# Patient Record
Sex: FEMALE | Race: WHITE | NOT HISPANIC OR LATINO | URBAN - METROPOLITAN AREA
[De-identification: names, ages, dates, MRNs, and addresses within clinical notes are randomized per-mention and may not be internally consistent; named-entity substitution may affect disease eponyms.]

---

## 2019-02-08 ENCOUNTER — EMERGENCY (EMERGENCY)
Facility: HOSPITAL | Age: 85
LOS: 1 days | End: 2019-02-08
Attending: EMERGENCY MEDICINE
Payer: MEDICARE

## 2019-02-08 VITALS
TEMPERATURE: 98 F | HEIGHT: 66 IN | RESPIRATION RATE: 18 BRPM | SYSTOLIC BLOOD PRESSURE: 159 MMHG | DIASTOLIC BLOOD PRESSURE: 75 MMHG | WEIGHT: 136.03 LBS | HEART RATE: 65 BPM | OXYGEN SATURATION: 94 %

## 2019-02-08 DIAGNOSIS — S31.41XA LACERATION WITHOUT FOREIGN BODY OF VAGINA AND VULVA, INITIAL ENCOUNTER: ICD-10-CM

## 2019-02-08 LAB
ANION GAP SERPL CALC-SCNC: 15 MMOL/L — SIGNIFICANT CHANGE UP (ref 5–17)
APTT BLD: 31.8 SEC — SIGNIFICANT CHANGE UP (ref 27.5–36.3)
BASOPHILS # BLD AUTO: 0 K/UL — SIGNIFICANT CHANGE UP (ref 0–0.2)
BASOPHILS NFR BLD AUTO: 0.2 % — SIGNIFICANT CHANGE UP (ref 0–2)
BLD GP AB SCN SERPL QL: NEGATIVE — SIGNIFICANT CHANGE UP
BUN SERPL-MCNC: 12 MG/DL — SIGNIFICANT CHANGE UP (ref 7–23)
CALCIUM SERPL-MCNC: 9.2 MG/DL — SIGNIFICANT CHANGE UP (ref 8.4–10.5)
CHLORIDE SERPL-SCNC: 103 MMOL/L — SIGNIFICANT CHANGE UP (ref 96–108)
CO2 SERPL-SCNC: 21 MMOL/L — LOW (ref 22–31)
CREAT SERPL-MCNC: 0.6 MG/DL — SIGNIFICANT CHANGE UP (ref 0.5–1.3)
EOSINOPHIL # BLD AUTO: 0.2 K/UL — SIGNIFICANT CHANGE UP (ref 0–0.5)
EOSINOPHIL NFR BLD AUTO: 1.8 % — SIGNIFICANT CHANGE UP (ref 0–6)
GLUCOSE SERPL-MCNC: 87 MG/DL — SIGNIFICANT CHANGE UP (ref 70–99)
HCT VFR BLD CALC: 35.5 % — SIGNIFICANT CHANGE UP (ref 34.5–45)
HCT VFR BLD CALC: 39 % — SIGNIFICANT CHANGE UP (ref 34.5–45)
HGB BLD-MCNC: 12.5 G/DL — SIGNIFICANT CHANGE UP (ref 11.5–15.5)
HGB BLD-MCNC: 13.7 G/DL — SIGNIFICANT CHANGE UP (ref 11.5–15.5)
INR BLD: 1.05 RATIO — SIGNIFICANT CHANGE UP (ref 0.88–1.16)
LYMPHOCYTES # BLD AUTO: 1.2 K/UL — SIGNIFICANT CHANGE UP (ref 1–3.3)
LYMPHOCYTES # BLD AUTO: 11.5 % — LOW (ref 13–44)
MCHC RBC-ENTMCNC: 33.7 PG — SIGNIFICANT CHANGE UP (ref 27–34)
MCHC RBC-ENTMCNC: 33.8 PG — SIGNIFICANT CHANGE UP (ref 27–34)
MCHC RBC-ENTMCNC: 35.3 GM/DL — SIGNIFICANT CHANGE UP (ref 32–36)
MCHC RBC-ENTMCNC: 35.3 GM/DL — SIGNIFICANT CHANGE UP (ref 32–36)
MCV RBC AUTO: 95.5 FL — SIGNIFICANT CHANGE UP (ref 80–100)
MCV RBC AUTO: 95.8 FL — SIGNIFICANT CHANGE UP (ref 80–100)
MONOCYTES # BLD AUTO: 0.8 K/UL — SIGNIFICANT CHANGE UP (ref 0–0.9)
MONOCYTES NFR BLD AUTO: 7.7 % — SIGNIFICANT CHANGE UP (ref 2–14)
NEUTROPHILS # BLD AUTO: 8.3 K/UL — HIGH (ref 1.8–7.4)
NEUTROPHILS NFR BLD AUTO: 78.8 % — HIGH (ref 43–77)
PLATELET # BLD AUTO: 234 K/UL — SIGNIFICANT CHANGE UP (ref 150–400)
PLATELET # BLD AUTO: 272 K/UL — SIGNIFICANT CHANGE UP (ref 150–400)
POTASSIUM SERPL-MCNC: 4.2 MMOL/L — SIGNIFICANT CHANGE UP (ref 3.5–5.3)
POTASSIUM SERPL-SCNC: 4.2 MMOL/L — SIGNIFICANT CHANGE UP (ref 3.5–5.3)
PROTHROM AB SERPL-ACNC: 12.1 SEC — SIGNIFICANT CHANGE UP (ref 10–12.9)
RBC # BLD: 3.71 M/UL — LOW (ref 3.8–5.2)
RBC # BLD: 4.08 M/UL — SIGNIFICANT CHANGE UP (ref 3.8–5.2)
RBC # FLD: 12 % — SIGNIFICANT CHANGE UP (ref 10.3–14.5)
RBC # FLD: 12.1 % — SIGNIFICANT CHANGE UP (ref 10.3–14.5)
RH IG SCN BLD-IMP: POSITIVE — SIGNIFICANT CHANGE UP
RH IG SCN BLD-IMP: POSITIVE — SIGNIFICANT CHANGE UP
SODIUM SERPL-SCNC: 139 MMOL/L — SIGNIFICANT CHANGE UP (ref 135–145)
WBC # BLD: 10.5 K/UL — SIGNIFICANT CHANGE UP (ref 3.8–10.5)
WBC # BLD: 9.5 K/UL — SIGNIFICANT CHANGE UP (ref 3.8–10.5)
WBC # FLD AUTO: 10.5 K/UL — SIGNIFICANT CHANGE UP (ref 3.8–10.5)
WBC # FLD AUTO: 9.5 K/UL — SIGNIFICANT CHANGE UP (ref 3.8–10.5)

## 2019-02-08 PROCEDURE — 99218: CPT

## 2019-02-08 PROCEDURE — 93010 ELECTROCARDIOGRAM REPORT: CPT

## 2019-02-08 RX ORDER — SODIUM CHLORIDE 9 MG/ML
1000 INJECTION INTRAMUSCULAR; INTRAVENOUS; SUBCUTANEOUS
Qty: 0 | Refills: 0 | Status: DISCONTINUED | OUTPATIENT
Start: 2019-02-08 | End: 2019-02-12

## 2019-02-08 RX ORDER — ACETAMINOPHEN 500 MG
1000 TABLET ORAL ONCE
Qty: 0 | Refills: 0 | Status: COMPLETED | OUTPATIENT
Start: 2019-02-08 | End: 2019-02-08

## 2019-02-08 RX ADMIN — SODIUM CHLORIDE 125 MILLILITER(S): 9 INJECTION INTRAMUSCULAR; INTRAVENOUS; SUBCUTANEOUS at 18:37

## 2019-02-08 RX ADMIN — Medication 400 MILLIGRAM(S): at 18:37

## 2019-02-08 RX ADMIN — Medication 1000 MILLIGRAM(S): at 18:38

## 2019-02-08 NOTE — ED ADULT NURSE NOTE - OBJECTIVE STATEMENT
87y f pt was brought in by ob/gyn for vag bleed s/p transvaginal ultrasound; pt states felt a small amt of blood then stood up and felt a "woosh"; pt has gone through 4 pads in last hour; md packed and brought her to ed; pt denies pain but states reason for exam is pt complaint of "burning feeling" to genital area for months and than burning started radiating; aox3; no resp distress; no chest pain; no abd pain; no n/v/d; no fever/chills; packing not removed will wait for ob to unpack and assess; iv placed; labs drawn per md order; safety/comfort maintained

## 2019-02-08 NOTE — ED CLERICAL - NS ED CLERK NOTE PRE-ARRIVAL INFORMATION; ADDITIONAL PRE-ARRIVAL INFORMATION
CC/Reason For referral: Vaginal bleed, needs packing  Preferred Consultant(if applicable): gyn resident  Who admits for you (if needed):na  Do you have documents you would like to fax over?no  Would you still like to speak to an ED attending? yes

## 2019-02-08 NOTE — ED PROVIDER NOTE - OBJECTIVE STATEMENT
87yof pmhx of Afib on Asa, hx of RA sent in by GYN for profuse vaginal bleeding. pt reports getting outpt w/u for possible vaginal infection and today had transvaginal US and after the exam had small amount of blood but when stood up started bleeding heavy and soaked through 4 pads within hour. pt was then packed by Dr. Alvarez in office and sent to the ER. Pt denies any dizziness, syncope, headache, palpitations. No abd pain.

## 2019-02-08 NOTE — ED ADULT NURSE NOTE - NSIMPLEMENTINTERV_GEN_ALL_ED
Implemented All Fall with Harm Risk Interventions:  [place] to call system. Call bell, personal items and telephone within reach. Instruct patient to call for assistance. Room bathroom lighting operational. Non-slip footwear when patient is off stretcher. Physically safe environment: no spills, clutter or unnecessary equipment. Stretcher in lowest position, wheels locked, appropriate side rails in place. Provide visual cue, wrist band, yellow gown, etc. Monitor gait and stability. Monitor for mental status changes and reorient to person, place, and time. Review medications for side effects contributing to fall risk. Reinforce activity limits and safety measures with patient and family. Provide visual clues: red socks.

## 2019-02-08 NOTE — CONSULT NOTE ADULT - ASSESSMENT
88 yo with vaginal laceration 2/2 transvaginal ultrasound.  Patient continues to have active bleeding but is a poor candidate for surgical repair due to vaginal atrophy and stenosis.  Patient is on daily baby aspirin which may be contributing to bleeding.  She remains hemodynamically stable, with normal H/H and without signs or symptoms of anemia.  She is experiencing minimal pain at this time.

## 2019-02-08 NOTE — ED PROVIDER NOTE - ATTENDING CONTRIBUTION TO CARE
86 yo F presents with vaginal bleeding since ~12pm after transvaginal ultrasound done as outpatient by obgyn Dr. velázquez. patient had US done for burning pelvic sensation. during the procedure patient started having heavy vaginal bleeding. has gone through several pads already in a few hours. sent in by obgyn for further management. patient has not other complaints at this time.   patient on baby aspirin, took last dose this morning  no f/ch, uri symptoms, cough, cp, sob, N/V/D, urinary complaints, abd pain  PE lungs CTA. abd soft and NT. pelvic exam deffered to obgyn team 88 yo F presents with vaginal bleeding since ~12pm after transvaginal ultrasound done as outpatient by obgyn Dr. velázquez. patient had US done for burning pelvic sensation. during the procedure patient started having heavy vaginal bleeding. has gone through several pads already in a few hours. sent in by obgyn for further management. patient has not other complaints at this time.   patient on baby aspirin, took last dose this morning  no f/ch, uri symptoms, cough, cp, sob, N/V/D, urinary complaints, abd pain  PE lungs CTA. abd soft and NT. pelvic exam deffered to obgyn team  obgyn was consulted. they came to eval  patient. they packed vagina. patietn was observed in the ER for several hours. repeat cbc obtained, HB decreased by one unit. bleeding signifiantly slowed down.   obgyn recommended further obs. patient was transferred to CDU for continued observation, VS montiring, serial H/H.

## 2019-02-08 NOTE — CONSULT NOTE ADULT - SUBJECTIVE AND OBJECTIVE BOX
R3 GYN Consult note    86yo G0 postmenopausal woman presents with vaginal bleeding after a transvaginal ultrasound performed in GYN office today.  Patient presented this morning complaining of labial burning, which had occurred once before.  Dr Gunderson performed a transvaginal ultrasound as part of the work-up and patient began experiencing bright red bleeding.  A laceration could not be identified so the vaginal was packed with two gauzes and patient was transferred to ER.  Patient continues to soak menstrual pads through the packing.  She denies lightheadedness, chest pain, nausea, and vomiting.    OB/GYN HISTORY: G0, history of fibroids  PAST MEDICAL & SURGICAL HISTORY: A-fib on baby aspirin, Rheumatoid arthritis, s/p thoracoscopy (benign lung nodules - MAC)    Allergies Cardizem (Rash)    MEDICATIONS  (STANDING): Vera[cinthya 120 mg BID, Atenolol 25 mg, Xeljanz XR 11 mg, Omeprazole, simvastatin 10 mg, ASA81, flovent inhaler, zyrtec, biotin 300 mcg, Caltrat D3, MVI, Align Probiotic, Omega, zantac, Folic acid 4 mg    SOCIAL HISTORY: former smoker, denies alcohol and illicit drug use     Vital Signs Last 24 Hrs  T(C): 36.6 (08 Feb 2019 14:54), Max: 36.6 (08 Feb 2019 14:54)  T(F): 97.8 (08 Feb 2019 14:54), Max: 97.8 (08 Feb 2019 14:54)  HR: 65 (08 Feb 2019 14:54) (65 - 65)  BP: 159/75 (08 Feb 2019 14:54) (159/75 - 159/75)  BP(mean): --  RR: 18 (08 Feb 2019 14:54) (18 - 18)  SpO2: 94% (08 Feb 2019 14:54) (94% - 94%)    PHYSICAL EXAM:      Constitutional: alert and oriented x 3    Breasts: no tenderness, no nodules    Respiratory: clear    Cardiovascular: regular rate and rhythm    Gastrointestinal: soft, non tender, + bowel sounds. No hepatosplenomegaly, no palpable masses    Genitourinary: NEFG  Cervix: closed/ long, no CMT  Uterus: normal size, non tender  Adnexa: non tender, no palpable masses    Rectal:     Extremities:    Neurological:    Skin:    Lymph Nodes:        LABS:                        13.7   10.5  )-----------( 272      ( 08 Feb 2019 17:01 )             39.0                     RADIOLOGY & ADDITIONAL STUDIES: R3 GYN Consult note    88yo G0 postmenopausal woman presents with vaginal bleeding after a transvaginal ultrasound performed in GYN office today.  Patient presented this morning complaining of labial burning, which had occurred once before.  Dr Gunderson performed a transvaginal ultrasound as part of the work-up and patient began experiencing bright red bleeding.  A laceration could not be identified so the vaginal was packed with two gauzes and patient was transferred to ER.  Patient continues to soak menstrual pads through the packing.  She denies lightheadedness, chest pain, nausea, and vomiting.    OB/GYN HISTORY: G0, history of fibroids  PAST MEDICAL & SURGICAL HISTORY: A-fib on baby aspirin, Rheumatoid arthritis, s/p thoracoscopy (benign lung nodules - MAC)    Allergies Cardizem (Rash)    MEDICATIONS  (STANDING): Vera[cinthya 120 mg BID, Atenolol 25 mg, Xeljanz XR 11 mg, Omeprazole, simvastatin 10 mg, ASA81, flovent inhaler, zyrtec, biotin 300 mcg, Caltrat D3, MVI, Align Probiotic, Omega, zantac, Folic acid 4 mg    SOCIAL HISTORY: former smoker, denies alcohol and illicit drug use     Vital Signs Last 24 Hrs  T(C): 36.6 (08 Feb 2019 14:54), Max: 36.6 (08 Feb 2019 14:54)  T(F): 97.8 (08 Feb 2019 14:54), Max: 97.8 (08 Feb 2019 14:54)  HR: 65 (08 Feb 2019 14:54) (65 - 65)  BP: 159/75 (08 Feb 2019 14:54) (159/75 - 159/75)  BP(mean): --  RR: 18 (08 Feb 2019 14:54) (18 - 18)  SpO2: 94% (08 Feb 2019 14:54) (94% - 94%)    PHYSICAL EXAM:      Constitutional: alert and oriented x 3    Respiratory: clear    Cardiovascular: regular rate and rhythm    Gastrointestinal: soft, non tender, + bowel sounds. No hepatosplenomegaly, no palpable masses    Genitourinary: bilateral vulvar erythema without discrete lesions  Vaginal packing removed, noted to be soaked.  Bright red bleeding noted.  Extreme vaginal atrophy and stenosis with tight ring of tissue posterior to hymenal ring.  Vaginal packed with krilex soaked in Monsel's solution        LABS:                        13.7   10.5  )-----------( 272      ( 08 Feb 2019 17:01 )             39.0

## 2019-02-08 NOTE — CHART NOTE - NSCHARTNOTEFT_GEN_A_CORE
R4  Patient seen and evaluated. Denies CP/SOB/lightheadedness.   ICU Vital Signs Last 24 Hrs  T(C): 36.4 (08 Feb 2019 20:32), Max: 36.6 (08 Feb 2019 14:54)  T(F): 97.6 (08 Feb 2019 20:32), Max: 97.8 (08 Feb 2019 14:54)  HR: 66 (08 Feb 2019 20:32) (65 - 66)  BP: 131/65 (08 Feb 2019 20:32) (131/65 - 159/75)  BP(mean): --  ABP: --  ABP(mean): --  RR: 16 (08 Feb 2019 20:32) (16 - 18)  SpO2: 94% (08 Feb 2019 20:32) (94% - 95%)  ve: no active bleeding, scant VB (<50 cc) of dark red blood on pad    plan:  will admit to cdu overnight for monitoring to r/o further VB.  TSS R4  d/w dr balbuena, [place] with [place] thomas ([facility] [place] R4  Patient seen and evaluated. Denies CP/SOB/lightheadedness.   ICU Vital Signs Last 24 Hrs  T(C): 36.4 (08 Feb 2019 20:32), Max: 36.6 (08 Feb 2019 14:54)  T(F): 97.6 (08 Feb 2019 20:32), Max: 97.8 (08 Feb 2019 14:54)  HR: 66 (08 Feb 2019 20:32) (65 - 66)  BP: 131/65 (08 Feb 2019 20:32) (131/65 - 159/75)  BP(mean): --  ABP: --  ABP(mean): --  RR: 16 (08 Feb 2019 20:32) (16 - 18)  SpO2: 94% (08 Feb 2019 20:32) (94% - 95%)  ve: no active bleeding, scant VB (<50 cc) of dark red blood on pad    plan:  -will admit to cdu overnight for monitoring to r/o further VB.  -vital signs q2  -cbc at 5 am  TSS R4  d/w dr balbuena, seen with dr thomas ([facility] [place])

## 2019-02-08 NOTE — ED PROVIDER NOTE - PROGRESS NOTE DETAILS
spoke to GYN resident. made aware that packing was not removed and no fresh blood on the underwear. will come see the pt.

## 2019-02-08 NOTE — CONSULT NOTE ADULT - PROBLEM SELECTOR RECOMMENDATION 9
-Monitor bleeding with packing in place  -Monitor vital signs and for signs or symptoms of anemia  -Keep NPO on maintenance fluids in case surgical intervention is needed  -Analgesia as needed    dw Dr Gunderson, Patient seen and examined with Dr Johnson PGY4    MACK Garcia PGY3

## 2019-02-09 VITALS
TEMPERATURE: 98 F | OXYGEN SATURATION: 98 % | SYSTOLIC BLOOD PRESSURE: 147 MMHG | HEART RATE: 70 BPM | RESPIRATION RATE: 18 BRPM | DIASTOLIC BLOOD PRESSURE: 69 MMHG

## 2019-02-09 DIAGNOSIS — N93.9 ABNORMAL UTERINE AND VAGINAL BLEEDING, UNSPECIFIED: ICD-10-CM

## 2019-02-09 PROBLEM — Z00.00 ENCOUNTER FOR PREVENTIVE HEALTH EXAMINATION: Status: ACTIVE | Noted: 2019-02-09

## 2019-02-09 LAB
HCT VFR BLD CALC: 36.4 % — SIGNIFICANT CHANGE UP (ref 34.5–45)
HGB BLD-MCNC: 12.3 G/DL — SIGNIFICANT CHANGE UP (ref 11.5–15.5)
MCHC RBC-ENTMCNC: 32.8 PG — SIGNIFICANT CHANGE UP (ref 27–34)
MCHC RBC-ENTMCNC: 33.9 GM/DL — SIGNIFICANT CHANGE UP (ref 32–36)
MCV RBC AUTO: 96.7 FL — SIGNIFICANT CHANGE UP (ref 80–100)
PLATELET # BLD AUTO: 221 K/UL — SIGNIFICANT CHANGE UP (ref 150–400)
RBC # BLD: 3.76 M/UL — LOW (ref 3.8–5.2)
RBC # FLD: 12.5 % — SIGNIFICANT CHANGE UP (ref 10.3–14.5)
WBC # BLD: 6.8 K/UL — SIGNIFICANT CHANGE UP (ref 3.8–10.5)
WBC # FLD AUTO: 6.8 K/UL — SIGNIFICANT CHANGE UP (ref 3.8–10.5)

## 2019-02-09 PROCEDURE — 96374 THER/PROPH/DIAG INJ IV PUSH: CPT

## 2019-02-09 PROCEDURE — 99284 EMERGENCY DEPT VISIT MOD MDM: CPT | Mod: 25

## 2019-02-09 PROCEDURE — 80048 BASIC METABOLIC PNL TOTAL CA: CPT

## 2019-02-09 PROCEDURE — 86850 RBC ANTIBODY SCREEN: CPT

## 2019-02-09 PROCEDURE — 85730 THROMBOPLASTIN TIME PARTIAL: CPT

## 2019-02-09 PROCEDURE — 93005 ELECTROCARDIOGRAM TRACING: CPT

## 2019-02-09 PROCEDURE — 85027 COMPLETE CBC AUTOMATED: CPT

## 2019-02-09 PROCEDURE — G0378: CPT

## 2019-02-09 PROCEDURE — 86900 BLOOD TYPING SEROLOGIC ABO: CPT

## 2019-02-09 PROCEDURE — 86901 BLOOD TYPING SEROLOGIC RH(D): CPT

## 2019-02-09 PROCEDURE — 99217: CPT

## 2019-02-09 PROCEDURE — 85610 PROTHROMBIN TIME: CPT

## 2019-02-09 RX ORDER — RANITIDINE HYDROCHLORIDE 150 MG/1
1 TABLET, FILM COATED ORAL
Qty: 0 | Refills: 0 | COMMUNITY

## 2019-02-09 RX ORDER — SIMVASTATIN 20 MG/1
1 TABLET, FILM COATED ORAL
Qty: 0 | Refills: 0 | COMMUNITY

## 2019-02-09 RX ORDER — ATENOLOL 25 MG/1
1 TABLET ORAL
Qty: 0 | Refills: 0 | COMMUNITY

## 2019-02-09 RX ORDER — ASPIRIN/CALCIUM CARB/MAGNESIUM 324 MG
1 TABLET ORAL
Qty: 0 | Refills: 0 | COMMUNITY

## 2019-02-09 RX ORDER — VERAPAMIL HCL 240 MG
1 CAPSULE, EXTENDED RELEASE PELLETS 24 HR ORAL
Qty: 0 | Refills: 0 | COMMUNITY

## 2019-02-09 RX ORDER — FLUTICASONE PROPIONATE 220 MCG
2 AEROSOL WITH ADAPTER (GRAM) INHALATION
Qty: 0 | Refills: 0 | COMMUNITY

## 2019-02-09 RX ORDER — TOFACITINIB 11 MG/1
1 TABLET, FILM COATED, EXTENDED RELEASE ORAL
Qty: 0 | Refills: 0 | COMMUNITY

## 2019-02-09 RX ORDER — VERAPAMIL HCL 240 MG
120 CAPSULE, EXTENDED RELEASE PELLETS 24 HR ORAL ONCE
Qty: 0 | Refills: 0 | Status: COMPLETED | OUTPATIENT
Start: 2019-02-09 | End: 2019-02-09

## 2019-02-09 RX ORDER — FOLIC ACID 0.8 MG
1 TABLET ORAL
Qty: 0 | Refills: 0 | COMMUNITY

## 2019-02-09 RX ORDER — CEPHALEXIN 500 MG
1 CAPSULE ORAL
Qty: 9 | Refills: 0 | OUTPATIENT
Start: 2019-02-09 | End: 2019-02-11

## 2019-02-09 RX ADMIN — Medication 120 MILLIGRAM(S): at 01:34

## 2019-02-09 RX ADMIN — SODIUM CHLORIDE 125 MILLILITER(S): 9 INJECTION INTRAMUSCULAR; INTRAVENOUS; SUBCUTANEOUS at 00:06

## 2019-02-09 NOTE — CHART NOTE - NSCHARTNOTEFT_GEN_A_CORE
Pt with vaginal bleeding after vaginal sono today done by PMD.  Pt had vaginal packing placed with monsel's solution.    Pt re-assesed for bleeding    VSS, afebrile  Pelvic - packing in place , no acute bleeding noted.                          12.5   9.5   )-----------( 234      ( 08 Feb 2019 20:42 )             35.5     Hg 13.7 to 12.5    Will  1) Observe pt overnight  2) CBC at 5 am  3) watch vitals  4) Pt and her PMD understand plan and are in agreement    MORENA Santiago

## 2019-02-09 NOTE — ED CDU PROVIDER INITIAL DAY NOTE - OBJECTIVE STATEMENT
86y/o F with PMH of Afib (on ASA, verapamil, atenolol) and RA, sent in by GYN for profuse vaginal bleeding. pt reports getting outpt w/u for possible vaginal infection and had a transvaginal US today. after the exam pt a had small amount of blood, but when pt stood up she started bleeding heavily and soaked through 4 pads within hour. pt was then packed by Dr. Gunderson in the office and sent to the ER. Pt denies any lightheadedness, dizziness, weakness, syncope, headache, palpitations, CP, SOB, abd pain, back pain, problems walking.  In ED, H&H trending down but stable, other labs unremarkable, vaginal bleeding decreased. pt states she has only changed her pad once since being in the ED. GYN c/s'd, recommended CDU for pad counts, repeat CBC, and monitoring, will re-evaluate pt in AM.     PMD Dr. KARI Hoskins (not aff.)  GYN Dr. Gunderson

## 2019-02-09 NOTE — ED ADULT NURSE REASSESSMENT NOTE - COMFORT CARE
ambulated to bathroom/po fluids offered/repositioned/warm blanket provided/darkened lights/plan of care explained

## 2019-02-09 NOTE — PROGRESS NOTE ADULT - SUBJECTIVE AND OBJECTIVE BOX
INTERVAL HPI/OVERNIGHT EVENTS: Pt seen and examined at bedside.  Pt without complaints this morning.  Ambulating, passing flatus, tolerating regular diet, pain controlled with analgesia, urinating spontaneously  SHe denies nausea/vomiting/fever/chills/chest pain/SOB/dizziness.    MEDICATIONS  (STANDING):  sodium chloride 0.9%. 1000 milliLiter(s) (125 mL/Hr) IV Continuous <Continuous>    MEDICATIONS  (PRN):      12 point ROS negative except as outlined above    Vital Signs Last 24 Hrs  T(C): 36.4 (09 Feb 2019 04:34), Max: 36.6 (08 Feb 2019 14:54)  T(F): 97.5 (09 Feb 2019 04:34), Max: 97.8 (08 Feb 2019 14:54)  HR: 67 (09 Feb 2019 04:34) (65 - 69)  BP: 135/69 (09 Feb 2019 04:34) (131/65 - 159/75)  BP(mean): --  RR: 17 (09 Feb 2019 04:34) (16 - 18)  SpO2: 95% (09 Feb 2019 04:34) (94% - 95%)    I&O's Summary        PHYSICAL EXAM:    GA: NAD, A+0 x 3  CV: RRR  Pulm: CTA BL  Abd: ( + ) BS, soft, nontender, nondistended, no rebound or guarding,   : minimal bleeding on pad, vaginal packing in place  Extremities: no swelling or calf tenderness, reflexes +2 bilaterally    LABS:                          12.3   6.8   )-----------( 221      ( 09 Feb 2019 05:43 )             36.4   baso x      eos x      imm gran x      lymph x      mono x      poly x                            12.5   9.5   )-----------( 234      ( 08 Feb 2019 20:42 )             35.5   baso x      eos x      imm gran x      lymph x      mono x      poly x                            13.7   10.5  )-----------( 272      ( 08 Feb 2019 17:01 )             39.0   baso 0.2    eos 1.8    imm gran x      lymph 11.5   mono 7.7    poly 78.8         PT/INR - ( 08 Feb 2019 17:01 )   PT: 12.1 sec;   INR: 1.05 ratio         PTT - ( 08 Feb 2019 17:01 )  PTT:31.8 sec          RADIOLOGY & ADDITIONAL TESTS:

## 2019-02-09 NOTE — ED CDU PROVIDER SUBSEQUENT DAY NOTE - ATTENDING CONTRIBUTION TO CARE
I have seen and evaluated this patient with the advance practice clinician.   I agree with the findings  unless other wise stated. I have amended notes where needed.  After my face to face bedside evaluation, I am noting: I have seen and evaluated this patient with the advance practice clinician.   I agree with the findings  unless other wise stated. I have amended notes where needed.  After my face to face bedside evaluation, I am noting: Pt stable Hb monitoring satisfactory no dizziness no lightheaded ambulating No active vaginal bleeding abdomen soft d/c plan d/w pt will stat t antibiotics prophylactic for vaginal packing has appointment with Dr Alvarez for f/u and packing removal --Calabrese

## 2019-02-09 NOTE — ED CDU PROVIDER DISPOSITION NOTE - PLAN OF CARE
1. Continue your current home medications. Stay hydrated.  2. Follow up with your GYN Dr. Gunderson within 2-3 days.   3. Return to the emergency department if you experience increased vaginal bleeding, weakness, lightheadedness, chest pain, shortness of breath, or any other concerning symptoms.

## 2019-02-09 NOTE — ED CDU PROVIDER SUBSEQUENT DAY NOTE - MEDICAL DECISION MAKING DETAILS
Pt stable Hb monitoring satisfactory no dizziness no lightheaded ambulating No active vaginal bleeding abdomen soft d/c plan d/w pt will stat t antibiotics prophylactic for vaginal packing has appointment with Dr Alvarez for f/u and packing removal --Calabrese

## 2019-02-09 NOTE — ED CDU PROVIDER INITIAL DAY NOTE - DETAILS
-frequent eval/vitals  -pad counts  -trend CBCs  -NPO  -GYN following  -IVFs  -case discussed with Dr. Aguilar

## 2019-02-09 NOTE — PROGRESS NOTE ADULT - PROBLEM SELECTOR PLAN 1
-pt with stable H/H this morning  -Dr. Gunderson to d/c vaginal packing in the office Monday morning  -pt okay to be discharged    Lee Guerra MD PGY2

## 2019-02-09 NOTE — ED ADULT NURSE REASSESSMENT NOTE - NSIMPLEMENTINTERV_GEN_ALL_ED
Implemented All Fall with Harm Risk Interventions:  [place] to call system. Call bell, personal items and telephone within reach. Instruct patient to call for assistance. Room bathroom lighting operational. Non-slip footwear when patient is off stretcher. Physically safe environment: no spills, clutter or unnecessary equipment. Stretcher in lowest position, wheels locked, appropriate side rails in place. Provide visual cue, wrist band, yellow gown, etc. Monitor gait and stability. Monitor for mental status changes and reorient to person, place, and time. Review medications for side effects contributing to fall risk. Reinforce activity limits and safety measures with patient and family. Provide visual clues: red socks.

## 2019-02-09 NOTE — ED CDU PROVIDER SUBSEQUENT DAY NOTE - PROGRESS NOTE DETAILS
CDU NOTE BETI Jesus: VSS NAD. Patient is resting comfortably and is without any complaints. Pad from overnight examined with moderate blood. Patient seen by GYN resident this am who discussed w/ attending who states pt to keep vaginal packing in until Monday and should follow up in office at that time for removal. Patient cleared to eat Case discussed w/ Dr. Calabrese, will send w/ 3 days keflex secondary to packing being kept in place. Patient advised to follow up monday w/ GYN  Sandee Jesus PA-C

## 2019-02-09 NOTE — ED CDU PROVIDER DISPOSITION NOTE - ATTENDING CONTRIBUTION TO CARE
I have seen and evaluated this patient with the [place] practice clinician.   I agree with the findings  unless other wise stated. I have amended notes where needed.  After my face to face bedside evaluation, I am noting: Pt stable Hb monitoring satisfactory no dizziness no lightheaded ambulating No active vaginal bleeding abdomen soft d/c plan d/w pt will stat t antibiotics prophylactic for vaginal packing has appointment with Dr Alvarez for f/u and packing removal --Calabrese

## 2019-02-09 NOTE — ED CDU PROVIDER DISPOSITION NOTE - NSFOLLOWUPINSTRUCTIONS_ED_ALL_ED_FT
1. Continue your current home medications. Stay hydrated.  2. Follow up with your GYN Dr. Wright Monday to remove packing   3. Continue all medication and ADDITIONALLY TAKE ANTIBIOTICS KEFLEX  TAB EVERY HOURS UNTIL PACKING IS REMOVED   3. Return to the emergency department if you experience increased vaginal bleeding, weakness, lightheadedness, chest pain, shortness of breath, or any other concerning symptoms.

## 2019-02-09 NOTE — ED CDU PROVIDER SUBSEQUENT DAY NOTE - HISTORY
No interval changes since initial CDU provider note. Pt feels well without complaint. NAD, VSS. changed pad once since being in CDU. GYN following. pt to get repeat CBC at 5am. will continue monitoring. - BETI Sharma

## 2019-02-09 NOTE — ED CDU PROVIDER INITIAL DAY NOTE - ATTENDING CONTRIBUTION TO CARE
86 yo F presents with vaginal bleeding since ~12pm after transvaginal ultrasound done as outpatient by obgyn Dr. velázquez. patient had US done for burning pelvic sensation. during the procedure patient started having heavy vaginal bleeding. has gone through several pads already in a few hours. sent in by obgyn for further management. patient has not other complaints at this time.   patient on baby aspirin, took last dose this morning  no f/ch, uri symptoms, cough, cp, sob, N/V/D, urinary complaints, abd pain  PE lungs CTA. abd soft and NT. pelvic exam deffered to obgyn team  obgyn was consulted. they came to eval  patient. they packed vagina. patietn was observed in the ER for several hours. repeat cbc obtained, HB decreased by one unit. bleeding signifiantly slowed down.   obgyn recommended further obs. patient was transferred to CDU for continued observation, VS montiring, serial H/H.

## 2019-02-09 NOTE — ED CDU PROVIDER DISPOSITION NOTE - CLINICAL COURSE
88y/o F with PMH of Afib (on ASA, verapamil, atenolol) and RA, sent in by GYN for profuse vaginal bleeding. pt reports getting outpt w/u for possible vaginal infection and had a transvaginal US today. after the exam pt a had small amount of blood, but when pt stood up she started bleeding heavily and soaked through 4 pads within hour. pt was then packed by Dr. Gunderson in the office and sent to the ER. Pt denies any lightheadedness, dizziness, weakness, syncope, headache, palpitations, CP, SOB, abd pain, back pain, problems walking.  In ED, H&H trending down but stable, other labs unremarkable, vaginal bleeding decreased. pt states she has only changed her pad once since being in the ED. GYN c/s'd, recommended CDU for pad counts, repeat CBC, and monitoring, will re-evaluate pt in AM.   In CDU, __________ 88y/o F with PMH of Afib (on ASA, verapamil, atenolol) and RA, sent in by GYN for profuse vaginal bleeding. pt reports getting outpt w/u for possible vaginal infection and had a transvaginal US today. after the exam pt a had small amount of blood, but when pt stood up she started bleeding heavily and soaked through 4 pads within hour. pt was then packed by Dr. Gunderson in the office and sent to the ER. Pt denies any lightheadedness, dizziness, weakness, syncope, headache, palpitations, CP, SOB, abd pain, back pain, problems walking.  In ED, H&H trending down but stable, other labs unremarkable, vaginal bleeding decreased. pt states she has only changed her pad once since being in the ED. GYN c/s'd, recommended CDU for pad counts, repeat CBC, and monitoring, will re-evaluate pt in AM.   In CDU patient still with some bleeding but much improved.  Patient seen by GYN resident this am who discussed w/ attending who states pt to keep vaginal packing in until Monday and should follow up in office at that time for removal. Patient cleared to eat. Case discussed w/ Dr. Calabrese, will send w/ 3 days keflex secondary to packing being kept in place. Patient advised to follow up monday w/ GYN

## 2019-02-09 NOTE — ED ADULT NURSE REASSESSMENT NOTE - NS ED NURSE REASSESS COMMENT FT1
0137: 2 sanitary pads noted at pt bedside. One pad moderately soiled with blood in center of pad soaked.  2nd pad scant amount noted.
0535: 1 pad noted to be saturated through center, not extending past middle of pad.
pt resting comfortable awaiting dispo
Received pt from KIANA Tatum , received pt alert and responsive, oriented x4, denies any respiratory distress, SOB, or difficulty breathing. Pt transferred to CDU for vaginal bleeding s/p outpatient transvaginal ultrasound + bright red blood small to medium amount per pt, states amount has lessened since earlier today. Pending CBC in AM and pt will monitor pad count.  IV in place, patent and free of signs of infiltration, IV fluids infusing as ordered. pt denies chest pain or palpitations, V/S stable, pt afebrile, pt denies pain at this time. Pt educated on unit and unit rules, instructed patient to notify RN of any needed assistance, Pt verbalizes understanding, Call bell placed within reach. Safety maintained. Will continue to monitor.
07.00 Am Received report from KIANA Reich  Pt is observed for Vaginal bleeding   08.00 Am Pt reassessed  pt is alert and responsive, oriented x4, denies any respiratory distress, SOB,  CP/N/V/D fever chills or difficulty breathing.  IV in place, patent and free of signs of infiltration, IV fluids infusing as ordered. pt denies chest pain or palpitations, V/S stable, pt afebrile, pt denies pain at this time. Pt educated on unit and unit rules, instructed patient to notify RN of any needed assistance, Pt verbalizes understanding, Call bell placed within reach. Safety maintained. Will continue to monitor. Has no saturated  sanitary  pads . very minimal spotting noted   10.00 Am Pt is reviewed by DR COULTER  with all the results pt is discharged ML out  BETI Herman explained the follow up care   & gave the discharge summary  pt verbalized the understanding on follow up care pt stable to go home  pt has steady gait stable vitals  A&OX4 at the time of discharge

## 2019-02-10 ENCOUNTER — EMERGENCY (EMERGENCY)
Facility: HOSPITAL | Age: 85
LOS: 1 days | Discharge: HOME | End: 2019-02-10
Attending: EMERGENCY MEDICINE
Payer: MEDICARE

## 2019-02-10 VITALS
DIASTOLIC BLOOD PRESSURE: 80 MMHG | RESPIRATION RATE: 18 BRPM | SYSTOLIC BLOOD PRESSURE: 129 MMHG | WEIGHT: 136.03 LBS | HEIGHT: 66 IN | OXYGEN SATURATION: 95 % | HEART RATE: 67 BPM | TEMPERATURE: 98 F

## 2019-02-10 VITALS
SYSTOLIC BLOOD PRESSURE: 120 MMHG | TEMPERATURE: 98 F | RESPIRATION RATE: 18 BRPM | OXYGEN SATURATION: 95 % | DIASTOLIC BLOOD PRESSURE: 73 MMHG | HEART RATE: 71 BPM

## 2019-02-10 DIAGNOSIS — N93.9 ABNORMAL UTERINE AND VAGINAL BLEEDING, UNSPECIFIED: ICD-10-CM

## 2019-02-10 PROBLEM — M06.9 RHEUMATOID ARTHRITIS, UNSPECIFIED: Chronic | Status: ACTIVE | Noted: 2019-02-08

## 2019-02-10 PROBLEM — I48.91 UNSPECIFIED ATRIAL FIBRILLATION: Chronic | Status: ACTIVE | Noted: 2019-02-08

## 2019-02-10 LAB
ALBUMIN SERPL ELPH-MCNC: 4.3 G/DL — SIGNIFICANT CHANGE UP (ref 3.3–5)
ALP SERPL-CCNC: 84 U/L — SIGNIFICANT CHANGE UP (ref 40–120)
ALT FLD-CCNC: 12 U/L — SIGNIFICANT CHANGE UP (ref 10–45)
ANION GAP SERPL CALC-SCNC: 14 MMOL/L — SIGNIFICANT CHANGE UP (ref 5–17)
APTT BLD: 31.7 SEC — SIGNIFICANT CHANGE UP (ref 27.5–36.3)
AST SERPL-CCNC: 31 U/L — SIGNIFICANT CHANGE UP (ref 10–40)
BASOPHILS # BLD AUTO: 0 K/UL — SIGNIFICANT CHANGE UP (ref 0–0.2)
BASOPHILS NFR BLD AUTO: 0 % — SIGNIFICANT CHANGE UP (ref 0–2)
BILIRUB SERPL-MCNC: 1.1 MG/DL — SIGNIFICANT CHANGE UP (ref 0.2–1.2)
BLD GP AB SCN SERPL QL: NEGATIVE — SIGNIFICANT CHANGE UP
BUN SERPL-MCNC: 12 MG/DL — SIGNIFICANT CHANGE UP (ref 7–23)
CALCIUM SERPL-MCNC: 9.2 MG/DL — SIGNIFICANT CHANGE UP (ref 8.4–10.5)
CHLORIDE SERPL-SCNC: 103 MMOL/L — SIGNIFICANT CHANGE UP (ref 96–108)
CO2 SERPL-SCNC: 21 MMOL/L — LOW (ref 22–31)
CREAT SERPL-MCNC: 0.65 MG/DL — SIGNIFICANT CHANGE UP (ref 0.5–1.3)
EOSINOPHIL # BLD AUTO: 0.2 K/UL — SIGNIFICANT CHANGE UP (ref 0–0.5)
EOSINOPHIL NFR BLD AUTO: 2.9 % — SIGNIFICANT CHANGE UP (ref 0–6)
GLUCOSE SERPL-MCNC: 110 MG/DL — HIGH (ref 70–99)
HCT VFR BLD CALC: 40.8 % — SIGNIFICANT CHANGE UP (ref 34.5–45)
HGB BLD-MCNC: 13.5 G/DL — SIGNIFICANT CHANGE UP (ref 11.5–15.5)
INR BLD: 1.05 RATIO — SIGNIFICANT CHANGE UP (ref 0.88–1.16)
LYMPHOCYTES # BLD AUTO: 1 K/UL — SIGNIFICANT CHANGE UP (ref 1–3.3)
LYMPHOCYTES # BLD AUTO: 12.4 % — LOW (ref 13–44)
MAGNESIUM SERPL-MCNC: 2 MG/DL — SIGNIFICANT CHANGE UP (ref 1.6–2.6)
MCHC RBC-ENTMCNC: 32.1 PG — SIGNIFICANT CHANGE UP (ref 27–34)
MCHC RBC-ENTMCNC: 33.2 GM/DL — SIGNIFICANT CHANGE UP (ref 32–36)
MCV RBC AUTO: 96.6 FL — SIGNIFICANT CHANGE UP (ref 80–100)
MONOCYTES # BLD AUTO: 0.7 K/UL — SIGNIFICANT CHANGE UP (ref 0–0.9)
MONOCYTES NFR BLD AUTO: 9 % — SIGNIFICANT CHANGE UP (ref 2–14)
NEUTROPHILS # BLD AUTO: 5.9 K/UL — SIGNIFICANT CHANGE UP (ref 1.8–7.4)
NEUTROPHILS NFR BLD AUTO: 75.7 % — SIGNIFICANT CHANGE UP (ref 43–77)
PHOSPHATE SERPL-MCNC: 3.3 MG/DL — SIGNIFICANT CHANGE UP (ref 2.5–4.5)
PLATELET # BLD AUTO: 288 K/UL — SIGNIFICANT CHANGE UP (ref 150–400)
POTASSIUM SERPL-MCNC: 4.4 MMOL/L — SIGNIFICANT CHANGE UP (ref 3.5–5.3)
POTASSIUM SERPL-SCNC: 4.4 MMOL/L — SIGNIFICANT CHANGE UP (ref 3.5–5.3)
PROT SERPL-MCNC: 7.9 G/DL — SIGNIFICANT CHANGE UP (ref 6–8.3)
PROTHROM AB SERPL-ACNC: 12.1 SEC — SIGNIFICANT CHANGE UP (ref 10–12.9)
RBC # BLD: 4.22 M/UL — SIGNIFICANT CHANGE UP (ref 3.8–5.2)
RBC # FLD: 12.6 % — SIGNIFICANT CHANGE UP (ref 10.3–14.5)
RH IG SCN BLD-IMP: POSITIVE — SIGNIFICANT CHANGE UP
SODIUM SERPL-SCNC: 138 MMOL/L — SIGNIFICANT CHANGE UP (ref 135–145)
WBC # BLD: 7.8 K/UL — SIGNIFICANT CHANGE UP (ref 3.8–10.5)
WBC # FLD AUTO: 7.8 K/UL — SIGNIFICANT CHANGE UP (ref 3.8–10.5)

## 2019-02-10 PROCEDURE — 85610 PROTHROMBIN TIME: CPT

## 2019-02-10 PROCEDURE — 85730 THROMBOPLASTIN TIME PARTIAL: CPT

## 2019-02-10 PROCEDURE — 80053 COMPREHEN METABOLIC PANEL: CPT

## 2019-02-10 PROCEDURE — 83735 ASSAY OF MAGNESIUM: CPT

## 2019-02-10 PROCEDURE — 99284 EMERGENCY DEPT VISIT MOD MDM: CPT | Mod: GC

## 2019-02-10 PROCEDURE — 86850 RBC ANTIBODY SCREEN: CPT

## 2019-02-10 PROCEDURE — 86901 BLOOD TYPING SEROLOGIC RH(D): CPT

## 2019-02-10 PROCEDURE — 85027 COMPLETE CBC AUTOMATED: CPT

## 2019-02-10 PROCEDURE — 99224: CPT

## 2019-02-10 PROCEDURE — 86900 BLOOD TYPING SEROLOGIC ABO: CPT

## 2019-02-10 PROCEDURE — 84100 ASSAY OF PHOSPHORUS: CPT

## 2019-02-10 PROCEDURE — 99283 EMERGENCY DEPT VISIT LOW MDM: CPT

## 2019-02-10 NOTE — ED ADULT NURSE NOTE - OBJECTIVE STATEMENT
87 year old female comes to the ED c/o vaginal bleeding s/p pelvic sonogram Friday. Patient reports pelvis was packed and she feels "something hanging from her vagina." Patient reports seeing blood and blood clots in the toilet on Saturday. Patient currently taking aspirin 81mg daily. Patient reports not taking the aspirin since the bleeding began on friday. Patient has a PMH of Afib, rheumatoid arthritis and pulmonary MAC infection. Upon assessment, patient is a/ox4, VSS and in NAD. Wheezing/crackles can be heard throughout the lung bases b/l; no labored breathing is noted. Patient denies feeling SOB. Patient's abdomen is soft, NT/ND. Patient's peripheral pulses are strong and equal to all extremities; no edema is present. Patient's skin is warm, dry, intact and normal for race. Patient denies pain, fever/chills, n/v/d, chest pain/SOB, dizziness, numbness/tingling/weakness, urinary symptoms. Patient has a 20G IV placed in the left AC. Patient awaiting MD assessment and plan of care to be discussed. Will continue to monitor

## 2019-02-10 NOTE — ED ADULT NURSE REASSESSMENT NOTE - NS ED NURSE REASSESS COMMENT FT1
MD Teague made aware of lab results including H/H. Care of plan discussed. Patient awaiting for OB/GYN consult. Will continue to monitor.

## 2019-02-10 NOTE — ED PROVIDER NOTE - OBJECTIVE STATEMENT
A 87 year old female with past history of Afib (on ASA) and RA who presents with recurrent vaginal bleeding s/p laceration from TVUS on Friday. The patient was discharged from CDU yesterday, had increased vaginal bleeding with visible clots with 3 pads changed yesterday. The pt presents with recurrent bleeding this AM, noticed upon urination. She denies any fevers/chills/dizziness/SOB/palpitations/chest pain/dysuria/abdominal pain. She did not take her ASA this AM. Packing from yesterday is reportedly still in place. A 87 year old female with past history of Afib (on ASA) and RA who presents with recurrent vaginal bleeding s/p laceration from TVUS on Friday. The patient was discharged from CDU yesterday, had increased vaginal bleeding with visible clots with 3 pads changed yesterday. The pt presents with recurrent bleeding this AM, noticed upon urination. She denies any fevers/chills/dizziness/SOB/palpitations/chest pain/dysuria/abdominal pain. She did not take her ASA this AM. Is on Day 2 out of 3 of Keflex.

## 2019-02-10 NOTE — CONSULT NOTE ADULT - PROBLEM SELECTOR RECOMMENDATION 9
-patient without active bleeding  -H/H improved from yesterday  -will remove vaginal packing in ED    Pt discussed with Dr. Mary Guerra MD PGY2

## 2019-02-10 NOTE — CONSULT NOTE ADULT - ASSESSMENT
86yo G0 postmenopausal woman presents with additional episode of vaginal bleeding after a transvaginal ultrasound performed in GYN office Friday, and vaginal packing placed Friday evening.

## 2019-02-10 NOTE — CONSULT NOTE ADULT - ATTENDING COMMENTS
Patient seen at bedside. Agree with PGY 2 note  Packing removed in the ED and patient monitored. Slight VB noted on pad and in vagina, however exam limited due to introitus being atrophic.  No profuse bleeding noted, no clots noted. unable to palpate any large lacerations    New packing with monsels placed into the vagina  Patient has an appointment to meet with OBGYN tomorrow at 3pm    Counseled to come to the ED if profuse bleeding, fevers/chills, unable to void  Mary DE LA CRUZ

## 2019-02-10 NOTE — CONSULT NOTE ADULT - SUBJECTIVE AND OBJECTIVE BOX
86yo G0 postmenopausal woman presents with additional episode of vaginal bleeding after a transvaginal ultrasound performed in GYN office Friday, and vaginal packing placed Friday evening.  Patient presented on Friday morning complaining of labial burning, which had occurred once before.  Dr. Gunderson performed a transvaginal ultrasound as part of the work-up and patient began experiencing bright red bleeding.  A laceration could not be identified so the vagina was packed with two gauzes and patient was transferred to ER.  After arriving to the ER, the gauze was removed and a specific laceration was unable to be appreciated. Due to extreme vaginal atrophy, visualization past the introitus was not possible. The vagina was then packed with 1 vaginal packing soaked in monsels solution. Her H/H was stable, overnight, and she was discharged without active vaginal bleeding. Since being discharged, she notes that she has changed her pad 4 times. She notes that it is less than a period and was dark red-brown. When she urinated this morning, she notes that she thought there was blood in the toilet. She denies lightheadedness, chest pain, nausea, and vomiting.      OB/GYN HISTORY: G0, history of fibroids  PAST MEDICAL & SURGICAL HISTORY:  Rheumatoid arthritis  Atrial fibrillation  No significant past surgical history    Allergies    Cardizem (Rash)    Intolerances      MEDICATIONS  (STANDING):    MEDICATIONS  (PRN):    FAMILY HISTORY:  No pertinent family history in first degree relatives    SOCIAL HISTORY: denies tobacco, alcohol and illicit drugs    Name of GYN Physician: Dr. Gunderson       Vital Signs Last 24 Hrs  T(C): 36.8 (10 Feb 2019 08:20), Max: 36.8 (10 Feb 2019 08:20)  T(F): 98.2 (10 Feb 2019 08:20), Max: 98.2 (10 Feb 2019 08:20)  HR: 69 (10 Feb 2019 08:20) (67 - 69)  BP: 118/57 (10 Feb 2019 08:20) (118/57 - 129/80)  BP(mean): --  RR: 18 (10 Feb 2019 08:20) (18 - 18)  SpO2: 96% (10 Feb 2019 08:20) (95% - 96%)    PHYSICAL EXAM:      Constitutional: alert and oriented x 3    Respiratory: clear    Cardiovascular: regular rate and rhythm    Gastrointestinal: soft, non tender, + bowel sounds. No hepatosplenomegaly, no palpable masses    : vaginal packing soaked in monsels in place, partial protruding from the introitus    Extremities: no swelling or tenderness in bl LE      LABS:                        13.5   7.8   )-----------( 288      ( 10 Feb 2019 08:24 )             40.8     02-08    139  |  103  |  12  ----------------------------<  87  4.2   |  21<L>  |  0.60    Ca    9.2      08 Feb 2019 17:01      PT/INR - ( 10 Feb 2019 08:24 )   PT: 12.1 sec;   INR: 1.05 ratio         PTT - ( 10 Feb 2019 08:24 )  PTT:31.7 sec      Blood Type: O Positive      RADIOLOGY & ADDITIONAL STUDIES:

## 2019-02-10 NOTE — ED PROVIDER NOTE - NSFOLLOWUPINSTRUCTIONS_ED_ALL_ED_FT
Please continue to take Keflex. Please follow up with your OB GYN tomorrow. Please return to the ED if you have persistent vaginal bleeding/dizziness/shortness of breath.

## 2019-02-10 NOTE — ED PROVIDER NOTE - PROGRESS NOTE DETAILS
Pt seen by GYN, packing removed no active bleeding observed. Pt to be observed over next hour for bleeding. Repacked by GYN. Pt to go home and FU with GYN tomorrow,  continue taking Keflex

## 2019-02-10 NOTE — ED PROVIDER NOTE - CARE PLAN
Principal Discharge DX:	Vaginal bleeding  Assessment and plan of treatment:	Repacked by GYN, will FU with OB GYN tomorrow.

## 2019-02-10 NOTE — ED PROVIDER NOTE - MEDICAL DECISION MAKING DETAILS
87 y old f with vaginal laceration secondary to US ,send by GYN to repeate  blood work  and on reevaluation: of the bleeding ,pt feels better ,no active bleeding .will discussed with ob ,repeated CBC and out patient follow up ZR

## 2019-11-29 NOTE — ED PROVIDER NOTE - NEUROLOGICAL, MLM
Per Dr. Mohan' nurse, patient has appt scheduled today for discussion of IUD.   Alert and oriented, no focal deficits, no motor or sensory deficits.

## 2020-01-09 NOTE — ED ADULT NURSE NOTE - ED STAT RN HANDOFF WHERE
- D/w Onc team. No further plans for ongoing chemo given overall status.  - Will consider for immunotherapy once patient is improved physically/nutritionally after completion of rehab. no plans for treatment while in rehab midway to u

## 2021-12-01 NOTE — ED ADULT TRIAGE NOTE - MODE OF ARRIVAL
[facility] Consult Note    # Demographics  Consult Type: Acute Stroke Level 1 (0-4.5 hrs)    Patient Location: Emergency Room    First Name: Maryana    Last Name: Hari    YOB: 1937    Age: 83    Gender: Female    Facility: [facility]    Time of Initial Page (Central Time): 11/30/2021, 18:15    Time of Return Call (Central Time): 11/30/2021, 18:15      # HPI  History: Presenting to the ED with difficulty swallowing.  Symptoms started after lunch today at approximately 2pm.      # Scores  Time of exam and NIHSS (Central Time): 11/30/2021, 18:19    Level of Consciousness 1a: [0] = Alert; keenly responsive    LOC Questions 1b: [0] = Answers both questions correctly    LOC Commands 1c: [0] = Performs both tasks correctly    Best Gaze 2: [0] = Normal    Visual 3: [0] = No visual loss    Facial Palsy 4: [0] = Normal symmetrical movements    Motor Arm Left 5a: [0] = No drift    Motor Arm Right 5b: [0] = No drift    Motor Leg Left 6a: [0] = No drift    Motor Leg Right 6b: [0] = No drift    Limb Ataxia 7: [0] = Absent    Sensory 8: [0] = Normal    Best Language 9: [0] = No aphasia    Dysarthria 10: [1] = Mild-to-moderate dysarthria    Extinction and Inattention 11: [0] = No abnormality    NIHSS Total: 1      # Exam  Vitals: vital signs reviewed    SBP: 182    DBP: 70      # PMH-FH-SH  Past Medical History:  hypertension  lymphedema      # Data  Time Head CT personally read by me (Central Time): 11/30/2021, 18:34    Head CT:  no bleed  preliminarily reviewed by me, please refer to radiology read for official reading      # Assessment  Impression:  Ischemic Stroke (Acute)  Patient in the 3-4.5 hour window, >80 years of age with mild sx, therefore risk>benefit of tpa at this time.      # Plan  Thrombolytic/Intervention: NOT IV Thrombolysis or IA Intervention candidate    Thrombolytic Exclusion (3-4.5 hour window):  age > 80    Intraarterial Exclusion:  clinically consistent with small  vessel disease  non-disabling    Target Blood Pressure: SBP < 220    Imaging: (urgency: STAT):  CT Angiogram Head and CT Angiogram Neck AND call back with results if abnormal    Other:  consult on-site neurology service for full work-up and evaluation recommendations  permissive hypertension  I have discussed my recommendations with the referring provider       Private Vehicle

## 2025-01-15 NOTE — ED PROVIDER NOTE - EKG #1 DATE/TIME
- NO ALCOHOL WITHIN 5 DAYS OF SURGERY  - NO NSAID PAIN MEDICATIONS WITHIN 5 DAYS OF SURGERY.   - FOLLOW DR HERNANDEZ'S INSTRUCTIONS FOR PRE-OPERATIVE PREPARATIONS.   
08-Feb-2019 18:19

## 2025-01-16 ENCOUNTER — INPATIENT (INPATIENT)
Facility: HOSPITAL | Age: 89
LOS: 10 days | Discharge: OTHER INSTITUTION | DRG: 66 | End: 2025-01-27
Attending: INTERNAL MEDICINE | Admitting: INTERNAL MEDICINE
Payer: MEDICARE

## 2025-01-16 VITALS
OXYGEN SATURATION: 96 % | HEART RATE: 140 BPM | RESPIRATION RATE: 18 BRPM | WEIGHT: 122.36 LBS | DIASTOLIC BLOOD PRESSURE: 93 MMHG | SYSTOLIC BLOOD PRESSURE: 145 MMHG

## 2025-01-16 LAB
ALBUMIN SERPL ELPH-MCNC: 3.2 G/DL — LOW (ref 3.5–5)
ALP SERPL-CCNC: 98 U/L — SIGNIFICANT CHANGE UP (ref 40–120)
ALT FLD-CCNC: 11 U/L DA — SIGNIFICANT CHANGE UP (ref 10–60)
ANION GAP SERPL CALC-SCNC: 12 MMOL/L — SIGNIFICANT CHANGE UP (ref 5–17)
APTT BLD: 30.6 SEC — SIGNIFICANT CHANGE UP (ref 24.5–35.6)
AST SERPL-CCNC: 33 U/L — SIGNIFICANT CHANGE UP (ref 10–40)
BASOPHILS # BLD AUTO: 0.04 K/UL — SIGNIFICANT CHANGE UP (ref 0–0.2)
BASOPHILS NFR BLD AUTO: 0.2 % — SIGNIFICANT CHANGE UP (ref 0–2)
BILIRUB SERPL-MCNC: 3.2 MG/DL — HIGH (ref 0.2–1.2)
BUN SERPL-MCNC: 24 MG/DL — HIGH (ref 7–18)
CALCIUM SERPL-MCNC: 9.8 MG/DL — SIGNIFICANT CHANGE UP (ref 8.4–10.5)
CHLORIDE SERPL-SCNC: 101 MMOL/L — SIGNIFICANT CHANGE UP (ref 96–108)
CK SERPL-CCNC: 231 U/L — HIGH (ref 21–215)
CO2 SERPL-SCNC: 19 MMOL/L — LOW (ref 22–31)
CREAT SERPL-MCNC: 0.76 MG/DL — SIGNIFICANT CHANGE UP (ref 0.5–1.3)
EGFR: 73 ML/MIN/1.73M2 — SIGNIFICANT CHANGE UP
EOSINOPHIL # BLD AUTO: 0 K/UL — SIGNIFICANT CHANGE UP (ref 0–0.5)
EOSINOPHIL NFR BLD AUTO: 0 % — SIGNIFICANT CHANGE UP (ref 0–6)
FLUAV AG NPH QL: SIGNIFICANT CHANGE UP
FLUBV AG NPH QL: SIGNIFICANT CHANGE UP
GLUCOSE SERPL-MCNC: 123 MG/DL — HIGH (ref 70–99)
HCT VFR BLD CALC: 48.6 % — HIGH (ref 34.5–45)
HGB BLD-MCNC: 16.3 G/DL — HIGH (ref 11.5–15.5)
IMM GRANULOCYTES NFR BLD AUTO: 0.7 % — SIGNIFICANT CHANGE UP (ref 0–0.9)
INR BLD: 1.45 RATIO — HIGH (ref 0.85–1.16)
LYMPHOCYTES # BLD AUTO: 0.47 K/UL — LOW (ref 1–3.3)
LYMPHOCYTES # BLD AUTO: 2.6 % — LOW (ref 13–44)
MAGNESIUM SERPL-MCNC: 1.8 MG/DL — SIGNIFICANT CHANGE UP (ref 1.6–2.6)
MANUAL SMEAR VERIFICATION: SIGNIFICANT CHANGE UP
MCHC RBC-ENTMCNC: 32 PG — SIGNIFICANT CHANGE UP (ref 27–34)
MCHC RBC-ENTMCNC: 33.5 G/DL — SIGNIFICANT CHANGE UP (ref 32–36)
MCV RBC AUTO: 95.3 FL — SIGNIFICANT CHANGE UP (ref 80–100)
MONOCYTES # BLD AUTO: 0.81 K/UL — SIGNIFICANT CHANGE UP (ref 0–0.9)
MONOCYTES NFR BLD AUTO: 4.5 % — SIGNIFICANT CHANGE UP (ref 2–14)
NEUTROPHILS # BLD AUTO: 16.61 K/UL — HIGH (ref 1.8–7.4)
NEUTROPHILS NFR BLD AUTO: 92 % — HIGH (ref 43–77)
NRBC # BLD: 0 /100 WBCS — SIGNIFICANT CHANGE UP (ref 0–0)
NRBC BLD-RTO: 0 /100 WBCS — SIGNIFICANT CHANGE UP (ref 0–0)
PHOSPHATE SERPL-MCNC: 3.7 MG/DL — SIGNIFICANT CHANGE UP (ref 2.5–4.5)
PLAT MORPH BLD: NORMAL — SIGNIFICANT CHANGE UP
PLATELET # BLD AUTO: 267 K/UL — SIGNIFICANT CHANGE UP (ref 150–400)
POTASSIUM SERPL-MCNC: 3.7 MMOL/L — SIGNIFICANT CHANGE UP (ref 3.5–5.3)
POTASSIUM SERPL-SCNC: 3.7 MMOL/L — SIGNIFICANT CHANGE UP (ref 3.5–5.3)
PROT SERPL-MCNC: 8.8 G/DL — HIGH (ref 6–8.3)
PROTHROM AB SERPL-ACNC: 16.9 SEC — HIGH (ref 9.9–13.4)
RBC # BLD: 5.1 M/UL — SIGNIFICANT CHANGE UP (ref 3.8–5.2)
RBC # FLD: 13.6 % — SIGNIFICANT CHANGE UP (ref 10.3–14.5)
RBC BLD AUTO: NORMAL — SIGNIFICANT CHANGE UP
RSV RNA NPH QL NAA+NON-PROBE: SIGNIFICANT CHANGE UP
SARS-COV-2 RNA SPEC QL NAA+PROBE: SIGNIFICANT CHANGE UP
SODIUM SERPL-SCNC: 132 MMOL/L — LOW (ref 135–145)
TROPONIN I, HIGH SENSITIVITY RESULT: 21.4 NG/L — SIGNIFICANT CHANGE UP
WBC # BLD: 18.06 K/UL — HIGH (ref 3.8–10.5)
WBC # FLD AUTO: 18.06 K/UL — HIGH (ref 3.8–10.5)

## 2025-01-16 PROCEDURE — 93010 ELECTROCARDIOGRAM REPORT: CPT

## 2025-01-16 PROCEDURE — 99285 EMERGENCY DEPT VISIT HI MDM: CPT

## 2025-01-16 PROCEDURE — 72170 X-RAY EXAM OF PELVIS: CPT | Mod: 26

## 2025-01-16 PROCEDURE — 71045 X-RAY EXAM CHEST 1 VIEW: CPT | Mod: 26

## 2025-01-16 RX ORDER — METOPROLOL SUCCINATE 25 MG
5 TABLET, EXTENDED RELEASE 24 HR ORAL ONCE
Refills: 0 | Status: COMPLETED | OUTPATIENT
Start: 2025-01-16 | End: 2025-01-16

## 2025-01-16 RX ORDER — BACTERIOSTATIC SODIUM CHLORIDE 0.9 %
1650 VIAL (ML) INJECTION ONCE
Refills: 0 | Status: COMPLETED | OUTPATIENT
Start: 2025-01-16 | End: 2025-01-16

## 2025-01-16 RX ORDER — CEFEPIME HCL 1 G
1000 IV SOLUTION, PIGGYBACK, BOTTLE (EA) INTRAVENOUS ONCE
Refills: 0 | Status: COMPLETED | OUTPATIENT
Start: 2025-01-16 | End: 2025-01-16

## 2025-01-16 NOTE — ED ADULT NURSE NOTE - OBJECTIVE STATEMENT
aox1-2 knows who they are. unknown of situation that occurred. ems states neighbor found patient on the ground at home AMS. notification

## 2025-01-16 NOTE — ED ADULT TRIAGE NOTE - CHIEF COMPLAINT QUOTE
Notification. Patient was found on floor by neighbor.  Last seen was yesterday.  As per EMT, patient has altered mental status.  alert, oriented to person.

## 2025-01-16 NOTE — ED PROVIDER NOTE - PROGRESS NOTE DETAILS
[place]-: Patient received at signout.  Signout from Dr. Mijares pending CTA results and admission.  CTA without evidence of large vessel occlusion, redemonstrated subacute CVA.  Dysphagia screen ordered.  Discussed with hospitalist and MAR regarding admission.

## 2025-01-16 NOTE — ED ADULT NURSE NOTE - CAS TRG GENERAL AIRWAY, MLM
Goals of Care: Information given to dtr.Reji. She will discuss with pt. and family and liaison will revisit 9-23-19. Katt Calvillo RN [place]

## 2025-01-16 NOTE — ED PROVIDER NOTE - PHYSICAL EXAMINATION
Exam:  General: Patient well appearing, tachycardic otherwise vital signs within normal limits  HEENT: airway patent with moist mucous membranes, no appreciated trauma  Cardiac: irregularly tachycardic S1/S2 with strong peripheral pulses  Respiratory: lungs clear without respiratory distress  GI: abdomen soft, non tender, non distended  Neuro: no gross neurologic deficits, CN II-XII intact, strength 5/5, SLTI  Skin: warm, well perfused  Psych: normal mood and affect

## 2025-01-16 NOTE — ED PROVIDER NOTE - CARE PLAN
1 Principal Discharge DX:	CVA (cerebrovascular accident)  Secondary Diagnosis:	Atrial fibrillation with rapid ventricular response  Secondary Diagnosis:	Leukocytosis

## 2025-01-16 NOTE — ED PROVIDER NOTE - OBJECTIVE STATEMENT
89-year-old woman with a past medical history of hypertension, A-fib presenting for change in mental status.  Per EMS patient had not been seen in at least a day and was not answering the door so a neighbor called EMS.  Per EMS they found her on the ground slightly confused.  Per the patient's niece she is normally alert and oriented x 3 and lives by herself.  She had been complaining of some cough and is being treated with doxycycline for bronchitis as of a few days ago.  Here in the emergency room patient is alert and oriented x 2, denying specific complaints

## 2025-01-16 NOTE — ED PROVIDER NOTE - CLINICAL SUMMARY MEDICAL DECISION MAKING FREE TEXT BOX
Patient presenting with acute altered mental status found down.  No obvious traumatic injury noted on exam.  Could be underlying infection given being reportedly treated for a bronchitis.  Will obtain imaging for occult traumatic injury, screening labs will require admission

## 2025-01-16 NOTE — ED ADULT NURSE NOTE - NSFALLHARMRISKINTERV_ED_ALL_ED
Assistance OOB with selected safe patient handling equipment if applicable/Communicate risk of Fall with Harm to all staff, patient, and family/Provide visual cue: red socks, yellow wristband, yellow gown, etc/Reinforce activity limits and safety measures with patient and family/Bed in lowest position, wheels locked, appropriate side rails in place/Call bell, personal items and telephone in reach/Instruct patient to call for assistance before getting out of bed/chair/stretcher/Non-slip footwear applied when patient is off stretcher/Ryan to call system/Physically safe environment - no spills, clutter or unnecessary equipment/Purposeful Proactive Rounding/Room/bathroom lighting operational, light cord in reach

## 2025-01-17 ENCOUNTER — RESULT REVIEW (OUTPATIENT)
Age: 89
End: 2025-01-17

## 2025-01-17 DIAGNOSIS — G93.41 METABOLIC ENCEPHALOPATHY: ICD-10-CM

## 2025-01-17 DIAGNOSIS — Z29.9 ENCOUNTER FOR PROPHYLACTIC MEASURES, UNSPECIFIED: ICD-10-CM

## 2025-01-17 DIAGNOSIS — I48.0 PAROXYSMAL ATRIAL FIBRILLATION: ICD-10-CM

## 2025-01-17 DIAGNOSIS — E78.5 HYPERLIPIDEMIA, UNSPECIFIED: ICD-10-CM

## 2025-01-17 DIAGNOSIS — I63.9 CEREBRAL INFARCTION, UNSPECIFIED: ICD-10-CM

## 2025-01-17 DIAGNOSIS — G93.40 ENCEPHALOPATHY, UNSPECIFIED: ICD-10-CM

## 2025-01-17 DIAGNOSIS — A31.0 PULMONARY MYCOBACTERIAL INFECTION: ICD-10-CM

## 2025-01-17 DIAGNOSIS — A41.9 SEPSIS, UNSPECIFIED ORGANISM: ICD-10-CM

## 2025-01-17 LAB
A1C WITH ESTIMATED AVERAGE GLUCOSE RESULT: 5.8 % — HIGH (ref 4–5.6)
ALBUMIN SERPL ELPH-MCNC: 2.7 G/DL — LOW (ref 3.5–5)
ALP SERPL-CCNC: 78 U/L — SIGNIFICANT CHANGE UP (ref 40–120)
ALT FLD-CCNC: 11 U/L DA — SIGNIFICANT CHANGE UP (ref 10–60)
ANION GAP SERPL CALC-SCNC: 12 MMOL/L — SIGNIFICANT CHANGE UP (ref 5–17)
APPEARANCE UR: CLEAR — SIGNIFICANT CHANGE UP
AST SERPL-CCNC: 25 U/L — SIGNIFICANT CHANGE UP (ref 10–40)
BACTERIA # UR AUTO: ABNORMAL /HPF
BASOPHILS # BLD AUTO: 0.03 K/UL — SIGNIFICANT CHANGE UP (ref 0–0.2)
BASOPHILS NFR BLD AUTO: 0.2 % — SIGNIFICANT CHANGE UP (ref 0–2)
BILIRUB SERPL-MCNC: 2.4 MG/DL — HIGH (ref 0.2–1.2)
BILIRUB UR-MCNC: NEGATIVE — SIGNIFICANT CHANGE UP
BUN SERPL-MCNC: 22 MG/DL — HIGH (ref 7–18)
CALCIUM SERPL-MCNC: 8.7 MG/DL — SIGNIFICANT CHANGE UP (ref 8.4–10.5)
CHLORIDE SERPL-SCNC: 108 MMOL/L — SIGNIFICANT CHANGE UP (ref 96–108)
CHOLEST SERPL-MCNC: 57 MG/DL — SIGNIFICANT CHANGE UP
CO2 SERPL-SCNC: 21 MMOL/L — LOW (ref 22–31)
COLOR SPEC: SIGNIFICANT CHANGE UP
CREAT SERPL-MCNC: 0.55 MG/DL — SIGNIFICANT CHANGE UP (ref 0.5–1.3)
DIFF PNL FLD: NEGATIVE — SIGNIFICANT CHANGE UP
EGFR: 86 ML/MIN/1.73M2 — SIGNIFICANT CHANGE UP
EOSINOPHIL # BLD AUTO: 0 K/UL — SIGNIFICANT CHANGE UP (ref 0–0.5)
EOSINOPHIL NFR BLD AUTO: 0 % — SIGNIFICANT CHANGE UP (ref 0–6)
EPI CELLS # UR: PRESENT
ESTIMATED AVERAGE GLUCOSE: 120 MG/DL — HIGH (ref 68–114)
GLUCOSE SERPL-MCNC: 100 MG/DL — HIGH (ref 70–99)
GLUCOSE UR QL: NEGATIVE MG/DL — SIGNIFICANT CHANGE UP
HCT VFR BLD CALC: 42.8 % — SIGNIFICANT CHANGE UP (ref 34.5–45)
HDLC SERPL-MCNC: 24 MG/DL — LOW
HGB BLD-MCNC: 14.5 G/DL — SIGNIFICANT CHANGE UP (ref 11.5–15.5)
IMM GRANULOCYTES NFR BLD AUTO: 0.6 % — SIGNIFICANT CHANGE UP (ref 0–0.9)
KETONES UR-MCNC: 40 MG/DL
LACTATE SERPL-SCNC: 2 MMOL/L — SIGNIFICANT CHANGE UP (ref 0.7–2)
LACTATE SERPL-SCNC: 2.4 MMOL/L — HIGH (ref 0.7–2)
LEUKOCYTE ESTERASE UR-ACNC: NEGATIVE — SIGNIFICANT CHANGE UP
LIPID PNL WITH DIRECT LDL SERPL: 19 MG/DL — SIGNIFICANT CHANGE UP
LYMPHOCYTES # BLD AUTO: 0.52 K/UL — LOW (ref 1–3.3)
LYMPHOCYTES # BLD AUTO: 3.2 % — LOW (ref 13–44)
MAGNESIUM SERPL-MCNC: 1.8 MG/DL — SIGNIFICANT CHANGE UP (ref 1.6–2.6)
MCHC RBC-ENTMCNC: 32.5 PG — SIGNIFICANT CHANGE UP (ref 27–34)
MCHC RBC-ENTMCNC: 33.9 G/DL — SIGNIFICANT CHANGE UP (ref 32–36)
MCV RBC AUTO: 96 FL — SIGNIFICANT CHANGE UP (ref 80–100)
MONOCYTES # BLD AUTO: 1.2 K/UL — HIGH (ref 0–0.9)
MONOCYTES NFR BLD AUTO: 7.5 % — SIGNIFICANT CHANGE UP (ref 2–14)
NEUTROPHILS # BLD AUTO: 14.2 K/UL — HIGH (ref 1.8–7.4)
NEUTROPHILS NFR BLD AUTO: 88.5 % — HIGH (ref 43–77)
NITRITE UR-MCNC: NEGATIVE — SIGNIFICANT CHANGE UP
NON HDL CHOLESTEROL: 33 MG/DL — SIGNIFICANT CHANGE UP
NRBC # BLD: 0 /100 WBCS — SIGNIFICANT CHANGE UP (ref 0–0)
NRBC BLD-RTO: 0 /100 WBCS — SIGNIFICANT CHANGE UP (ref 0–0)
PH UR: 5.5 — SIGNIFICANT CHANGE UP (ref 5–8)
PHOSPHATE SERPL-MCNC: 3 MG/DL — SIGNIFICANT CHANGE UP (ref 2.5–4.5)
PLATELET # BLD AUTO: 218 K/UL — SIGNIFICANT CHANGE UP (ref 150–400)
POTASSIUM SERPL-MCNC: 3.8 MMOL/L — SIGNIFICANT CHANGE UP (ref 3.5–5.3)
POTASSIUM SERPL-SCNC: 3.8 MMOL/L — SIGNIFICANT CHANGE UP (ref 3.5–5.3)
PROT SERPL-MCNC: 7.6 G/DL — SIGNIFICANT CHANGE UP (ref 6–8.3)
PROT UR-MCNC: 30 MG/DL
RBC # BLD: 4.46 M/UL — SIGNIFICANT CHANGE UP (ref 3.8–5.2)
RBC # FLD: 14.2 % — SIGNIFICANT CHANGE UP (ref 10.3–14.5)
RBC CASTS # UR COMP ASSIST: 0 /HPF — SIGNIFICANT CHANGE UP (ref 0–4)
SODIUM SERPL-SCNC: 141 MMOL/L — SIGNIFICANT CHANGE UP (ref 135–145)
SP GR SPEC: 1.05 — HIGH (ref 1–1.03)
TRIGL SERPL-MCNC: 58 MG/DL — SIGNIFICANT CHANGE UP
UROBILINOGEN FLD QL: 1 MG/DL — SIGNIFICANT CHANGE UP (ref 0.2–1)
WBC # BLD: 16.04 K/UL — HIGH (ref 3.8–10.5)
WBC # FLD AUTO: 16.04 K/UL — HIGH (ref 3.8–10.5)
WBC UR QL: 0 /HPF — SIGNIFICANT CHANGE UP (ref 0–5)

## 2025-01-17 PROCEDURE — 99223 1ST HOSP IP/OBS HIGH 75: CPT

## 2025-01-17 PROCEDURE — 70498 CT ANGIOGRAPHY NECK: CPT | Mod: 26

## 2025-01-17 PROCEDURE — 70496 CT ANGIOGRAPHY HEAD: CPT | Mod: 26

## 2025-01-17 RX ORDER — ACETAMINOPHEN, DIPHENHYDRAMINE HCL, PHENYLEPHRINE HCL 325; 25; 5 MG/1; MG/1; MG/1
3 TABLET ORAL AT BEDTIME
Refills: 0 | Status: DISCONTINUED | OUTPATIENT
Start: 2025-01-17 | End: 2025-01-27

## 2025-01-17 RX ORDER — ASPIRIN 81 MG/1
300 TABLET, COATED ORAL ONCE
Refills: 0 | Status: COMPLETED | OUTPATIENT
Start: 2025-01-17 | End: 2025-01-17

## 2025-01-17 RX ORDER — METOPROLOL SUCCINATE 25 MG
5 TABLET, EXTENDED RELEASE 24 HR ORAL EVERY 6 HOURS
Refills: 0 | Status: DISCONTINUED | OUTPATIENT
Start: 2025-01-17 | End: 2025-01-21

## 2025-01-17 RX ORDER — SODIUM CHLORIDE 9 G/ML
1000 INJECTION, SOLUTION INTRAVENOUS
Refills: 0 | Status: DISCONTINUED | OUTPATIENT
Start: 2025-01-17 | End: 2025-01-19

## 2025-01-17 RX ORDER — ONDANSETRON 4 MG/1
4 TABLET, ORALLY DISINTEGRATING ORAL EVERY 8 HOURS
Refills: 0 | Status: DISCONTINUED | OUTPATIENT
Start: 2025-01-17 | End: 2025-01-27

## 2025-01-17 RX ORDER — CEFTRIAXONE 250 MG/1
1000 INJECTION, POWDER, FOR SOLUTION INTRAMUSCULAR; INTRAVENOUS EVERY 24 HOURS
Refills: 0 | Status: DISCONTINUED | OUTPATIENT
Start: 2025-01-17 | End: 2025-01-20

## 2025-01-17 RX ORDER — MAGNESIUM, ALUMINUM HYDROXIDE 200-225/5
30 SUSPENSION, ORAL (FINAL DOSE FORM) ORAL EVERY 4 HOURS
Refills: 0 | Status: DISCONTINUED | OUTPATIENT
Start: 2025-01-17 | End: 2025-01-27

## 2025-01-17 RX ORDER — ENOXAPARIN SODIUM 100 MG/ML
40 INJECTION SUBCUTANEOUS EVERY 24 HOURS
Refills: 0 | Status: DISCONTINUED | OUTPATIENT
Start: 2025-01-17 | End: 2025-01-19

## 2025-01-17 RX ORDER — ACETAMINOPHEN 160 MG/5ML
650 SUSPENSION ORAL EVERY 6 HOURS
Refills: 0 | Status: DISCONTINUED | OUTPATIENT
Start: 2025-01-17 | End: 2025-01-27

## 2025-01-17 RX ADMIN — ENOXAPARIN SODIUM 40 MILLIGRAM(S): 100 INJECTION SUBCUTANEOUS at 17:47

## 2025-01-17 RX ADMIN — SODIUM CHLORIDE 90 MILLILITER(S): 9 INJECTION, SOLUTION INTRAVENOUS at 11:55

## 2025-01-17 RX ADMIN — Medication 1650 MILLILITER(S): at 00:26

## 2025-01-17 RX ADMIN — Medication 5 MILLIGRAM(S): at 00:26

## 2025-01-17 RX ADMIN — Medication 5 MILLIGRAM(S): at 20:16

## 2025-01-17 RX ADMIN — Medication 100 MILLIGRAM(S): at 00:27

## 2025-01-17 RX ADMIN — CEFTRIAXONE 100 MILLIGRAM(S): 250 INJECTION, POWDER, FOR SOLUTION INTRAMUSCULAR; INTRAVENOUS at 20:16

## 2025-01-17 RX ADMIN — ASPIRIN 300 MILLIGRAM(S): 81 TABLET, COATED ORAL at 06:47

## 2025-01-17 RX ADMIN — SODIUM CHLORIDE 90 MILLILITER(S): 9 INJECTION, SOLUTION INTRAVENOUS at 06:34

## 2025-01-17 RX ADMIN — SODIUM CHLORIDE 90 MILLILITER(S): 9 INJECTION, SOLUTION INTRAVENOUS at 20:25

## 2025-01-17 RX ADMIN — Medication 5 MILLIGRAM(S): at 07:57

## 2025-01-17 RX ADMIN — Medication 125 MICROGRAM(S): at 11:55

## 2025-01-17 NOTE — H&P ADULT - PROBLEM SELECTOR PLAN 1
patient found on the floor at home  CTH showing subacute infarct  Patient meeting SIRS criteria HR>90 and WBC>13k  2/2 CVA vs infectious  s/p ASA suppository  neuro consult

## 2025-01-17 NOTE — H&P ADULT - HISTORY OF PRESENT ILLNESS
doctors appointment 2 days ago dr maxwell  found by neighbor  found on floor near couch  walks with cane  recently sick with bronchitis on antibiotics from urgent care  [facility] local pharmacy     89F from home walks with cane PMH of paroxysmal Afib, SVT, HLD, rheumatoid arthritis; Mycobacterium avium complex presented with acute mental status changes. Patient is AOx3 at baseline, living alone. Collateral obtained by niece. Patient was last seen 2 days ago and may have had a doctors appointment with Dr Rhodes, her PCP. Patient was found by her neighbor on the floor in her apartment.     89F from home walks with cane PMH of paroxysmal Afib, SVT, HLD, rheumatoid arthritis; Mycobacterium avium complex presented with acute mental status changes. Patient is AOx3 at baseline, living alone. Collateral obtained by niece. Patient was last seen 2 days ago and may have had a doctors appointment with Dr Rhodes, her PCP. Patient was found by her neighbor on the floor in her apartment.

## 2025-01-17 NOTE — H&P ADULT - NSICDXPASTMEDICALHX_GEN_ALL_CORE_FT
PAST MEDICAL HISTORY:  HTN (hypertension)     JACINTA (mycobacterium avium-intracellulare)     Paroxysmal atrial fibrillation

## 2025-01-17 NOTE — H&P ADULT - NSHPPHYSICALEXAM_GEN_ALL_CORE
PHYSICAL EXAMINATION:  GENERAL: confused, dry mucous membranes  HEAD:  Atraumatic, Normocephalic  EYES:  conjunctiva and sclera clear  NECK: Supple, No JVD, Normal thyroid  CHEST/LUNG: Clear to auscultation.  No rales, rhonchi, wheezing, or rubs  HEART: Regular rate and rhythm; No murmurs, rubs, or gallops  ABDOMEN: Soft, Nontender, Nondistended; Bowel sounds present  NERVOUS SYSTEM:  Alert & Oriented X0-1  SKIN: warm dry    T(C): 36.6 (01-17-25 @ 04:00), Max: 36.6 (01-16-25 @ 22:15)  HR: 109 (01-17-25 @ 04:00) (109 - 140)  BP: 134/84 (01-17-25 @ 04:00) (111/89 - 145/93)  RR: 20 (01-17-25 @ 04:00) (18 - 20)  SpO2: 100% (01-17-25 @ 04:00) (96% - 100%)

## 2025-01-17 NOTE — PATIENT PROFILE ADULT - FALL HARM RISK - HARM RISK INTERVENTIONS

## 2025-01-17 NOTE — H&P ADULT - PROBLEM SELECTOR PLAN 2
patient presenting with sided deficits  CTH showing subacute infarct  EKG showed tachy w RBBB  out of window for TNK  aspirin suppository  f/u TTE  f/u MRI  f/u lipid panel, A1c  q4 neuro checks  aspiration precautions  dysphagia screen failed  f/u speech and swallow  Neuro

## 2025-01-17 NOTE — CONSULT NOTE ADULT - ASSESSMENT
AMS  afib  found on floor unclear reasaon    mri melody r/o cva or mass  eeg eval sz  infectious w/p per primary team AMS in patient with pAfib, unclear etiology of encephalopathy but should consider CVA as   found on floor unclear reason and has Afib and syncope as   has dry mucosal membranes    Recommend:  tele  mri melody r/o cva or mass  TTE, LDL/hbA1C  consider AC, if AC add lipitor 40mg for secondary stroke prevention (othewrwise if no AC, add asa 81mg and plavix later for 3 weeks)  eeg eval sz  orthostatic VS as pt dry and found on floor  infectious w/p per primary team    time spent 80min

## 2025-01-17 NOTE — CONSULT NOTE ADULT - SUBJECTIVE AND OBJECTIVE BOX
MR:- [id number] :  NAME:KASI WARNER:-    DATE OF SERVICE:01-17-25 @ 09:57    Patient was seen,examined and evaluated  by Adolfo Bah MD [id number] @ 09:57 .  ER evaluation, Labs and Hospital course was reviewed,    CHIEF COMPLAINT:CVA    HPI:HPI:  89F from home walks with cane PMH of paroxysmal Afib, SVT, HLD, rheumatoid arthritis; Mycobacterium avium complex presented with acute mental status changes. Patient is AOx3 at baseline, living alone. Collateral obtained by niece. Patient was last seen 2 days ago and may have had a doctors appointment with Dr Rhodes, her PCP. Patient was found by her neighbor on the floor in her apartment.     (17 Jan 2025 06:49)        CARDIAC HISTORY:  [ ] CAD [ [PCI [ ] CABG [ ] Prior Cath  [ ] Atrial Fibrillation  Devices[ ] PPM [ ] ICD [ ]ILR  Heart Failure [ ] HFrEF [ ] HFpEF    PAST MEDICAL & SURGICAL HISTORY:  Paroxysmal atrial fibrillation      HTN (hypertension)      JACINTA (mycobacterium avium-intracellulare)          MEDICATIONS  (STANDING):  cefTRIAXone   IVPB 1000 milliGRAM(s) IV Intermittent every 24 hours  enoxaparin Injectable 40 milliGRAM(s) SubCutaneous every 24 hours  lactated ringers. 1000 milliLiter(s) (90 mL/Hr) IV Continuous <Continuous>    MEDICATIONS  (PRN):  acetaminophen     Tablet .. 650 milliGRAM(s) Oral every 6 hours PRN Temp greater or equal to 38C (100.4F), Mild Pain (1 - 3)  aluminum hydroxide/magnesium hydroxide/simethicone Suspension 30 milliLiter(s) Oral every 4 hours PRN Dyspepsia  melatonin 3 milliGRAM(s) Oral at bedtime PRN Insomnia  metoprolol tartrate Injectable 5 milliGRAM(s) IV Push every 6 hours PRN HR >120  ondansetron Injectable 4 milliGRAM(s) IV Push every 8 hours PRN Nausea and/or Vomiting      FAMILY HISTORY:    No family history of premature coronary artery disease or sudden cardiac death    SOCIAL HISTORY:  Smoking-[ ] Active  [ ] Former [ ] Non Smoker  Alcohol-[ ] Denies [ ] Social [ ] Daily  Ilicit Drug use-[ ] Denies [ ] Active user    REVIEW OF SYSTEMS:  Constitutional: [ ] fever, [ ]weight loss, [ ]fatigue   Activity [ ] Bedbound,[ ] Ambulates [ ] Unassisted[ ] Cane/Walker [ ] Assistence.  Effort tolerance:[ ] Excellent [ ] Good [ ] Fair [ ] Poor [ ]  Eyes: [ ] visual changes  Respiratory: [ ]shortness of breath;  [ ] cough, [ ]wheezing, [ ]chills, [ ]hemoptysis  Cardiovascular: [ ] chest pain, [ ]palpitations, [ ]dizziness,  [ ]leg swelling[ ]orthopnea [ ]PND  Gastrointestinal: [ ] abdominal pain, [ ]nausea, [ ]vomiting,  [ ]diarrhea,[ ]constipation  Genitourinary: [ ] dysuria, [ ] hematuria  Neurologic: [ ] headaches [ ] tremors[ ] weakness  Skin: [ ] itching, [ ]burning, [ ] rashes  Endocrine: [ ] heat or cold intolerance  Musculoskeletal: [ ] joint pain or swelling; [ ] muscle, back, or extremity pain  Psychiatric: [ ] depression, [ ]anxiety, [ ]mood swings, or [ ]difficulty sleeping  Hematologic: [ ] easy bruising, [ ] bleeding gums       [ x] All others negative	  [ ] Unable to obtain    Vital Signs Last 24 Hrs  T(C): 36.4 (17 Jan 2025 07:40), Max: 36.6 (16 Jan 2025 22:15)  T(F): 97.6 (17 Jan 2025 07:40), Max: 97.8 (16 Jan 2025 22:15)  HR: 112 (17 Jan 2025 08:24) (109 - 140)  BP: 126/95 (17 Jan 2025 08:24) (111/89 - 145/93)  BP(mean): --  RR: 18 (17 Jan 2025 07:40) (18 - 20)  SpO2: 100% (17 Jan 2025 07:40) (96% - 100%)    Parameters below as of 17 Jan 2025 07:40  Patient On (Oxygen Delivery Method): room air      I&O's Summary      PHYSICAL EXAM:  General: No acute distress BMI-  HEENT: EOMI, PERRL[ ] Icteric  Neck: Supple, [ ] JVD  Lungs: Equal air entry bilaterally; [ ] Rales [ ] Rhonchi [ ] Wheezing  Heart: Regular rate and rhythm;[ ] Murmurs-   /6 [ ] Systolic [ ] Diastolic [ ] Radiation,No rubs, or gallops  Abdomen: Nontender, bowel sounds present  Extremities: No clubbing, cyanosis, [ ] edema[ ] Calf tenderness  Nervous system:  Alert & Oriented X3, no focal deficits  Psychiatric: Normal affect  Skin: No rashes or lesions      LABS:  01-16    132[L]  |  101  |  24[H]  ----------------------------<  123[H]  3.7   |  19[L]  |  0.76    Ca    9.8      16 Jan 2025 23:02  Phos  3.7     01-16  Mg     1.8     01-16    TPro  8.8[H]  /  Alb  3.2[L]  /  TBili  3.2[H]  /  DBili  x   /  AST  33  /  ALT  11  /  AlkPhos  98  01-16    Creatinine Trend: 0.76<--                        16.3   18.06 )-----------( 267      ( 16 Jan 2025 23:02 )             48.6     PT/INR - ( 16 Jan 2025 23:02 )   PT: 16.9 sec;   INR: 1.45 ratio         PTT - ( 16 Jan 2025 23:02 )  PTT:30.6 sec        < from: CT Angio Head w/ IV Cont (01.17.25 @ 01:28) >  IMPRESSION:    CT BRAIN: Stable region of hypodensity in the right caudate nucleus,   corona radiata and right lentiform nucleussuspicious for subacute   infarct. No acute hemorrhage.    CTA NECK:  No significant stenosis of the cervical carotid or vertebral   arteries.    CTA HEAD:  No significant stenosis or occlusion of the major proximal   branches of the [place] of [place].    < end of copied text >

## 2025-01-17 NOTE — CONSULT NOTE ADULT - SUBJECTIVE AND OBJECTIVE BOX
***TEMPLATE ONLY***      Patient is a 89y old  Female who presents with a chief complaint of acute encephalopathy , subacute stroke (17 Jan 2025 09:56)      HPI:  89F from home walks with cane PMH of paroxysmal Afib, SVT, HLD, rheumatoid arthritis; Mycobacterium avium complex presented with acute mental status changes. Patient is AOx3 at baseline, living alone. Collateral obtained by niece. Patient was last seen 2 days ago and may have had a doctors appointment with Dr Rhodes, her PCP. Patient was found by her neighbor on the floor in her apartment.     (17 Jan 2025 06:49)         Neurological Review of Systems:  No difficulty with language.  No vision loss or double vision.  No dizziness, vertigo or new hearing loss.  No difficulty with speech or swallowing.  No focal weakness.  No focal sensory changes.  No numbness or tingling in the bilateral lower extremities.  No difficulty with balance.  No difficulty with ambulation.        MEDICATIONS  (STANDING):  cefTRIAXone   IVPB 1000 milliGRAM(s) IV Intermittent every 24 hours  enoxaparin Injectable 40 milliGRAM(s) SubCutaneous every 24 hours  lactated ringers. 1000 milliLiter(s) (90 mL/Hr) IV Continuous <Continuous>    MEDICATIONS  (PRN):  acetaminophen     Tablet .. 650 milliGRAM(s) Oral every 6 hours PRN Temp greater or equal to 38C (100.4F), Mild Pain (1 - 3)  aluminum hydroxide/magnesium hydroxide/simethicone Suspension 30 milliLiter(s) Oral every 4 hours PRN Dyspepsia  melatonin 3 milliGRAM(s) Oral at bedtime PRN Insomnia  metoprolol tartrate Injectable 5 milliGRAM(s) IV Push every 6 hours PRN HR >120  ondansetron Injectable 4 milliGRAM(s) IV Push every 8 hours PRN Nausea and/or Vomiting    Allergies    No Known Allergies    Intolerances      PAST MEDICAL & SURGICAL HISTORY:  Paroxysmal atrial fibrillation      HTN (hypertension)      JACINTA (mycobacterium avium-intracellulare)        FAMILY HISTORY:    SOCIAL HISTORY: non smoker/ former smoker/ active smoker    Review of Systems:  Constitutional: No generalized weakness. No fevers or chills.                    Eyes, Ears, Mouth, Throat: No vision loss   Respiratory: No shortness of breath or cough.                                Cardiovascular: No chest pain or palpitations  Gastrointestinal: No nausea or vomiting.                                         Genitourinary: No urinary incontinence or burning on urination.  Musculoskeletal: No joint pain.                                                           Dermatologic: No rash.  Neurological: as per HPI                                                                      Psychiatric: No behavioral problems.  Endocrine: No known hypoglycemia.               Hematologic/Lymphatic: No easy bleeding.    O:  Vital Signs Last 24 Hrs  T(C): 36.6 (17 Jan 2025 11:17), Max: 36.6 (16 Jan 2025 22:15)  T(F): 97.9 (17 Jan 2025 11:17), Max: 97.9 (17 Jan 2025 11:17)  HR: 125 (17 Jan 2025 11:42) (109 - 140)  BP: 135/88 (17 Jan 2025 11:42) (111/89 - 145/93)  BP(mean): --  RR: 18 (17 Jan 2025 11:17) (18 - 20)  SpO2: 99% (17 Jan 2025 11:17) (96% - 100%)    Parameters below as of 17 Jan 2025 11:17  Patient On (Oxygen Delivery Method): room air        General Exam:   General appearance: No acute distress                 Cardiovascular: Pedal dorsalis pulses intact bilaterally    Mental Status: Orientated to self, date and place.  Attention intact.  No dysarthria, aphasia or neglect.  Knowledge intact.  Registration intact.  Short and long term memory grossly intact.      Cranial Nerves: CN I - not tested.  PERRL, EOMI, VFF, no nystagmus or diplopia.  No APD.  Fundi not visualized.  CN V1-3 intact to light touch and pinprick.  No facial asymmetry.  Hearing intact to finger rub bilaterally.  Tongue, uvula and palate midline.  Sternocleidomastoid and Trapezius intact bilaterally.    Motor:   Tone: normal.                  Strength intact throughout  No pronator drift bilaterally                      No dysmetria on finger-nose-finger or heel-shin-heel  No truncal ataxia.  No resting, postural or action tremor.  No myoclonus.    Sensation: intact to light touch, pinprick, vibration and proprioception    Deep Tendon Reflexes: 1+ bilateral biceps, triceps, brachioradialis, knee and ankle  Toes flexor bilaterally    Gait: normal and stable.  Rhomberg -tomas.    Other:     LABS:                        14.5   16.04 )-----------( 218      ( 17 Jan 2025 10:38 )             42.8     01-17    141  |  108  |  22[H]  ----------------------------<  100[H]  3.8   |  21[L]  |  0.55    Ca    8.7      17 Jan 2025 10:38  Phos  3.0     01-17  Mg     1.8     01-17    TPro  7.6  /  Alb  2.7[L]  /  TBili  2.4[H]  /  DBili  x   /  AST  25  /  ALT  11  /  AlkPhos  78  01-17    PT/INR - ( 16 Jan 2025 23:02 )   PT: 16.9 sec;   INR: 1.45 ratio         PTT - ( 16 Jan 2025 23:02 )  PTT:30.6 sec  Urinalysis Basic - ( 17 Jan 2025 10:38 )    Color: x / Appearance: x / SG: x / pH: x  Gluc: 100 mg/dL / Ketone: x  / Bili: x / Urobili: x   Blood: x / Protein: x / Nitrite: x   Leuk Esterase: x / RBC: x / WBC x   Sq Epi: x / Non Sq Epi: x / Bacteria: x          RADIOLOGY & ADDITIONAL STUDIES:    EKG:  tele:  TTE:  EEG:   Patient is a 89y old  Female who presents with a chief complaint of acute encephalopathy , subacute stroke (17 Jan 2025 09:56)      HPI:  89F from home walks with cane PMH of paroxysmal Afib, SVT, HLD, rheumatoid arthritis; Mycobacterium avium complex presented with acute mental status changes. Patient is AOx3 at baseline, living alone. Collateral obtained by niece. Patient was last seen 2 days ago and may have had a doctors appointment with Dr Rhodes, her PCP. Patient was found by her neighbor on the floor in her apartment.     (17 Jan 2025 06:49)         Neurological Review of Systems:  unable to obtain currently      MEDICATIONS  (STANDING):  cefTRIAXone   IVPB 1000 milliGRAM(s) IV Intermittent every 24 hours  enoxaparin Injectable 40 milliGRAM(s) SubCutaneous every 24 hours  lactated ringers. 1000 milliLiter(s) (90 mL/Hr) IV Continuous <Continuous>    MEDICATIONS  (PRN):  acetaminophen     Tablet .. 650 milliGRAM(s) Oral every 6 hours PRN Temp greater or equal to 38C (100.4F), Mild Pain (1 - 3)  aluminum hydroxide/magnesium hydroxide/simethicone Suspension 30 milliLiter(s) Oral every 4 hours PRN Dyspepsia  melatonin 3 milliGRAM(s) Oral at bedtime PRN Insomnia  metoprolol tartrate Injectable 5 milliGRAM(s) IV Push every 6 hours PRN HR >120  ondansetron Injectable 4 milliGRAM(s) IV Push every 8 hours PRN Nausea and/or Vomiting    Allergies    No Known Allergies    Intolerances      PAST MEDICAL & SURGICAL HISTORY:  Paroxysmal atrial fibrillation      HTN (hypertension)      JACINTA (mycobacterium avium-intracellulare)        FAMILY HISTORY: nc parents    SOCIAL HISTORY: non smoker    Review of Systems:  Constitutional:  No fevers.                    Eyes, Ears, Mouth, Throat: No vision loss   Respiratory: No cough.                                Cardiovascular: No chest pain  Gastrointestinal: No  vomiting.                                         Genitourinary: unknown if has burning on urination.  Musculoskeletal: No joint pain.                                                           Dermatologic: No rash.  Neurological: as per HPI                                                                      Psychiatric: No behavioral problems.  Endocrine: No known hypoglycemia.               Hematologic/Lymphatic: No easy bleeding.    O:  Vital Signs Last 24 Hrs  T(C): 36.6 (17 Jan 2025 11:17), Max: 36.6 (16 Jan 2025 22:15)  T(F): 97.9 (17 Jan 2025 11:17), Max: 97.9 (17 Jan 2025 11:17)  HR: 125 (17 Jan 2025 11:42) (109 - 140)  BP: 135/88 (17 Jan 2025 11:42) (111/89 - 145/93)  BP(mean): --  RR: 18 (17 Jan 2025 11:17) (18 - 20)  SpO2: 99% (17 Jan 2025 11:17) (96% - 100%)    Parameters below as of 17 Jan 2025 11:17  Patient On (Oxygen Delivery Method): room air        General Exam:   General appearance: No acute distress                 Cardiovascular: Pedal dorsalis pulses intact bilaterally    Mental Status: Orientated to self but not to date and place.  lethargic.  NO gross dysarthria, aphasia or neglect.  Knowledge,  Registration and memory unreliable.    Cranial Nerves: CN I - not tested.  PERRL, EOMI, VFF, no nystagmus or diplopia.  No APD.  Fundi not visualized.  CN V1-3 intact to light touch.  No facial asymmetry.  Hearing intact to finger rub bilaterally.  Tongue, uvula and palate midline.  Sternocleidomastoid and Trapezius intact bilaterally.    Motor:   Tone: normal.                  Strength grossly intact throughout  pt unable to cooperate to check dysmetria on finger-nose-finger or heel-shin-heel  No truncal ataxia.  No resting, postural or action tremor.  No myoclonus.    Sensation: intact to light touch    Deep Tendon Reflexes: 1+ bilateral biceps, triceps, brachioradialis, knee and ankle  Toes flexor bilaterally    Gait: unable currently  Other: very dry mucosal membranes  NIHSS 6, MRS 3    LABS:                        14.5   16.04 )-----------( 218      ( 17 Jan 2025 10:38 )             42.8     01-17    141  |  108  |  22[H]  ----------------------------<  100[H]  3.8   |  21[L]  |  0.55    Ca    8.7      17 Jan 2025 10:38  Phos  3.0     01-17  Mg     1.8     01-17    TPro  7.6  /  Alb  2.7[L]  /  TBili  2.4[H]  /  DBili  x   /  AST  25  /  ALT  11  /  AlkPhos  78  01-17    PT/INR - ( 16 Jan 2025 23:02 )   PT: 16.9 sec;   INR: 1.45 ratio         PTT - ( 16 Jan 2025 23:02 )  PTT:30.6 sec  Urinalysis Basic - ( 17 Jan 2025 10:38 )    Color: x / Appearance: x / SG: x / pH: x  Gluc: 100 mg/dL / Ketone: x  / Bili: x / Urobili: x   Blood: x / Protein: x / Nitrite: x   Leuk Esterase: x / RBC: x / WBC x   Sq Epi: x / Non Sq Epi: x / Bacteria: x          RADIOLOGY & ADDITIONAL STUDIES:    < from: CT Angio Head w/ IV Cont (01.17.25 @ 01:28) >  CT BRAIN: Stable region of hypodensity in the right caudate nucleus,   corona radiata and right lentiform nucleussuspicious for subacute   infarct. No acute hemorrhage.    CTA NECK:  No significant stenosis of the cervical carotid or vertebral   arteries.    CTA HEAD:  No significant stenosis or occlusion of the major proximal   branches of the [place].    < end of copied text >

## 2025-01-17 NOTE — H&P ADULT - ASSESSMENT
89F with a PMH of paroxysmal Afib, SVT, HLD, rheumatoid arthritis; Mycobacterium avium complex admitted for acute encephalopathy and subacute CVA.    PATIENT DID NOT RECALL MEDICATIONS AND  EMERGENCY CONTACT COULD NOT RECALL .  PRIMARY TEAM TO CONFIRM MED REC. DR [facility] MAY HAVE MEDS. 89F with a PMH of paroxysmal Afib, SVT, HLD, rheumatoid arthritis; Mycobacterium avium complex admitted for acute encephalopathy and subacute CVA.    PATIENT DID NOT RECALL MEDICATIONS AND  EMERGENCY CONTACT COULD NOT RECALL .  PRIMARY TEAM TO CONFIRM MED REC. DR [facility] MAY HAVE MEDS.

## 2025-01-17 NOTE — CHART NOTE - NSCHARTNOTEFT_GEN_A_CORE
89 year old woman w/PMHx of Paroxysmal A-fib, SVT, HLD brought from home confused and altered after she was found down > 24 hours after she was last seen in her normal functioning state.   Work up in the ED shows  hypoxic-ischemic encephalopathy likely related to possible subacute infarct in the right basal ganglia admitted for further management of possible acute stroke.     On bedside evaluation no complaints in the AM.    Exam:  NAD   tachycardiac in afib   CTABL  abdo soft non tender   ext wwp   anox2 baseline no FND currently       Labs reviewed  wbc 18k  hgb 16.3    INR 1.45  PT 16.9  lactate 2.4 --> 2    Na 132  HCO3 - 19  BUN 24  TB 3.2        UA - ketone     CT BRAIN  SOFT TISSUES: Moderate pleural parenchymal scarring the lung apices,   right greater than left.    IMPRESSION:  CT BRAIN: Stable region of hypodensity in the right caudate nucleus,   corona radiata and right lentiform nucleus suspicious for subacute   infarct. No acute hemorrhage.    CTA NECK:  No significant stenosis of the cervical carotid or vertebral   arteries.    CTA HEAD:  No significant stenosis or occlusion of the major proximal   branches of the [place].      A/P:  #Acute vs Subacute infarct   #Afib intermittent RVR     - monitor tele  - failed bedside swallow test, npo, SLP  - Serial NIHSS f/u Neuro Dr. Longo   - s/p ASA per rectum  - f/u Lipid panel A1c, vit D  - f/u TTE    - f/u MRI brain non con, EEG, orthostatics   - cannot swallow right now no po meds   - PT evaluation and mgt  - Med reconciliation

## 2025-01-17 NOTE — PATIENT PROFILE ADULT - FLU SEASON?
SUBJECTIVE:  Jocelyn Moon is a 59 year old female comes in with approximate 2-week history of URI related symptoms.  Patient's had nasal congestion now with increasing facial pain and pressure along with upper dental pain.  She does have a cough that is slightly productive and seems worse at night.  She denies any shortness of breath or chest pain.  She is having some pressure now in both of her ears.  Has been using over-the-counter medications for symptomatic relief.  Denies any high fevers.  She otherwise at baseline health.    No past medical history on file.  There is no problem list on file for this patient.    No current outpatient medications on file.     No current facility-administered medications for this visit.     Social History     Socioeconomic History    Marital status:      Spouse name: Not on file    Number of children: Not on file    Years of education: Not on file    Highest education level: Not on file   Occupational History    Not on file   Tobacco Use    Smoking status: Never    Smokeless tobacco: Never   Vaping Use    Vaping status: Never Used   Substance and Sexual Activity    Alcohol use: Not on file    Drug use: Not on file    Sexual activity: Not on file   Other Topics Concern    Not on file   Social History Narrative    Not on file     Social Determinants of Health     Financial Resource Strain: Not on file   Food Insecurity: Not on file   Transportation Needs: Not on file   Physical Activity: Not on file   Stress: Not on file   Social Connections: Not on file   Interpersonal Safety: Not on file   Housing Stability: Not on file     ROS  negative other than stated above    Exam:  GENERAL APPEARANCE: healthy, alert and no distress  EYES: EOMI,  PERRL  HENT: TMs and canals clear bilaterally.  Oral mucosa moist with no erythema or exudate noted.  Nasal turbinates erythematous and swollen.  Postnasal drainage is noted.  Maxillary sinus tenderness to palpation.  NECK: no adenopathy,  no asymmetry, masses, or scars and thyroid normal to palpation  RESP: lungs clear to auscultation - no rales, rhonchi or wheezes  CV: regular rates and rhythm, normal S1 S2, no S3 or S4 and no murmur, click or rub -  SKIN: no suspicious lesions or rashes    assessment/plan:  (J01.90) Acute sinusitis with symptoms > 10 days  (primary encounter diagnosis)  Comment:   Plan: amoxicillin (AMOXIL) 875 MG tablet          Patient with 2-week history of URI related symptoms now with increasing facial pain and pressure consistent with sinusitis.  Continue with over-the-counter med as needed for symptomatic relief.  Amoxicillin as directed.  Increase fluids hot packs to the face along with steam.  Follow-up with primary if symptoms worsen or new symptoms develop.    (R05.1) Acute cough  Comment:   Plan: benzonatate (TESSALON) 200 MG capsule        Secondary to postnasal drainage.  Lungs are clear and there is no evidence for pneumonia.     Yes...

## 2025-01-17 NOTE — CONSULT NOTE ADULT - TIME BILLING
- Review of records, telemetry, vital signs and daily labs.   - General and cardiovascular physical examination.  - Generation of cardiovascular treatment plan and completion of note .  - Coordination of care.      Patient was seen and examined by me on  01/17/2025,interim events noted,labs and radiology studies reviewed.  Adolfo Bah MD,FACC.  [address] [place], [place] [id number].  [phone]  Availabe to call or text on [employer] TE[employer].

## 2025-01-17 NOTE — PATIENT PROFILE ADULT - NSPROIMPLANTSMEDDEV_GEN_A_NUR
Patient Summarization Differential

                             Created on: 2020



Bernardo Moulton

External Reference #: [id number]

: 1937

Sex: Male



Demographics





                          Address                   [address]



 

                          Home Phone                [phone]

 

                          Preferred Language        English

 

                          Marital Status            S

 

                          Ep[employer] Affiliation     Unknown

 

                          Race                      White

 

                          Ethnic Group              Not  or 





Author





                          Author                    [place].

 

                          Organization              [facility]

 

                          Address                   [address] [place] [address]  [address]



 

                          Phone                     [phone]







Care Team Providers





                    Care Team Member Name Role                Phone

 

                    Scooter, V S            Unavailable         Unavailable

 

                    GELLENDER, TOR A Unavailable         [phone]

 

                    Scooter, V S            Unavailable         Unavailable

 

                    Scooter, V S            Unavailable         Unavailable

 

                    Scooter, V S            Unavailable         Unavailable

 

                    SCOOTER, V S            Unavailable         [phone]

 

                    [facility] Unavailable         Unavaila

ble

 

                    GELLENDER, TOR A Unavailable         [phone]

 

                    GELLENDER, TOR A Unavailable         [phone]

 

                    Scooter, V S            Unavailable         Unavailable

 

                    Scooter, V              Unavailable         Unavailable

 

                    Scooter, V S            Unavailable         Unavailable



                                            



Allergies

                      



      



           Normalized  Allergy   Reported  Date of   Reaction(s)  Care Provider 

 Facility



                 Allergy Type    classification  allergen        Allergy Onset  

 

 

      



            Drug Allergy  acetylcarnitin  acetylcarnitin   no information  [facility]



                 (2 sources.)    [facility]



                                         ([id number])



                                                                              



Medications

                      The data below is from unstructured sourcesUnknown or Not 
Available.Unknown or Not Available.                                             
                      



Problems

                      The data below is from unstructured sourcesNo Known 
Problems or Medical conditions.No Known Problems or Medical conditions.No Known 
Problems or Medical conditions.                                                 
                  



Procedures

                      The data below is from unstructured sourcesNo known 
history of procedures.No known history of procedures.No known history of 
procedures.No known history of procedures.No known history of procedures.No kn
own history of procedures.No known history of procedures.                       
                                            



Immunizations

                      The data below is from unstructured sourcesNot 
available.No immunization records.No immunization records.No immunization re
cords.No immunization records.Not available.Not available.Not available.Not avai
lable.Not available.Not available.Not available.Not available.Unknown or Not Bety
ilable.Not available.Not available.Not available.Not available.                 
                                                  



Results

                      The data below is from unstructured sourcesNo known 
relevant diagnostic tests, laboratory data and/or discharge summary.No Known 
Relevant Diagnostic Tests, Laboratory Data and/or Discharge Summary.No Known 
Relevant Diagnostic Tests, Laboratory Data and/or Discharge Summary.            
                                                       



Vital Signs

                      The data below is from unstructured sources            



                    Vital                   Response                   Date/Time

                

 

                          Temperature (Fahrenheit)                   99.6 degree

s F (97.6 - 99.5)         

                                        2016 7:20pm                

 

                          Temperature (Calculated Celsius)                   37.

17207 degrees C (36.4 - 

37.5)                                   2016 7:20pm                

 

                    Temperature Source                   Temporal               

    2016 

7:20pm                

 

                    Pulse Rate (adult)                   97 bpm (60 - 90)       

            

2016 7:20pm                

 

                    Respiratory Rate                   18 bpm (12 - 24)         

          2016

7:20pm                

 

                    O2 Sat by Pulse Oximetry                   91 % (88 - 100)  

                 

2016 7:20pm                

 

                    Blood Pressure                   114/78 mm Hg               

    2016 

7:20pm                

 

                     Blood Pressure Mean                   90 mm Hg             

      2016 

7:20pm                

 

                    Pain                                                       

 

                     Numeric Pain Scale                   0-No Pain             

      2016 

7:20pm                

 

                    Height (Feet)                   5 feet                    7:20pm      

         

 

                    Height (Inches)                   8 inches                  

 2016 7:20pm  

             

 

                          Height (Calculated Centimeters)                   172.

191200 cm                 

                                        2016 7:20pm                

 

                    Weight (Pounds)                   184 pounds                

   2016 7:20pm

               

 

                    Weight (Ounces)                   0.0 oz                   0

2016 7:20pm    

           

 

                    Weight (Calculated Grams)                   02223.812 gm    

               

2016 7:20pm                

 

                          Weight (Calculated Kilograms)                   83.460

997 kilograms             

                                        2016 7:20pm                

 

                    Calculated BMI                   28.20                    7:20pm      

         

 

                    Capillary Refill                                            

           

 

                     Capillary Refill                   Less Than 3 Seconds     

              

2016 7:20pm                



            



                    Vital                   Response                   Date/Time

                

 

                          Temperature (Fahrenheit)                   99.1 degree

s F (97.6 - 99.5)         

                                        2016 9:31am                

 

                          Temperature (Calculated Celsius)                   37.

50379 degrees C (36.4 - 

37.5)                                   2016 9:31am                

 

                    Temperature Source                   Temporal               

    2016 

9:31am                

 

                    Pulse Rate (adult)                   55 bpm (60 - 90)       

            

2016 8:00am                

 

                    Respiratory Rate                   18 bpm (12 - 24)         

          2016

8:00am                

 

                    O2 Sat by Pulse Oximetry                   94 % (88 - 100)  

                 

2016 11:10am                

 

                    Blood Pressure                   108/74 mm Hg               

    2016 

8:00am                

 

                     Blood Pressure Mean                   85 mm Hg             

      2016 

8:00am                

 

                    Pain                                                       

 

                     Numeric Pain Scale                   0-No Pain             

      2016 

10:36am                

 

                     Pain Intensity                   AA                    9:31am        

       

 

                    Height (Feet)                   5 feet                   2016 8:01pm      

         

 

                    Height (Inches)                   9.00 inches               

    2016 

8:01pm                

 

                          Height (Calculated Centimeters)                   175.

445629 cm                 

                                        2016 8:01pm                

 

                    Weight (Pounds)                   197 pounds                

   2016 6:15am

               

 

                    Weight (Ounces)                   6.0 oz                   1

1/10/2016 6:00am    

           

 

                    Weight (Calculated Grams)                   28705.698 gm    

               

2016 6:15am                

 

                          Weight (Calculated Kilograms)                   89.357

698 kilograms             

                                        2016 6:15am                

 

                    Calculated BMI                   27.4                   2016 8:01pm       

        

 

                    Capillary Refill                                            

           

 

                     Capillary Refill                   Less Than 3 Seconds     

              

11/10/2016 12:57pm                



            



                    Vital                   Response                   Date/Time

                

 

                          Temperature (Fahrenheit)                   97.6 degree

s F (97.6 - 99.5)         

                                        2015 12:10pm                

 

                          Temperature (Calculated Celsius)                   36.

47152 degrees C (36.4 - 

37.5)                                   2015 9:51am                

 

                    Temperature Source                   Temporal               

    2015 

12:10pm                

 

                    Pulse Rate (adult)                   88 bpm (60 - 90)       

            

2015 12:10pm                

 

                    Respiratory Rate                   20 bpm (12 - 24)         

          2015

12:10pm                

 

                    O2 Sat by Pulse Oximetry                   93 % (88 - 100)  

                 

2015 12:10pm                

 

                    Blood Pressure                   106/59 mm Hg               

    2015 

12:10pm                

 

                     Blood Pressure Mean                   75 mm Hg             

      2015 

9:51am                

 

                    Pain                                                       

 

                     Pain Intensity                   0                   2015 12:00pm        

       

 

                    Height (Feet)                   5 feet                    9:00am      

         

 

                    Height (Inches)                   9.00 inches               

    2015 

9:00am                

 

                          Height (Calculated Centimeters)                   175.

931927 cm                 

                                        2015 9:00am                

 

                    Weight (Pounds)                   185 pounds                

   2015 6:00am

               

 

                    Weight (Ounces)                   9.0 oz                   1

2015 6:00am    

           

 

                    Weight (Calculated Grams)                   39323.735 gm    

               

2015 6:00am                

 

                          Weight (Calculated Kilograms)                   84.169

735 kilograms             

                                        2015 6:00am                

 

                    Calculated BMI                   28.65                    9:00am      

         



            



                    Vital                   Response                   Date/Time

                

 

                          Temperature (Fahrenheit)                   97.4 degree

s F (97.6 - 99.5)         

                                        2016 1:10pm                

 

                          Temperature (Calculated Celsius)                   36.

62780 degrees C (36.4 - 

37.5)                                   2016 1:10pm                

 

                    Pulse Rate (adult)                   74 bpm (60 - 90)       

            

2016 4:21pm                

 

                    Respiratory Rate                   12 bpm (12 - 24)         

          2016

4:21pm                

 

                    O2 Sat by Pulse Oximetry                   98 % (88 - 100)  

                 

2016 4:21pm                

 

                    Blood Pressure                   11/63 mm Hg                

   2016 4:21pm

               

 

                     Blood Pressure Mean                   86 mm Hg             

      2016 

1:10pm                

 

                    Pain                                                       

 

                     Numeric Pain Scale                   0-No Pain             

      2016 

4:21pm                

 

                    Height (Feet)                   5 feet                    1:10pm      

         

 

                    Height (Inches)                   9 inches                  

 2016 1:10pm  

             

 

                          Height (Calculated Centimeters)                   175.

308744 cm                 

                                        2016 1:10pm                

 

                    Weight (Pounds)                   182 pounds                

   2016 1:10pm

               

 

                          Weight (Calculated Kilograms)                   82.553

812 kilograms             

                                        2016 1:10pm                

 

                    Height                   5 ft 9 in                          

         

 

                    Weight                   182 lb                             

      

 

                    Body Mass Index                   26.9 kg/m^2               

                    



            



                    Vital                   Response                   Date/Time

                

 

                    Height                   5 ft 9 in                          

         

 

                    Weight                   200 lb                             

      

 

                    Body Mass Index                   29.5 kg/m^2               

                    



            



                    Vital                   Response                   Date/Time

                

 

                          Temperature (Fahrenheit)                   97.8 degree

s F (97.6 - 99.5)         

                                        2015 10:22pm                

 

                          Temperature (Calculated Celsius)                   36.

64927 degrees C (36.4 - 

37.5)                                   2015 10:22pm                

 

                    Temperature Source                   Temporal               

    2015 

10:22pm                

 

                    Pulse Rate (adult)                   104 bpm (60 - 90)      

             

2015 10:22pm                

 

                    Respiratory Rate                   20 bpm (12 - 24)         

          2015

10:22pm                

 

                    O2 Sat by Pulse Oximetry                   92 % (88 - 100)  

                 

2015 8:08pm                

 

                    Blood Pressure                   125/66 mm Hg               

    2015 

10:22pm                

 

                     Blood Pressure Mean                   88 mm Hg             

      2015 

8:08pm                

 

                    Pain                                                       

 

                     Pain Intensity                   0                   2015 10:22pm        

       

 

                    Height (Feet)                   5 feet                    10:22pm     

          

 

                    Height (Inches)                   9.00 inches               

    2015 

10:22pm                

 

                          Height (Calculated Centimeters)                   175.

263072 cm                 

                                        2015 10:22pm                

 

                    Weight (Pounds)                   200 pounds                

   2015 

10:22pm                

 

                    Weight (Ounces)                   0.0 oz                   1

2015 10:22pm   

            

 

                    Weight (Calculated Grams)                   03182.475 gm    

               

2015 10:22pm                

 

                          Weight (Calculated Kilograms)                   90.718

475 kilograms             

                                        2015 10:22pm                

 

                    Calculated BMI                   28.20                   2015 7:39am      

         



            



                    Vital                   Response                   Date/Time

                

 

                          Temperature (Fahrenheit)                   98.2 degree

s F (97.6 - 99.5)         

                                        2015 5:20am                

 

                          Temperature (Calculated Celsius)                   36.

71333 degrees C (36.4 - 

37.5)                                   2015 5:20am                

 

                    Temperature Source                   Temporal               

    2015 

5:20am                

 

                    Pulse Rate (adult)                   53 bpm (60 - 90)       

            

2015 5:20am                

 

                    Respiratory Rate                   22 bpm (12 - 24)         

          2015

5:20am                

 

                    O2 Sat by Pulse Oximetry                   93 % (88 - 100)  

                 

2015 8:02am                

 

                    Blood Pressure                   105/57 mm Hg               

    2015 

5:20am                

 

                     Blood Pressure Mean                   73 mm Hg             

      2015 

5:20am                

 

                    Pain                                                       

 

                     Pain Intensity                   0                   2015 5:20am         

      

 

                    Height (Feet)                   5 feet                    5:33pm      

         

 

                    Height (Inches)                   9.00 inches               

    2015 

5:33pm                

 

                          Height (Calculated Centimeters)                   175.

926097 cm                 

                                        2015 5:33pm                

 

                    Weight (Pounds)                   193 pounds                

   2015 5:57am

               

 

                    Weight (Ounces)                   2.0 oz                   1

2015 5:57am    

           

 

                    Weight (Calculated Grams)                   50765.027 gm    

               

2015 5:57am                

 

                          Weight (Calculated Kilograms)                   87.600

027 kilograms             

                                        2015 5:57am                

 

                    Calculated BMI                   28.94                    5:33pm      

         



            



                    Vital                   Response                   Date/Time

                

 

                          Temperature (Fahrenheit)                   98.6 degree

s F (97.6 - 99.5)         

                                        2015 2:23pm                

 

                          Temperature (Calculated Celsius)                   37.

15078 degrees C (36.4 - 

37.5)                                   2015 2:23pm                

 

                    Temperature Source                   Temporal               

    2015 

2:23pm                

 

                    Pulse Rate (adult)                   68 bpm (60 - 90)       

            

2015 2:23pm                

 

                    Respiratory Rate                   20 bpm (12 - 24)         

          2015

2:23pm                

 

                    O2 Sat by Pulse Oximetry                   93 % (88 - 100)  

                 

2015 2:55pm                

 

                    Blood Pressure                   97/61 mm Hg                

   2015 2:23pm

               

 

                     Blood Pressure Mean                   73 mm Hg             

      2015 

2:23pm                

 

                    Pain                                                       

 

                     Pain Intensity                   0                   2015 4:00pm         

      

 

                    Height (Feet)                   5 feet                    6:08pm      

         

 

                    Height (Inches)                   9.00 inches               

    2015 

6:08pm                

 

                          Height (Calculated Centimeters)                   175.

306007 cm                 

                                        2015 6:08pm                

 

                    Weight (Pounds)                   191 pounds                

   2015 6:48am

               

 

                    Weight (Ounces)                   4.0 oz                   1

2015 6:08pm    

           

 

                    Weight (Calculated Grams)                   92065.144 gm    

               

2015 6:48am                

 

                          Weight (Calculated Kilograms)                   86.636

144 kilograms             

                                        2015 6:48am                

 

                    Calculated BMI                   27.91                    6:08pm      

         



            



                    Vital                   Response                   Date/Time

                

 

                          Temperature (Fahrenheit)                   97.3 degree

s F (97.6 - 99.5)         

                                        10/19/2015 11:24am                

 

                          Temperature (Calculated Celsius)                   36.

50134 degrees C (36.4 - 

37.5)                                   10/19/2015 6:00am                

 

                    Temperature Source                   Tympanic               

    10/19/2015 

11:24am                

 

                    Pulse Rate (adult)                   64 bpm (60 - 90)       

            

10/19/2015 11:24am                

 

                    Respiratory Rate                   17 bpm (12 - 24)         

          10/19/2015

11:24am                

 

                    O2 Sat by Pulse Oximetry                   96 % (88 - 100)  

                 

10/19/2015 11:24am                

 

                    Blood Pressure                   103/75 mm Hg               

    10/19/2015 

11:24am                

 

                     Blood Pressure Mean                   84 mm Hg             

      10/19/2015 

6:00am                

 

                    Pain                                                       

 

                     Pain Intensity                   0                   10/19/

2015 6:00am         

      

 

                    Height (Feet)                   5 feet                   10/

2015 9:00am      

         

 

                    Height (Inches)                   9.00 inches               

    10/09/2015 

9:00am                

 

                          Height (Calculated Centimeters)                   175.

779915 cm                 

                                        10/09/2015 9:00am                

 

                    Weight (Pounds)                   196 pounds                

   10/09/2015 9:00am

               

 

                    Weight (Ounces)                   8.0 oz                   1

 9:00am    

           

 

                    Weight (Calculated Grams)                   90495.902 gm    

               

10/09/2015 9:00am                

 

                          Weight (Calculated Kilograms)                   89.130

902 kilograms             

                                        10/09/2015 9:00am                

 

                    Calculated BMI                   28.94                   10/

2015 9:00am      

         



            



                    Vital                   Response                   Date/Time

                

 

                          Temperature (Fahrenheit)                   97.9 degree

s F (97.6 - 99.5)         

                                                        

 

                          Temperature (Calculated Celsius)                   36.

89864 degrees C (36.4 - 

37.5)                                                   

 

                    Temperature Source                   Skin                   

                 

 

                    Pulse Rate (adult)                   79 bpm (60 - 90)       

                    

       

 

                    Respiratory Rate                   18 bpm (12 - 24)         

                    

     

 

                    O2 Sat by Pulse Oximetry                   92 % (88 - 100)  

                    

            

 

                    Blood Pressure                   96/60 mm Hg                

                   

 

                    Pain                                                       

 

                     Pain Intensity                   0                         

           

 

                    Height (Feet)                   5 feet                      

              

 

                    Height (Inches)                   10.00 inches              

                    

 

 

                          Height (Calculated Centimeters)                   177.

509521 cm                 

                                                        

 

                    Weight (Pounds)                   210 pounds                

                    

 

                    Weight (Calculated Grams)                   74785.399 gm    

                    

           

 

                          Weight (Calculated Kilograms)                   95.254

399 kilograms             

                                                        

 

                    Calculated BMI                   30.13                      

              



            



                    Vital                   Response                   Date/Time

                

 

                          Temperature (Fahrenheit)                   98.4 degree

s F (97.6 - 99.5)         

                                                        

 

                          Temperature (Calculated Celsius)                   36.

74583 degrees C (36.4 - 

37.5)                                                   

 

                    Temperature Source                   Temporal               

                    



 

                    Pulse Rate (adult)                   61 bpm (60 - 90)       

                    

       

 

                    Respiratory Rate                   20 bpm (12 - 24)         

                    

     

 

                    O2 Sat by Pulse Oximetry                   92 % (88 - 100)  

                    

            

 

                    Blood Pressure                   110/54 mm Hg               

                    

 

                    Pain                                                       

 

                     Pain Intensity                   0                         

           

 

                    Height (Feet)                   5 feet                      

              

 

                    Height (Inches)                   9.00 inches               

                    



 

                          Height (Calculated Centimeters)                   175.

128294 cm                 

                                                        

 

                    Weight (Pounds)                   202 pounds                

                    

 

                    Weight (Ounces)                   4.0 oz                    

                

 

                    Weight (Calculated Grams)                   72020.058 gm    

                    

           

 

                          Weight (Calculated Kilograms)                   91.739

058 kilograms             

                                                        

 

                    Calculated BMI                   29.83                      

              



            



                    Vital                   Response                   Date/Time

                

 

                          Temperature (Fahrenheit)                   98.1 degree

s F (97.6 - 99.5)         

                                                        

 

                          Temperature (Calculated Celsius)                   36.

10611 degrees C (36.4 - 

37.5)                                                   

 

                    Temperature Source                   Temporal               

                    



 

                    Pulse Rate (adult)                   48 bpm (60 - 90)       

                    

       

 

                    Respiratory Rate                   20 bpm (12 - 24)         

                    

     

 

                    O2 Sat by Pulse Oximetry                   98 % (88 - 100)  

                    

            

 

                    Blood Pressure                   122/57 mm Hg               

                    

 

                    Pain                                                       

 

                     Pain Intensity                   0                         

           

 

                    Height (Feet)                   5 feet                      

              

 

                    Height (Inches)                   9 inches                  

                  

 

                          Height (Calculated Centimeters)                   175.

085949 cm                 

                                                        

 

                    Weight (Pounds)                   201 pounds                

                    

 

                          Weight (Calculated Kilograms)                   91.172

067 kilograms             

                                                        

 

                    Calculated BMI                   29.68                      

              



            



                    Vital                   Response                   Date/Time

                

 

                          Temperature (Fahrenheit)                   97.8 degree

s F (97.6 - 99.5)         

                                                        

 

                          Temperature (Calculated Celsius)                   36.

17167 degrees C (36.4 - 

37.5)                                                   

 

                    Temperature Source                   Tympanic               

                    



 

                    Pulse Rate (adult)                   74 bpm (60 - 90)       

                    

       

 

                    Respiratory Rate                   18 bpm (12 - 24)         

                    

     

 

                    O2 Sat by Pulse Oximetry                   94 % (88 - 100)  

                    

            

 

                    Blood Pressure                   112/62 mm Hg               

                    

 

                    Pain                                                       

 

                     Pain Intensity                   0                         

           

 

                    Height (Feet)                   5 feet                      

              

 

                    Height (Inches)                   9.00 inches               

                    



 

                          Height (Calculated Centimeters)                   175.

547566 cm                 

                                                        

 

                    Weight (Pounds)                   205 pounds                

                    

 

                    Weight (Ounces)                   0.0 oz                    

                

 

                    Weight (Calculated Grams)                   32139.437 gm    

                    

           

 

                          Weight (Calculated Kilograms)                   92.986

437 kilograms             

                                                        

 

                    Calculated BMI                   30.27                      

              



            



                    Vital                   Response                   Date/Time

                

 

                          Temperature (Fahrenheit)                   97.8 degree

s F (97.6 - 99.5)         

                                        2015 5:22am                

 

                          Temperature (Calculated Celsius)                   36.

84420 degrees C (36.4 - 

37.5)                                   2015 5:22am                

 

                    Temperature Source                   Temporal               

    2015 

5:22am                

 

                    Pulse Rate (adult)                   65 bpm (60 - 90)       

            

2015 5:22am                

 

                    Respiratory Rate                   18 bpm (12 - 24)         

          2015

5:22am                

 

                    O2 Sat by Pulse Oximetry                   91 % (88 - 100)  

                 

2015 10:36am                

 

                    Blood Pressure                   104/64 mm Hg               

    2015 

5:22am                

 

                     Blood Pressure Mean                   77 mm Hg             

      2015 

5:22am                

 

                    Pain                                                       

 

                     Pain Intensity                   0                   2015 8:00am         

      

 

                    Height (Feet)                   5 feet                   2015 7:50pm      

         

 

                    Height (Inches)                   9.00 inches               

    2015 

7:50pm                

 

                          Height (Calculated Centimeters)                   175.

945116 cm                 

                                        2015 7:50pm                

 

                    Weight (Pounds)                   197 pounds                

   2015 9:27pm

               

 

                    Weight (Ounces)                   5.0 oz                   0

2015 9:27pm    

           

 

                    Weight (Calculated Grams)                   54803.445 gm    

               

2015 9:27pm                

 

                          Weight (Calculated Kilograms)                   89.499

445 kilograms             

                                        2015 9:27pm                

 

                    Calculated BMI                   29.09                   2015 7:50pm      

         



            



                    Vital                   Response                   Date/Time

                

 

                          Temperature (Fahrenheit)                   97.6 degree

s F (97.6 - 99.5)         

                                                        

 

                          Temperature (Calculated Celsius)                   36.

84619 degrees C (36.4 - 

37.5)                                                   

 

                    Temperature Source                   Temporal               

                    



 

                    Pulse Rate (adult)                   67 bpm (60 - 90)       

                    

       

 

                    Respiratory Rate                   20 bpm (12 - 24)         

                    

     

 

                    O2 Sat by Pulse Oximetry                   93 % (88 - 100)  

                    

            

 

                    Blood Pressure                   108/66 mm Hg               

                    

 

                    Pain                                                       

 

                     Pain Intensity                   0                         

           

 

                    Height (Feet)                   5 feet                      

              

 

                    Height (Inches)                   9.00 inches               

                    



 

                          Height (Calculated Centimeters)                   175.

188840 cm                 

                                                        

 

                    Weight (Pounds)                   198 pounds                

                    

 

                    Weight (Ounces)                   3.0 oz                    

                

 

                    Weight (Calculated Grams)                   14288.339 gm    

                    

           

 

                          Weight (Calculated Kilograms)                   89.896

339 kilograms             

                                                        

 

                    Calculated BMI                   29.24                      

              



            



                    Vital                   Response                   Date/Time

                

 

                          Temperature (Fahrenheit)                   100.7 degre

es F (97.6 - 99.5)        

                                                        

 

                          Temperature (Calculated Celsius)                   38.

44248 degrees C (36.4 - 

37.5)                                                   

 

                    Temperature Source                   Temporal               

                    



 

                    Pulse Rate (adult)                   65 bpm (60 - 90)       

                    

       

 

                    Respiratory Rate                   28 bpm (12 - 24)         

                    

     

 

                    O2 Sat by Pulse Oximetry                   94 % (88 - 100)  

                    

            

 

                    Blood Pressure                   117/73 mm Hg               

                    

 

                    Pain                                                       

 

                     Pain Intensity                   0                         

           

 

                    Height (Feet)                   5 feet                      

              

 

                    Height (Inches)                   9 inches                  

                  

 

                          Height (Calculated Centimeters)                   175.

836177 cm                 

                                                        

 

                    Weight (Pounds)                   200 pounds                

                    

 

                          Weight (Calculated Kilograms)                   90.718

475 kilograms             

                                                        

 

                    Calculated BMI                   29.53                      

              



            



                    Vital                   Response                   Date/Time

                

 

                          Temperature (Fahrenheit)                   98.2 degree

s F (97.6 - 99.5)         

                                        2015 6:39am                

 

                          Temperature (Calculated Celsius)                   36.

01024 degrees C (36.4 - 

37.5)                                   2015 6:39am                

 

                    Temperature Source                   Temporal               

    2015 

6:39am                

 

                    Pulse Rate (adult)                   92 bpm (60 - 90)       

            

2015 6:39am                

 

                    Respiratory Rate                   22 bpm (12 - 24)         

          2015

6:39am                

 

                    O2 Sat by Pulse Oximetry                   94 % (88 - 100)  

                 

2015 6:39am                

 

                    Blood Pressure                   115/73 mm Hg               

    2015 

6:39am                

 

                     Blood Pressure Mean                   87 mm Hg             

      2015 

6:05am                

 

                    Pain                                                       

 

                     Pain Intensity                   0                   2015 6:39am         

      

 

                    Height (Feet)                   5 feet                    6:39am      

         

 

                    Height (Inches)                   9.00 inches               

    2015 

6:39am                

 

                          Height (Calculated Centimeters)                   175.

116295 cm                 

                                        2015 6:39am                

 

                    Weight (Pounds)                   205 pounds                

   2015 6:39am

               

 

                    Weight (Ounces)                   0.0 oz                   0

2015 6:39am    

           

 

                    Weight (Calculated Grams)                   74873.437 gm    

               

2015 6:39am                

 

                          Weight (Calculated Kilograms)                   92.986

437 kilograms             

                                        2015 6:39am                

 

                    Calculated BMI                   29.09                   2015 7:50pm      

         



            



                    Vital                   Response                   Date/Time

                

 

                          Temperature (Fahrenheit)                   98.0 degree

s F (97.6 - 99.5)         

                                        10/09/2015 5:50am                

 

                          Temperature (Calculated Celsius)                   36.

13587 degrees C (36.4 - 

37.5)                                   10/09/2015 5:50am                

 

                    Temperature Source                   Tympanic               

    10/09/2015 

5:50am                

 

                    Pulse Rate (adult)                   71 bpm (60 - 90)       

            

10/09/2015 5:50am                

 

                    Respiratory Rate                   18 bpm (12 - 24)         

          10/09/2015

5:50am                

 

                    O2 Sat by Pulse Oximetry                   96 % (88 - 100)  

                 

10/09/2015 7:50am                

 

                    Blood Pressure                   102/60 mm Hg               

    10/09/2015 

5:50am                

 

                     Blood Pressure Mean                   74 mm Hg             

      10/09/2015 

5:50am                

 

                    Pain                                                       

 

                     Pain Intensity                   0                   10/09/

2015 5:50am         

      

 

                    Height (Feet)                   5 feet                   10/

2015 2:06pm      

         

 

                    Height (Inches)                   9.00 inches               

    10/01/2015 

2:06pm                

 

                          Height (Calculated Centimeters)                   175.

637385 cm                 

                                        10/01/2015 2:06pm                

 

                    Weight (Pounds)                   190 pounds                

   10/08/2015 9:00pm

               

 

                    Weight (Ounces)                   8.0 oz                   1

 9:00pm    

           

 

                    Weight (Calculated Grams)                   28034.347 gm    

               

10/08/2015 9:00pm                

 

                          Weight (Calculated Kilograms)                   86.409

347 kilograms             

                                        10/08/2015 9:00pm                

 

                    Calculated BMI                   28.50                   10/

2015 2:06pm      

         



            



                    Vital                   Response                   Date/Time

                

 

                          Temperature (Fahrenheit)                   99.4 degree

s F (97.6 - 99.5)         

                                        2015 5:29am                

 

                          Temperature (Calculated Celsius)                   37.

38289 degrees C (36.4 - 

37.5)                                   2015 5:29am                

 

                    Temperature Source                   Temporal               

    2015 

5:29am                

 

                    Pulse Rate (adult)                   71 bpm (60 - 90)       

            

2015 5:29am                

 

                    Respiratory Rate                   18 bpm (12 - 24)         

          2015

5:29am                

 

                    O2 Sat by Pulse Oximetry                   98 % (88 - 100)  

                 

2015 10:30am                

 

                    Blood Pressure                   104/61 mm Hg               

    2015 

5:29am                

 

                     Blood Pressure Mean                   75 mm Hg             

      2015 

5:29am                

 

                    Pain                                                       

 

                     Pain Intensity                   0                   2015 12:00pm        

       

 

                    Height (Feet)                   5 feet                    1:30pm      

         

 

                    Height (Inches)                   9.00 inches               

    2015 

1:30pm                

 

                          Height (Calculated Centimeters)                   175.

814446 cm                 

                                        2015 1:30pm                

 

                    Weight (Pounds)                   200 pounds                

   2015 

11:17pm                

 

                    Weight (Ounces)                   3.0 oz                   0

2015 11:17pm   

            

 

                    Weight (Calculated Grams)                   62584.523 gm    

               

2015 11:17pm                

 

                          Weight (Calculated Kilograms)                   90.803

523 kilograms             

                                        2015 11:17pm                

 

                    Calculated BMI                   29.53                    1:30pm      

         



                                                                    



Interventions

          No Information                                                        
 



Plan of Treatment

                      The data below is from unstructured sources            



                          Discharge Date                   16 10:00pm     

           

 

                          Disposition                   01 HOME, SELF-CARE      

          

 

                          Condition at Discharge                   Stable       

         

 

                          Instructions/Education Provided                   Feve

r in Adults (ED)          

     

 

                          Prescriptions                   See Medication Section

                

 

                          Referrals                   TOR CONNELLY

[facility] Physician     

          



            



                          Discharge Date                   16 11:55am     

           

 

                          Disposition                   01 HOME, SELF-CARE      

          

 

                          Instructions/Education Provided                   Cefe

pime                

 

                          Prescriptions                   See Medication Section

                

 

                          Follow-up Orders                   Other Outpatient Or

[facility]                

 

                          Referrals                   TOR CONNELLY DO (Un

specified) - 11/15/16     

               

Address:                    

                                        [address] [place] [address]                    

                                        [phone]                    

                    

Reason(s) for Referral:                    

APPOINTMENT AT 11 AM                    

                    

                    

SHAHRIAR MEYER MD (Unspecified) - 2 Weeks                    

Address:                    

                                        [address] [place] [address]                    

                                        [phone]                    

                    

Reason(s) for Referral:                    

MILLIE WILL CALL YOU NEXT WEEK TO MAKE APPOINTMENT                              
   

 

                          Care Plan and Goals                   See Discharge In

structions Section        

       



            



                          Discharge Date                   11/25/15 12:15pm     

           

 

                          Disposition                   01 HOME, SELF-CARE      

          

 

                          Instructions/Education Provided                   Bact

erial Pneumonia (DC)      

         

 

                          Prescriptions                   See Medication Section

                

 

                          Referrals                   DR. CONNELLY (Unspecified

) -                     

                    

Reason(s) for Referral:                    

FOLLOW UP WITH DR. CONNELLY                    

 AT 1:00PM                    

                                        [phone]                                

  

 

                          Care Plan and Goals                   See Discharge In

structions Section        

       



            



                          Discharge Date                   16 4:25pm      

          

 

                          Disposition                   01 HOME, SELF-CARE      

          

 

                          Condition at Discharge                   Improved     

           

 

                          Instructions/Education Provided                   Diab

etic Hypoglycemia (GEN)   

            

 

                          Prescriptions                   See Medication Section

                

 

                          Referrals                   TOR CONNELLY

[facility] Physician     

          

 

                          Additional Instructions/Education                   1.

 Check your blood sugar 3 

times per day                     

                                        2. Follow-up with your doctor later this

 week to adjust insulin levels as       

             

needed                     

                                        3. Return to ER as needed               

      

All discharge instructions reviewed with patient and/or family. Voiced          
          

understanding.                                  



            



                          Instructions/Education Provided                   PICC

-POST REMOVAL CARE        

       

 

                          Prescriptions                   See Medication Section

                



            



                          Instructions/Education Provided                   PICC

-POST REMOVAL CARE        

       

 

                          Prescriptions                                    



            



                          Discharge Date                   11/18/15 8:23am      

          

 

                          Disposition                   [address] [facility] BED   

             

 

                          Instructions/Education Provided                   DISC

HARGE                

 

                          Prescriptions                   See Medication Section

                



            



                          Discharge Date                   12/03/15 7:25pm      

          

 

                          Disposition                   01 HOME, SELF-CARE      

          

 

                          Instructions/Education Provided                   

magdiel Obstructive Pulmonary 

Disease (DC)                

 

                          Prescriptions                   See Medication Section

                

 

                          Referrals                   TOR CONNELLY DO (Un

specified) - 12/08/15     

               

Address:                    

                                        [address]                    

[place] [address]                    

                                        [phone]                    

                    

Reason(s) for Referral:                    

 AT 10:45 A.M.                                  

 

                          Additional Instructions/Education                   ZY

VOX 600MG BID IV 

ANTIBIOTICS FOR TOTAL OF TWO WEEKS INCLUDING INPATIENT INFUSIONS STARTED        
            

                                        2015 AT 2100                      

            



            



                          Discharge Date                   10/19/15 11:24am     

           

 

                          Disposition                   01 HOME, SELF-CARE      

          

 

                          Instructions/Education Provided                   Bact

erial Pneumonia (DC)      

         

 

                          Forms Provided                   [facility]          

      

 

                          Prescriptions                   See Medication Section

                

 

                          Referrals                    (Unspecified) -          

           

                    

Reason(s) for Referral:                    

APPOINTMENT DR. CONNELLY 10/23/15 @ 9:30                    

PHONE: [phone]                    

                    

                    

 (Unspecified) -                     

                    

Reason(s) for Referral:                    

APPOINTMENT DR. MUNOZ 2015 @ 10:00                    

PHONE: [phone]                    

                    

                    

 (Unspecified) -                     

                    

Reason(s) for Referral:                    

APPOINTMENT DR. MEYER 11/3/2015 10:10                    

PHONE [phone]                                  

 

                          Additional Instructions/Education                   [facility]

[employer] IF ANY PROBLEMS. 

 ADVAIR SAMPLES AT DR. REYES OFFICE.  SAMPLES OF             
       

XARELTO AT DR. MARSHALL OFFICE. FILL OUT FORMS FROM  FOR DISCOUNTED 
XARELTO AS                     

INSTRUCTED.                                  



            



                          Discharge Date                   14 1:01pm      

          

 

                          Disposition                   30 STILL A PATIENT      

          

 

                          Instructions/Education Provided                   Urin

mireya Tract Infection in Men

(DC)                

 

                          Forms Provided                   Follow-Up Appts.     

               

[facility]                                  

 

                          Prescriptions                   See Medications Sectio

n                



            



                          Discharge Date                   14 1:01pm      

          

 

                          Condition at Discharge                   Stable       

         

 

                          Instructions/Education Provided                   Urin

miryea Tract Infection in Men

(DC)                

 

                          Prescriptions                   See Medications Sectio

n                



            



                          Discharge Date                   10/07/14 3:26pm      

          

 

                          Disposition                   30 STILL A PATIENT      

          

 

                          Instructions/Education Provided                   Bact

erial Pneumonia (GEN)     

          

 

                          Forms Provided                   [facility]          

      

 

                          Prescriptions                   See Medications Sectio

n                



            



                          Discharge Date                   09/09/15 12:10pm     

           

 

                          Disposition                   30 STILL A PATIENT      

          

 

                          Instructions/Education Provided                   Comm

unity-acquired Pneumonia 

(DC)                

 

                          Prescriptions                   See Medication Section

                

 

                          Additional Instructions/Education                   Fo

llow up with Dr. Connelly

 at 11:00                



            



                          Discharge Date                   12/15/14 11:40am     

           

 

                          Disposition                   30 STILL A PATIENT      

          

 

                          Instructions/Education Provided                   Urin

miryea Tract Infection in Men

(DC)                

 

                          Forms Provided                   [facility]          

      

 

                          Prescriptions                   See Medications Sectio

n                

 

                          Referrals                    (Unspecified)            

         

                    

Reason(s) for Referral:                    

Follow up appt with Dr Meyer on 14 at 9:30, [phone]                  
 

                    

                    

TOR CONNELLY DO (Unspecified) 14                     

Address:                    

                                        [address] [place] [address]                    

                                        [phone]                    

                    

Reason(s) for Referral:                    

dec. 23 at 9 am.                                  



            



                          Prescriptions                   See Medication Section

                



            



                          Discharge Date                   10/09/15 8:24am      

          

 

                          Disposition                   09 ADMITTED AS INPATIENT

                

 

                          Instructions/Education Provided                   DISC

HARGE                

 

                          Prescriptions                   See Medication Section

                



            



                          Discharge Date                   08/19/15 1:57pm      

          

 

                          Disposition                   30 STILL A PATIENT      

          

 

                          Instructions/Education Provided                   Bact

erial Pneumonia (DC)      

         

 

                          Prescriptions                   See Medication Section

                

 

                          Referrals                   TOR CONNELLY DO (Un

specified) - 08/24/15     

               

Address:                    

                                        [address] [place] [address]                    

                                        [phone]                    

                    

Reason(s) for Referral:                    

FOLLOW UP WITH DR CONNELLY ON 8/24/15 AT 10:45.                                
 

 

                          Care Plan and Goals                   See Discharge In

structions Section        

       



Not available.                                                                  
 



Goals

          No Information                                                        
 



Social History

                      The data below is from unstructured sources            



                    History                   Response                   Recorde

d Date/Time         

      

 

                    Hx Family Cancer                   N                    2:35pm          

     

 

                    Hx Family Breast Cancer                   N                 

  13 12:28am  

             

 

                    Hx Family Lung Cancer                   N                   

13 12:28am    

           

 

                    Hx Family Colorectal Cancer                   N             

      13 

12:28am                

 

                    Hx Family Cardiac Disorders                   N             

      13 

2:35pm                

 

                    Hx Family Cystic Fibrosis                   N               

    13 12:28am

               



            



                    History                   Response                   Recorde

d Date/Time         

      

 

                    Alcohol Use                   Denies Use                   0

13 2:35pm      

         

 

                    Recreational Drug Use                   N                   

13 2:35pm     

          



            



                    History                   Response                   Recorde

d Date/Time         

      

 

                    Hx Family Cancer                   N                    12:28am         

      

 

                    Hx Family Breast Cancer                   N                 

  13 12:28am  

             

 

                    Hx Family Lung Cancer                   N                   

13 12:28am    

           

 

                    Hx Family Colorectal Cancer                   N             

      13 

12:28am                

 

                    Hx Family Cardiac Disorders                   N             

      13 

12:28am                

 

                    Hx Family Cystic Fibrosis                   N               

    13 12:28am

               



            



                    History                   Response                   Recorde

d Date/Time         

      

 

                    Alcohol Use                   Denies Use                   0

13 12:28am     

          

 

                    Recreational Drug Use                   N                   

13 12:28am    

           



                                                                    



Functional Status

                      The data below is from unstructured sources            



                    Query                   Response                   Date Tej

rded                

 

                          Patient Orientation                   Normal For Age  

                  

                                        2016 10:36am               

 

 

                          Patient Orientation                   Person          

          

Place                    

Time                    

Situation                    

                                        2016 1:04pm                

 

                          Comprehension Ability                   Understands Co

ncepts                    

                                        2016 8:00am                



            



                    Query                   Response                   Date Tej

rded                

 

                          Patient Orientation                   Person          

          

Place                    

Time                    

Situation                    

Normal For Age                    

                                        2015 1:39pm                

 

                          Comprehension Ability                   Understands Co

ncepts                    

                                        2015 8:00am                



            



                    Query                   Response                   Date Tej

rded                

 

                          Patient Orientation                   Person          

          

                                        2015 8:24am                

 

                          Patient Orientation                   Person          

          

Place                    

Time                    

Situation                    

                                        2015 8:24am                

 

                          Comprehension Ability                   Understands Co

ncepts                    

                                        2015 9:00pm                



            



                    Query                   Response                   Date Tej

rded                

 

                          Patient Orientation                   Person          

          

Place                    

Time                    

Situation                    

Normal For Age                    

                                        December 3, 2015 9:40am                

 

                          Patient Orientation                   Person          

          

Place                    

Time                    

Situation                    

                                        December 3, 2015 9:12pm                

 

                          Comprehension Ability                   Understands Co

ncepts                    

                                        December 3, 2015 9:00am                



            



                    Query                   Response                   Date Tej

rded                

 

                          Patient Orientation                   Person          

          

Place                    

Time                    

Situation                    

                                        2015 10:13am                

 

                          Patient Orientation                   Person          

          

Place                    

Time                    

Situation                    

Normal For Age                    

                                        2015 12:32pm                

 

                          Comprehension Ability                   Understands Co

ncepts                    

                                        2015 8:00am                



            



                    Query                   Response                   Date Tej

rded                

 

                          Patient Orientation                   Person          

          

Place                    

Time                    

Situation                    

                                        2014 5:05pm                



            



                    Query                   Response                   Date Tej

rded                

 

                          Comprehension Ability                   Understands Co

ncepts                    

                                        2014 8:00am                



            



                    Query                   Response                   Date Tej

rded                

 

                          Patient Orientation                   Person          

          

Place                    

Time                    

Situation                    

                                        2014 6:15pm                

 

                          Comprehension Ability                   Understands Co

ncepts                    

                                        2014 8:15pm                



            



                    Query                   Response                   Date Tej

rded                

 

                          Patient Orientation                   Person          

          

Place                    

Time                    

Situation                    

                                        2015 9:57am                

 

                          Patient Orientation                   Person          

          

Place                    

Time                    

Situation                    

Eyes Open                    

Normal For Age                    

                                        2015 1:20pm                

 

                          Comprehension Ability                   Understands Co

ncepts                    

                                        2015 8:00am                



            



                    Query                   Response                   Date Tej

rded                

 

                          Comprehension Ability                   Understands Co

ncepts                    

                                        2014 8:00am                



            



                    Query                   Response                   Date Tej

rded                

 

                          Patient Orientation                   Person          

          

Place                    

Time                    

Situation                    

Eyes Open                    

                                        2015 8:24am                



            



                    Query                   Response                   Date Tej

rded                

 

                          Patient Orientation                   Person          

          

Place                    

Time                    

Situation                    

                                        2015 5:49pm                

 

                          Comprehension Ability                   Understands Co

ncepts                    

                                        2015 8:00pm                



                                                                    



Mental Status

          No Information                                                        
 



Encounters

                      



    



              Encounter    Normalized Encounter  Encounter Diagnosis  Care Provi

[facility]  

Organization



                           Date                      Type   

 

    



              2018   Patient encounter  no information  no name (no phone)

  no 

organization name



                           -                         (no phone)



                                         2018    

 

    



                 Patient encounter  no information  no name (no phone)  no organ

ization name



                                         (no phone)



                                                                                
       



Medical Equipment

          No Information                                                        
 



Payers

                      The data below is from unstructured sources            



                    Payer Name                   Policy Number                  

 Subscriber Name    

                                        Relationship                

 

                    [employer]                   [id number]          

         

Bernardo Moulton                         Self / Same As Patient               

 

 

                    [employer]                   [id number]                   

Bernardo Moulton    

                                         Self / Same As Patient               

 



                                                                    



History of Past Illness

                      



                    Name                   Date of Onset                   Comme

nts                

 

                    Benign hypertrophy of prostate                   2015 

                    

              

 

                    Recurrent UTI                                               

         

 

                          Post-traumatic bulbous urethral stricture             

      2016          

                                                         

 

                                        Benign non-nodular prostatic hyperplasia

 without lower urinary tract symptoms   

                          2016                                    

 

                    Bacterial UTI                   2016                  

                  

 

                    Stricture of male urethra                   2016      

                    

         

 

                          Adenofibromatous hypertrophy of prostate              

     2016           

                                                         

 

                          Benign hypertrophy of prostate                   2015 9:56AM              

                                                         

 

                          Benign Hypertrophy of Prostate (BPH) Stable           

        Paulo 3 2015 11:26AM

                                                         

 

                          BPH (benign prostatic hypertrophy)                   F

eb 3 2016 8:33AM          

                                                         

 

                          Screening for prostate cancer                   Feb 3 

2016 8:33AM               

                                                         

 

                    Resolved Bacterial UTI                   Feb 3 2016 8:16AM  

                    

             

 

                                        Benign non-nodular prostatic hyperplasia

 without lower urinary tract symptoms   

                          Feb 3 2016 8:16AM                                    

 

                          Post-traumatic bulbous urethral stricture             

      Feb 3 2016 8:16AM   

                                                         

 

                          Adenofibromatous hypertrophy of prostate              

     Paulo 3 2016 9:57AM    

                                                         

 

                          Resolved Stricture of male urethra                   J

un 3 2016 9:57AM          

                                                         

 

                    Resolved Bacterial UTI                   Paulo 3 2016 9:57AM  

                    

             

 

                          Urinary tract infection, site not specified           

        Paulo 3 2016 9:57AM 

                                                         

 

                          Bacterial infection, unspecified                   Paulo

 3 2016 9:57AM            

                                                         



            



                    Name                   Date of Onset                   Comme

nts                

 

                    Benign hypertrophy of prostate                   2015 

                    

              

 

                    Recurrent UTI                                               

         

 

                          Benign hypertrophy of prostate                   2015 9:56AM              

                                                         

 

                          Benign Hypertrophy of Prostate (BPH) Stable           

        Paulo 3 2015 11:26AM

                                                         

 

                          BPH (benign prostatic hypertrophy)                   F

eb 3 2016 8:33AM          

                                                         

 

                          Screening for prostate cancer                   Feb 3 

2016 8:33AM               

                                                         



            



                    Name                   Date of Onset                   Comme

nts                

 

                    Benign hypertrophy of prostate                   2015 

                    

              

 

                    Recurrent UTI                                               

         

 

                          Post-traumatic bulbous urethral stricture             

      2016          

                                                         

 

                                        Benign non-nodular prostatic hyperplasia

 without lower urinary tract symptoms   

                          2016                                    

 

                    Bacterial UTI                   2016                  

                  

 

                          Benign hypertrophy of prostate                   2015 9:56AM              

                                                         

 

                          Benign Hypertrophy of Prostate (BPH) Stable           

        Paulo 3 2015 11:26AM

                                                         

 

                          BPH (benign prostatic hypertrophy)                   F

eb 3 2016 8:33AM          

                                                         

 

                          Screening for prostate cancer                   Feb 3 

2016 8:33AM               

                                                         

 

                    Resolved Bacterial UTI                   Feb 3 2016 8:16AM  

                    

             

 

                                        Benign non-nodular prostatic hyperplasia

 without lower urinary tract symptoms   

                          Feb 3 2016 8:16AM                                    

 

                          Post-traumatic bulbous urethral stricture             

      Feb 3 2016 8:16AM   

                                                         



            



                    Name                   Date of Onset                   Comme

nts                

 

                    Benign hypertrophy of prostate                   2015 

                    

              

 

                    Recurrent UTI                                               

         

 

                          Post-traumatic bulbous urethral stricture             

      2016          

                                                         

 

                                        Benign non-nodular prostatic hyperplasia

 without lower urinary tract symptoms   

                          2016                                    

 

                    Bacterial UTI                   2016                  

                  

 

                    Stricture of male urethra                   2016      

                    

         

 

                          Adenofibromatous hypertrophy of prostate              

     2016           

                                                         

 

                          Benign hypertrophy of prostate                   2015 9:56AM              

                                                         

 

                          Benign Hypertrophy of Prostate (BPH) Stable           

        Paulo 3 2015 11:26AM

                                                         

 

                          BPH (benign prostatic hypertrophy)                   F

eb 3  8:33AM          

                                                         

 

                          Screening for prostate cancer                   Feb 3 

2016 8:33AM               

                                                         

 

                    Resolved Bacterial UTI                   Feb 3 2016 8:16AM  

                    

             

 

                                        Benign non-nodular prostatic hyperplasia

 without lower urinary tract symptoms   

                          Feb 3 2016 8:16AM                                    

 

                          Post-traumatic bulbous urethral stricture             

      Feb 3 2016 8:16AM   

                                                         

 

                          Adenofibromatous hypertrophy of prostate              

     Paulo 3 2016 9:57AM    

                                                         

 

                          Resolved Stricture of male urethra                   J

un 3  9:57AM          

                                                         

 

                    Resolved Bacterial UTI                   Paulo 3 2016 9:57AM  

                    

             



            



                    Name                   Date of Onset                   Comme

nts                

 

                    Benign hypertrophy of prostate                   2015 

                    

              

 

                          Benign hypertrophy of prostate                   2015 9:56AM              

                                                         

 

                          Benign Hypertrophy of Prostate (BPH) Stable           

        Paulo 3 2015 11:26AM

                                                         



            



                    Name                   Date of Onset                   Comme

nts                

 

                    Benign hypertrophy of prostate                   2015 

                    

              

 

                          Benign hypertrophy of prostate                   2015 9:56AM              

                                                         

 

                          Benign Hypertrophy of Prostate (BPH) Stable           

        Paulo 3 2015 11:26AM

                                                         



            



                    Name                   Date of Onset                   Comme

nts                

 

                    Benign hypertrophy of prostate                   2015 

                    

              

 

                    Recurrent UTI                                               

         

 

                          Post-traumatic bulbous urethral stricture             

      2016          

                                                         

 

                                        Benign non-nodular prostatic hyperplasia

 without lower urinary tract symptoms   

                          2016                                    

 

                    Bacterial UTI                   2016                  

                  

 

                    Stricture of male urethra                   2016      

                    

         

 

                          Adenofibromatous hypertrophy of prostate              

     2016           

                                                         

 

                    Idiopathic stricture of urethra                   2016

                    

               

 

                          Benign hypertrophy of prostate                   2015 9:56AM              

                                                         

 

                          Benign Hypertrophy of Prostate (BPH) Stable           

        Paulo 3 2015 11:26AM

                                                         

 

                          BPH (benign prostatic hypertrophy)                   F

eb 3  8:33AM          

                                                         

 

                          Screening for prostate cancer                   Feb 3 

2016 8:33AM               

                                                         

 

                    Resolved Bacterial UTI                   Feb 3 2016 8:16AM  

                    

             

 

                                        Benign non-nodular prostatic hyperplasia

 without lower urinary tract symptoms   

                          Feb 3 2016 8:16AM                                    

 

                          Post-traumatic bulbous urethral stricture             

      Feb 3 2016 8:16AM   

                                                         

 

                          Adenofibromatous hypertrophy of prostate              

     Paulo 3 2016 9:57AM    

                                                         

 

                          Resolved Stricture of male urethra                   J

un 3 2016 9:57AM          

                                                         

 

                    Resolved Bacterial UTI                   Paulo 3 2016 9:57AM  

                    

             

 

                          Urinary tract infection, site not specified           

        Paulo 3 2016 9:57AM 

                                                         

 

                          Bacterial infection, unspecified                   Paulo

 3 2016 9:57AM            

                                                         

 

                          Adenofibromatous hypertrophy of prostate              

     Dec 7 2016 1:01PM    

                                                         

 

                          Resolved Idiopathic stricture of urethra              

     Dec 7 2016 1:01PM    

                                                         

 

                          Urinary tract infection, site not specified           

        Dec 7 2016 1:01PM 

                                                         

 

                          Bacterial infection, unspecified                   Dec

 7 2016 1:01PM            

                                                         

 

                          BPH (benign prostatic hypertrophy)                   M

2017 8:15AM         

                                                         

 

                          Screening for prostate cancer                   May 22

 2017 8:15AM              

                                                         

 

                          Adenofibromatous hypertrophy of prostate              

     2017 9:47AM   

                                                         

 

                          Stricture of male urethra Stable                   2017 9:47AM           

                                                         



                                                                    



Advance Directives

                      



                    Directive                   Response                   Recor

ded Date/Time       

        

 

                    Advance Directives                   No                    7:20pm       

        

 

                    Health Care Power of                    No          

         16 

7:20pm                

 

                    [employer]                   No                   16 

7:20pm              

 

 

                    Resuscitation Status                   Full Code            

       16 

7:20pm                



            



                    Directive                   Response                   Recor

ded Date/Time       

        

 

                    Advance Directives                   No                    8:00pm       

        

 

                    Health Care Power of                    No          

         16 

8:00pm                

 

                    [employer]                   No                   16 

8:00pm              

 

 

                    Resuscitation Status                   Full Code            

       16 

8:00pm                



            



                    Directive                   Response                   Recor

ded Date/Time       

        

 

                    Advance Directives                   No                   11

/18/15 9:00am       

        

 

                    Health Care Power of                    No          

         11/18/15 

9:00am                

 

                    [employer]                   No                   11/18/15 

9:00am              

 

 

                    Resuscitation Status                   Full Code            

       11/18/15 

9:00am                



            



                    Directive                   Response                   Recor

ded Date/Time       

        

 

                    Advance Directives                   No                    1:10pm       

        

 

                    Health Care Power of                    No          

         16 

1:10pm                

 

                    [employer]                   No                   16 

1:10pm              

 

 

                    Resuscitation Status                   Full Code            

       16 

1:10pm                



            



                    Directive                   Response                   Recor

ded Date/Time       

        

 

                    Advance Directives                   No                   12

/04/15 7:40am       

        

 

                    Health Care Power of                    No          

         12/04/15 

7:40am                

 

                    [employer]                   No                   12/04/15 

7:40am              

 

 

                    Resuscitation Status                   Full Code            

       12/04/15 

7:40am                



            



                    Directive                   Response                   Recor

ded Date/Time       

        

 

                    Advance Directives                   No                   11

/13/15 5:35pm       

        

 

                    Health Care Power of                    No          

         11/13/15 

5:35pm                

 

                    [employer]                   No                   11/13/15 

5:35pm              

 

 

                    Resuscitation Status                   Full Code            

       11/13/15 

5:35pm                



            



                    Directive                   Response                   Recor

ded Date/Time       

        

 

                    Advance Directives                   No                   11

/27/15 6:18pm       

        

 

                    Health Care Power of                    No          

         11/27/15 

6:18pm                

 

                    [employer]                   No                   11/27/15 

4:28pm              

 

 

                    Resuscitation Status                   Full Code            

       11/27/15 

6:18pm                



            



                    Directive                   Response                   Recor

ded Date/Time       

        

 

                    Advance Directives                   No                   10

/09/15 9:00am       

        

 

                    Health Care Power of                    No          

         10/09/15 

9:00am                

 

                    [employer]                   No                   10/09/15 

9:00am              

 

 

                    Resuscitation Status                   Full Code            

       10/09/15 

9:00am                



            



                    Directive                   Response                   Recor

ded Date/Time       

        

 

                    Advance Directives                   No                    7:04am       

        

 

                    Health Care Power of                    No          

         14 

7:04am                

 

                    [employer]                   No                   14 

7:04am              

 

 

                    Resuscitation Status                   Full Code            

       14 

7:04am                



            



                    Directive                   Response                   Recor

ded Date/Time       

        

 

                    Advance Directives                   No                    12:36am      

         

 

                    Health Care Power of                    No          

         14 

12:36am                

 

                    [employer]                   No                   14 

12:36am             

  

 

                    Resuscitation Status                   Full Code            

       14 

12:36am                

 

                    Resuscitation Status                   Full Code            

       14 

11:58pm                



            



                    Directive                   Response                   Recor

ded Date            

   

 

                    Advance Directives                   N                    2:35pm        

       

 

                    Health Care Power of                    N           

        13 

2:35pm                

 

                    [employer]                   N                   13 2

:35pm               





            



                    Directive                   Response                   Recor

ded Date/Time       

        

 

                    Advance Directives                   No                   10

/01/14 11:03am      

         

 

                    Health Care Power of                    No          

         10/01/14 

11:03am                

 

                    [employer]                   No                   10/01/14 

11:03am             

  

 

                    Resuscitation Status                   Full Code            

       10/01/14 

11:03am                



            



                    Directive                   Response                   Recor

ded Date/Time       

        

 

                    Advance Directives                   No                   10

/03/14 10:20pm      

         

 

                    Health Care Power of                    No          

         10/03/14 

10:20pm                

 

                    [employer]                   No                   10/03/14 

10:20pm             

  

 

                    Resuscitation Status                   Full Code            

       10/03/14 

10:20pm                



            



                    Directive                   Response                   Recor

ded Date            

   

 

                    Advance Directives                   N                    12:28am       

        

 

                    Health Care Power of                    N           

        13 

12:28am                

 

                    [employer]                   N                   13 1

2:28am              

 



            



                    Directive                   Response                   Recor

ded Date/Time       

        

 

                    Advance Directives                   No                   09

/02/15 9:43pm       

        

 

                    Health Care Power of                    No          

         09/02/15 

9:43pm                

 

                    [employer]                   No                   09/02/15 

9:43pm              

 

 

                    Resuscitation Status                   Full Code            

       09/02/15 

9:43pm                



            



                    Directive                   Response                   Recor

ded Date/Time       

        

 

                    Advance Directives                   No                    10:02am      

         

 

                    Health Care Power of                    No          

         14 

10:02am                

 

                    Organ Donor                   No                   14 

10:02am             

  

 

                    Resuscitation Status                   Full Code            

       14 

10:02am                



            



                    Directive                   Response                   Recor

ded Date/Time       

        

 

                    Advance Directives                   No                   06

/23/15 11:50pm      

         

 

                    Health Care Power of                    No          

         06/23/15 

11:50pm                

 

                    Organ Donor                   No                   06/23/15 

11:50pm             

  

 

                    Resuscitation Status                   Full Code            

       06/23/15 

11:50pm                



            



                    Directive                   Response                   Recor

ded Date/Time       

        

 

                    Advance Directives                   No                   09

/02/15 9:43pm       

        

 

                    Health Care Power of                    No          

         09/02/15 

9:43pm                

 

                    Organ Donor                   No                   09/02/15 

9:43pm              

 



            



                    Directive                   Response                   Recor

ded Date/Time       

        

 

                    Advance Directives                   No                   10

/01/15 1:25pm       

        

 

                    Health Care Power of                    No          

         10/01/15 

1:25pm                

 

                    Organ Donor                   No                   10/01/15 

1:25pm              

 

 

                    Resuscitation Status                   Full Code            

       10/01/15 

1:25pm                



            



                    Directive                   Response                   Recor

ded Date/Time       

        

 

                    Advance Directives                   No                   08

/13/15 1:30pm       

        

 

                    Health Care Power of                    No          

         08/13/15 

1:30pm                

 

                    Organ Donor                   No                   08/13/15 

1:30pm              

 

 

                    Resuscitation Status                   Full Code            

       08/13/15 

1:30pm                



                                                                    



Discharge Instructions

                      



                                                            



You were admitted to [facility] on 2016 07:39 with a principal 
diagnosis of Urinary tract infection, site not specified                    



You had the following procedures done:                                          
Cystourethroscopy                                        



You had the following tests done:                                          UA 
W/MICRO W/C&S                                        



You were discharged from [facility] on 2016 09:15                    



Should you have any questions prior to discharge, please contact a member of 
your healthcare team. If you have left the hospital and have any questions, 
please contact your primary care physician.                                  



                                                                    



Instructions

                      



                                        Patient Decision Aid                

 

                                        CYSTOSCOPY; AFTER THE PROCEDURE         

       



                                                                    



Additional Source Comments

          This clinical document has been generated using [employer] 
software that has been certified by the Office of the National Coordinator for 
Health Information Technology (ONC 15.99.04.3023.Diam.31.00.0.978224) and the 
National Committee for  (NCQA, as an eMeasure certified 
technology).            

            

FOR RECORDS PERTAINING TO PATIENTS WHO ARE OR HAVE BEEN ENROLLED IN A CHEMICAL D
EPENDENCY/SUBSTANCE ABUSE PROGRAM, SOME INFORMATION MAY BE OMITTED. This clinica
l summary was aggregated from multiple sources.  Caution should be exercised in 
using it in the provision of clinical care. This summary normalizes information 
from multiple sources, and as a consequence, information in this document may ma
terially change the coding, format and clinical context of patient data. In courtney
tion, data may be omitted in some cases. CLINICAL DECISIONS SHOULD BE BASED ON T
HE PRIMARY CLINICAL RECORDS. [employer] provides no warranty or guara
ntee of the accuracy or completeness of information in this document.The followi
ng information is based on time limited clinical information
None

## 2025-01-17 NOTE — H&P ADULT - PROBLEM SELECTOR PLAN 3
CXR showing likely chronic changes on wet read  s/p _IVF and ceftriaxone in the ED  c/w ceftriaxone  f/u BCx,UCx  start Ceftriaxone

## 2025-01-17 NOTE — H&P ADULT - ATTENDING COMMENTS
Vital Signs Last 24 Hrs  T(C): 36.4 (17 Jan 2025 07:40), Max: 36.6 (16 Jan 2025 22:15)  T(F): 97.6 (17 Jan 2025 07:40), Max: 97.8 (16 Jan 2025 22:15)  HR: 135 (17 Jan 2025 07:40) (109 - 140)  BP: 116/76 (17 Jan 2025 07:40) (111/89 - 145/93)  RR: 18 (17 Jan 2025 07:40) (18 - 20)  SpO2: 100% (17 Jan 2025 07:40) (96% - 100%)  Parameters below as of 17 Jan 2025 07:40  Patient On (Oxygen Delivery Method): room air    Labs reviewed  wbc 18k  hgb 16.3    INR 1.45  PT 16.9  lactate 2.4 --> 2    Na 132  HCO3 - 19  BUN 24  TB 3.2        UA - ketone     CT BRAIN  SOFT TISSUES: Moderate pleural parenchymal scarring the lung apices,   right greater than left.    IMPRESSION:  CT BRAIN: Stable region of hypodensity in the right caudate nucleus,   corona radiata and right lentiform nucleus suspicious for subacute   infarct. No acute hemorrhage.    CTA NECK:  No significant stenosis of the cervical carotid or vertebral   arteries.    CTA HEAD:  No significant stenosis or occlusion of the major proximal   branches of the [place].    Impression  89 year old woman with medical hx including Paroxysmal A-fib, SVT, HLD brought from home confused and altered after she was found down > 24 hours after she was last seen in her normal functioning state.   Work up in the ED shows  hypoxic-ischemic encephalopathy likely related to possible subacute infarct in the right basal ganglia.   Will admit to tele for further work up    Plan   Admit to tele  Failed dysphagia screen  NPO   Serial NIHSS   ASA per rectum  Lipid panel A1c, vit D  ECHO   MRI brain   Neurology consult   PT evaluation and mgt  Med reconciliation   Gentle IVF hydration for hypovolemia with hemoconcentration  Fall and aspiration precaution

## 2025-01-17 NOTE — CHART NOTE - NSCHARTNOTEFT_GEN_A_CORE
med recc completed    PCP Dr. Paul Carpenter [phone]    patient uses [facility] [phone]    1. Verapamil 80mg BID

## 2025-01-17 NOTE — CONSULT NOTE ADULT - ASSESSMENT
89F with a PMH of paroxysmal Afib, SVT, HLD, rheumatoid arthritis; Mycobacterium avium complex admitted for acute encephalopathy and subacute CVA.        #  Paroxysmal atrial fibrillation.   ·  Plan: hx of afib not on AC, patient did not want to be on AC  TTE    # CVA (cerebrovascular accident).   ·  Plan: patient presenting with sided deficits  CTH showing subacute infarct  EKG showed tachy w RBBB  out of window for TNK

## 2025-01-18 LAB
ALBUMIN SERPL ELPH-MCNC: 2.5 G/DL — LOW (ref 3.5–5)
ALP SERPL-CCNC: 74 U/L — SIGNIFICANT CHANGE UP (ref 40–120)
ALT FLD-CCNC: 9 U/L DA — LOW (ref 10–60)
ANION GAP SERPL CALC-SCNC: 11 MMOL/L — SIGNIFICANT CHANGE UP (ref 5–17)
AST SERPL-CCNC: 25 U/L — SIGNIFICANT CHANGE UP (ref 10–40)
BILIRUB SERPL-MCNC: 1.8 MG/DL — HIGH (ref 0.2–1.2)
BUN SERPL-MCNC: 18 MG/DL — SIGNIFICANT CHANGE UP (ref 7–18)
CALCIUM SERPL-MCNC: 8.5 MG/DL — SIGNIFICANT CHANGE UP (ref 8.4–10.5)
CHLORIDE SERPL-SCNC: 105 MMOL/L — SIGNIFICANT CHANGE UP (ref 96–108)
CO2 SERPL-SCNC: 23 MMOL/L — SIGNIFICANT CHANGE UP (ref 22–31)
CREAT SERPL-MCNC: 0.44 MG/DL — LOW (ref 0.5–1.3)
EGFR: 91 ML/MIN/1.73M2 — SIGNIFICANT CHANGE UP
GLUCOSE SERPL-MCNC: 89 MG/DL — SIGNIFICANT CHANGE UP (ref 70–99)
HCT VFR BLD CALC: 41.6 % — SIGNIFICANT CHANGE UP (ref 34.5–45)
HGB BLD-MCNC: 13.8 G/DL — SIGNIFICANT CHANGE UP (ref 11.5–15.5)
MAGNESIUM SERPL-MCNC: 1.8 MG/DL — SIGNIFICANT CHANGE UP (ref 1.6–2.6)
MCHC RBC-ENTMCNC: 32 PG — SIGNIFICANT CHANGE UP (ref 27–34)
MCHC RBC-ENTMCNC: 33.2 G/DL — SIGNIFICANT CHANGE UP (ref 32–36)
MCV RBC AUTO: 96.5 FL — SIGNIFICANT CHANGE UP (ref 80–100)
NRBC # BLD: 0 /100 WBCS — SIGNIFICANT CHANGE UP (ref 0–0)
NRBC BLD-RTO: 0 /100 WBCS — SIGNIFICANT CHANGE UP (ref 0–0)
PHOSPHATE SERPL-MCNC: 1.8 MG/DL — LOW (ref 2.5–4.5)
PLATELET # BLD AUTO: 207 K/UL — SIGNIFICANT CHANGE UP (ref 150–400)
POTASSIUM SERPL-MCNC: 3.4 MMOL/L — LOW (ref 3.5–5.3)
POTASSIUM SERPL-SCNC: 3.4 MMOL/L — LOW (ref 3.5–5.3)
PROT SERPL-MCNC: 6.9 G/DL — SIGNIFICANT CHANGE UP (ref 6–8.3)
RBC # BLD: 4.31 M/UL — SIGNIFICANT CHANGE UP (ref 3.8–5.2)
RBC # FLD: 13.9 % — SIGNIFICANT CHANGE UP (ref 10.3–14.5)
SODIUM SERPL-SCNC: 139 MMOL/L — SIGNIFICANT CHANGE UP (ref 135–145)
WBC # BLD: 12.68 K/UL — HIGH (ref 3.8–10.5)
WBC # FLD AUTO: 12.68 K/UL — HIGH (ref 3.8–10.5)

## 2025-01-18 PROCEDURE — 99233 SBSQ HOSP IP/OBS HIGH 50: CPT

## 2025-01-18 RX ORDER — ASPIRIN 81 MG/1
81 TABLET, COATED ORAL DAILY
Refills: 0 | Status: DISCONTINUED | OUTPATIENT
Start: 2025-01-18 | End: 2025-01-19

## 2025-01-18 RX ORDER — ATORVASTATIN CALCIUM 80 MG/1
40 TABLET, FILM COATED ORAL DAILY
Refills: 0 | Status: DISCONTINUED | OUTPATIENT
Start: 2025-01-18 | End: 2025-01-27

## 2025-01-18 RX ADMIN — CEFTRIAXONE 100 MILLIGRAM(S): 250 INJECTION, POWDER, FOR SOLUTION INTRAMUSCULAR; INTRAVENOUS at 20:58

## 2025-01-18 RX ADMIN — ASPIRIN 81 MILLIGRAM(S): 81 TABLET, COATED ORAL at 11:57

## 2025-01-18 RX ADMIN — ACETAMINOPHEN, DIPHENHYDRAMINE HCL, PHENYLEPHRINE HCL 3 MILLIGRAM(S): 325; 25; 5 TABLET ORAL at 21:54

## 2025-01-18 RX ADMIN — Medication 75 MILLIGRAM(S): at 11:57

## 2025-01-18 RX ADMIN — ATORVASTATIN CALCIUM 40 MILLIGRAM(S): 80 TABLET, FILM COATED ORAL at 21:39

## 2025-01-18 RX ADMIN — ENOXAPARIN SODIUM 40 MILLIGRAM(S): 100 INJECTION SUBCUTANEOUS at 17:38

## 2025-01-18 NOTE — PHYSICAL THERAPY INITIAL EVALUATION ADULT - DIAGNOSIS, PT EVAL
(ICF Model) Pt. present w/deficits in Body Structures/Function (Impairments), incl: Strength, Balance, Endurance, Cognitive leading to deficits in performing the below noted Activities: bed mobility, transfers and ambulation.

## 2025-01-18 NOTE — PROGRESS NOTE ADULT - ASSESSMENT
89F with a PMH of paroxysmal Afib, SVT, HLD, rheumatoid arthritis; Mycobacterium avium complex admitted for acute encephalopathy and subacute CVA.        #  Paroxysmal atrial fibrillation.   ·  Plan: hx of afib not on AC, patient did not want to be on AC  BB    TTE    # CVA (cerebrovascular accident).   ·  Plan: patient presenting with sided deficits  CTH showing subacute infarct  EKG showed tachy w RBBB  out of window for TNK

## 2025-01-18 NOTE — PHYSICAL THERAPY INITIAL EVALUATION ADULT - FUNCTIONAL LIMITATIONS, PT EVAL
MODIFIED ZEFERINO SCALE: 4: Moderately Severe Disability: Unable to walk without assistance and unable to look after own bodily needs without assistance./self-care/home management/community/leisure

## 2025-01-18 NOTE — PROGRESS NOTE ADULT - SUBJECTIVE AND OBJECTIVE BOX
PATIENT SEEN AND EXAMINED BY TAMI AN M.D. ON :- 1/18/25  DATE OF SERVICE:   1/18/25          Interim events noted,Labs ,Radiological studies and Cardiology tests reviewed .    Patient is a 89y old  Female who presents with a chief complaint of acute encephalopathy , subacute stroke (18 Jan 2025 13:36)      HPI:  89F from home walks with cane PMH of paroxysmal Afib, SVT, HLD, rheumatoid arthritis; Mycobacterium avium complex presented with acute mental status changes. Patient is AOx3 at baseline, living alone. Collateral obtained by niece. Patient was last seen 2 days ago and may have had a doctors appointment with Dr Rhodes, her PCP. Patient was found by her neighbor on the floor in her apartment.     (17 Jan 2025 06:49)      PAST MEDICAL & SURGICAL HISTORY:  Paroxysmal atrial fibrillation      HTN (hypertension)      JACINTA (mycobacterium avium-intracellulare)          PREVIOUS DIAGNOSTIC TESTING:      ECHO  FINDINGS:    STRESS  FINDINGS:    CATHETERIZATION  FINDINGS:    MEDICATIONS  (STANDING):  aspirin  chewable 81 milliGRAM(s) Oral daily  atorvastatin 40 milliGRAM(s) Oral daily  cefTRIAXone   IVPB 1000 milliGRAM(s) IV Intermittent every 24 hours  clopidogrel Tablet 75 milliGRAM(s) Oral daily  enoxaparin Injectable 40 milliGRAM(s) SubCutaneous every 24 hours  lactated ringers. 1000 milliLiter(s) (90 mL/Hr) IV Continuous <Continuous>    MEDICATIONS  (PRN):  acetaminophen     Tablet .. 650 milliGRAM(s) Oral every 6 hours PRN Temp greater or equal to 38C (100.4F), Mild Pain (1 - 3)  aluminum hydroxide/magnesium hydroxide/simethicone Suspension 30 milliLiter(s) Oral every 4 hours PRN Dyspepsia  melatonin 3 milliGRAM(s) Oral at bedtime PRN Insomnia  metoprolol tartrate Injectable 5 milliGRAM(s) IV Push every 6 hours PRN HR >120  ondansetron Injectable 4 milliGRAM(s) IV Push every 8 hours PRN Nausea and/or Vomiting      FAMILY HISTORY:      SOCIAL HISTORY:    CIGARETTES:    ALCOHOL:    REVIEW OF SYSTEMS:  CONSTITUTIONAL: No fever, weight loss, or fatigue  EYES: No eye pain, visual disturbances, or discharge  ENMT:  No difficulty hearing, tinnitus, vertigo; No sinus or throat pain  NECK: No pain or stiffness  RESPIRATORY: No cough, wheezing, chills or hemoptysis; No shortness of breath  CARDIOVASCULAR: No chest pain, palpitations, dizziness, or leg swelling  GASTROINTESTINAL: No abdominal or epigastric pain. No nausea, vomiting, or hematemesis; No diarrhea or constipation. No melena or hematochezia.  GENITOURINARY: No dysuria, frequency, hematuria, or incontinence  NEUROLOGICAL: No headaches, memory loss, loss of strength, numbness, or tremors  SKIN: No itching, burning, rashes, or lesions   LYMPH NODES: No enlarged glands  ENDOCRINE: No heat or cold intolerance; No hair loss  MUSCULOSKELETAL: No joint pain or swelling; No muscle, back, or extremity pain  PSYCHIATRIC: No depression, anxiety, mood swings, or difficulty sleeping  HEME/LYMPH: No easy bruising, or bleeding gums  ALLERY AND IMMUNOLOGIC: No hives or eczema    Vital Signs Last 24 Hrs  T(C): 36.6 (18 Jan 2025 20:30), Max: 36.8 (18 Jan 2025 00:49)  T(F): 97.9 (18 Jan 2025 20:30), Max: 98.2 (18 Jan 2025 00:49)  HR: 110 (18 Jan 2025 20:30) (105 - 119)  BP: 130/82 (18 Jan 2025 20:30) (118/86 - 148/84)  BP(mean): --  RR: 18 (18 Jan 2025 20:30) (18 - 20)  SpO2: 93% (18 Jan 2025 20:30) (91% - 98%)    Parameters below as of 18 Jan 2025 20:30  Patient On (Oxygen Delivery Method): room air          PHYSICAL EXAM:  GENERAL: NAD, well-groomed, well-developed  HEAD:  Atraumatic, Normocephalic  EYES: EOMI, PERRLA, conjunctiva and sclera clear  ENMT: No tonsillar erythema, exudates, or enlargement; Moist mucous membranes, Good dentition, No lesions  NECK: Supple, No JVD, Normal thyroid  NERVOUS SYSTEM:  Alert & Oriented X3, Good concentration; Motor Strength 5/5 B/L upper and lower extremities; DTRs 2+ intact and symmetric  CHEST/LUNG: Clear to percussion bilaterally; No rales, rhonchi, wheezing, or rubs  HEART: Regular rate and rhythm; No murmurs, rubs, or gallops  ABDOMEN: Soft, Nontender, Nondistended; Bowel sounds present  EXTREMITIES:  2+ Peripheral Pulses, No clubbing, cyanosis, or edema  LYMPH: No lymphadenopathy noted  SKIN: No rashes or lesions      INTERPRETATION OF TELEMETRY:    ECG:    ISAIASVASC:     LABS:                        13.8   12.68 )-----------( 207      ( 18 Jan 2025 06:10 )             41.6     01-18    139  |  105  |  18  ----------------------------<  89  3.4[L]   |  23  |  0.44[L]    Ca    8.5      18 Jan 2025 06:10  Phos  1.8     01-18  Mg     1.8     01-18    TPro  6.9  /  Alb  2.5[L]  /  TBili  1.8[H]  /  DBili  x   /  AST  25  /  ALT  9[L]  /  AlkPhos  74  01-18        PT/INR - ( 16 Jan 2025 23:02 )   PT: 16.9 sec;   INR: 1.45 ratio         PTT - ( 16 Jan 2025 23:02 )  PTT:30.6 sec  Urinalysis Basic - ( 18 Jan 2025 06:10 )    Color: x / Appearance: x / SG: x / pH: x  Gluc: 89 mg/dL / Ketone: x  / Bili: x / Urobili: x   Blood: x / Protein: x / Nitrite: x   Leuk Esterase: x / RBC: x / WBC x   Sq Epi: x / Non Sq Epi: x / Bacteria: x      Lipid Panel:   I&O's Summary      RADIOLOGY & ADDITIONAL STUDIES:

## 2025-01-18 NOTE — PROGRESS NOTE ADULT - SUBJECTIVE AND OBJECTIVE BOX
Patient is a 89y old  Female who presents with a chief complaint of acute encephalopathy , subacute stroke (17 Jan 2025 13:10)      INTERVAL HPI/OVERNIGHT EVENTS: no events noted overnight.    MEDICATIONS  (STANDING):  aspirin  chewable 81 milliGRAM(s) Oral daily  atorvastatin 40 milliGRAM(s) Oral daily  cefTRIAXone   IVPB 1000 milliGRAM(s) IV Intermittent every 24 hours  clopidogrel Tablet 75 milliGRAM(s) Oral daily  enoxaparin Injectable 40 milliGRAM(s) SubCutaneous every 24 hours  lactated ringers. 1000 milliLiter(s) (90 mL/Hr) IV Continuous <Continuous>    MEDICATIONS  (PRN):  acetaminophen     Tablet .. 650 milliGRAM(s) Oral every 6 hours PRN Temp greater or equal to 38C (100.4F), Mild Pain (1 - 3)  aluminum hydroxide/magnesium hydroxide/simethicone Suspension 30 milliLiter(s) Oral every 4 hours PRN Dyspepsia  melatonin 3 milliGRAM(s) Oral at bedtime PRN Insomnia  metoprolol tartrate Injectable 5 milliGRAM(s) IV Push every 6 hours PRN HR >120  ondansetron Injectable 4 milliGRAM(s) IV Push every 8 hours PRN Nausea and/or Vomiting      __________________________________________________  REVIEW OF SYSTEMS:    CONSTITUTIONAL: No fever,   EYES: no acute visual disturbances  NECK: No pain or stiffness  RESPIRATORY: No cough; No shortness of breath  CARDIOVASCULAR: No chest pain, no palpitations  GASTROINTESTINAL: No pain. No nausea or vomiting; No diarrhea   NEUROLOGICAL: No headache or numbness, no tremors  MUSCULOSKELETAL: No joint pain, no muscle pain  GENITOURINARY: no dysuria, no frequency, no hesitancy  PSYCHIATRY: no depression , no anxiety  ALL OTHER  ROS negative        Vital Signs Last 24 Hrs  T(C): 36.4 (18 Jan 2025 11:30), Max: 36.8 (18 Jan 2025 00:49)  T(F): 97.5 (18 Jan 2025 11:30), Max: 98.2 (18 Jan 2025 00:49)  HR: 105 (18 Jan 2025 11:30) (105 - 119)  BP: 141/87 (18 Jan 2025 11:30) (118/86 - 148/84)  BP(mean): --  RR: 18 (18 Jan 2025 11:30) (18 - 22)  SpO2: 94% (18 Jan 2025 11:30) (91% - 100%)    Parameters below as of 18 Jan 2025 11:30  Patient On (Oxygen Delivery Method): room air        ________________________________________________  PHYSICAL EXAM:  See Below     _________________________________________________  LABS:                        13.8   12.68 )-----------( 207      ( 18 Jan 2025 06:10 )             41.6     01-18    139  |  105  |  18  ----------------------------<  89  3.4[L]   |  23  |  0.44[L]    Ca    8.5      18 Jan 2025 06:10  Phos  1.8     01-18  Mg     1.8     01-18    TPro  6.9  /  Alb  2.5[L]  /  TBili  1.8[H]  /  DBili  x   /  AST  25  /  ALT  9[L]  /  AlkPhos  74  01-18    PT/INR - ( 16 Jan 2025 23:02 )   PT: 16.9 sec;   INR: 1.45 ratio         PTT - ( 16 Jan 2025 23:02 )  PTT:30.6 sec  Urinalysis Basic - ( 18 Jan 2025 06:10 )    Color: x / Appearance: x / SG: x / pH: x  Gluc: 89 mg/dL / Ketone: x  / Bili: x / Urobili: x   Blood: x / Protein: x / Nitrite: x   Leuk Esterase: x / RBC: x / WBC x   Sq Epi: x / Non Sq Epi: x / Bacteria: x      CAPILLARY BLOOD GLUCOSE            RADIOLOGY & ADDITIONAL TESTS:    Imaging Personally Reviewed:  YES    Consultant(s) Notes Reviewed:   YES    Care Discussed with Consultants : YES     Plan of care was discussed with patient and /or primary care giver; all questions and concerns were addressed and care was aligned with patient's wishes.

## 2025-01-18 NOTE — PHYSICAL THERAPY INITIAL EVALUATION ADULT - MODIFIED CLINICAL TEST OF SENSORY INTEGRATION IN BALANCE TEST
Postural Assessment Stroke Scale Total Score (PASS): 7/36 Total; (Maintain Posture Subscale = 1/15)+(Change Posture Subscale = 6/21): Lower scores equates to greater impairment in postural control.

## 2025-01-18 NOTE — PROGRESS NOTE ADULT - ASSESSMENT
89 yo F w/ PMHx of Paroxysmal A-fib, SVT, HLD brought from home confused and altered after she was found down > 24 hours after she was last seen in her normal functioning state.   Work up in the ED shows  hypoxic-ischemic encephalopathy likely related to possible subacute infarct in the right basal ganglia admitted for further management of possible acute stroke.     On bedside evaluation no complaints does not feel hungry.     Exam:  NAD   tachycardiac in afib   CTABL  abdo soft non tender   ext wwp   anox2 baseline no FND currently       Labs and Imaging reviewed:                        13.8   12.68 )-----------( 207      ( 2025 06:10 )             41.6   139  |  105  |  18  ----------------------------(  @ 06:10  3.4[L]   |  23  |  0.44[L]    Ca: 8.5   Phos: 1.8[L]   M.8    TPro: 6.9 / Alb: 2.5[L] /  TBili 1.8[H] / DBili x  /  AST 25 /  ALT 9[L] /  AlkPhos  74    CT BRAIN  SOFT TISSUES: Moderate pleural parenchymal scarring the lung apices,   right greater than left.    IMPRESSION:  CT BRAIN: Stable region of hypodensity in the right caudate nucleus,   corona radiata and right lentiform nucleus suspicious for subacute   infarct. No acute hemorrhage.    CTA NECK:  No significant stenosis of the cervical carotid or vertebral   arteries.    CTA HEAD:  No significant stenosis or occlusion of the major proximal   branches of the [place].      A/P:  #Acute vs Subacute infarct   #Afib intermittent RVR     - monitor tele  - re try bedside swallow, give diet, asa statin, plavix   - f/u on Brain MRI and EEG  - Serial NIHSS f/u Neuro Dr. Longo   - s/p ASA per rectum  - f/u Lipid panel A1c, vit D  - f/u TTE    - cannot swallow right now no po meds   - PT evaluation and mgt  - Med reconciliation

## 2025-01-19 LAB
ANION GAP SERPL CALC-SCNC: 9 MMOL/L — SIGNIFICANT CHANGE UP (ref 5–17)
ANION GAP SERPL CALC-SCNC: 9 MMOL/L — SIGNIFICANT CHANGE UP (ref 5–17)
BUN SERPL-MCNC: 15 MG/DL — SIGNIFICANT CHANGE UP (ref 7–18)
BUN SERPL-MCNC: 18 MG/DL — SIGNIFICANT CHANGE UP (ref 7–18)
CALCIUM SERPL-MCNC: 8.3 MG/DL — LOW (ref 8.4–10.5)
CALCIUM SERPL-MCNC: 8.4 MG/DL — SIGNIFICANT CHANGE UP (ref 8.4–10.5)
CHLORIDE SERPL-SCNC: 101 MMOL/L — SIGNIFICANT CHANGE UP (ref 96–108)
CHLORIDE SERPL-SCNC: 103 MMOL/L — SIGNIFICANT CHANGE UP (ref 96–108)
CO2 SERPL-SCNC: 25 MMOL/L — SIGNIFICANT CHANGE UP (ref 22–31)
CO2 SERPL-SCNC: 27 MMOL/L — SIGNIFICANT CHANGE UP (ref 22–31)
CREAT SERPL-MCNC: 0.42 MG/DL — LOW (ref 0.5–1.3)
CREAT SERPL-MCNC: 0.55 MG/DL — SIGNIFICANT CHANGE UP (ref 0.5–1.3)
EGFR: 86 ML/MIN/1.73M2 — SIGNIFICANT CHANGE UP
EGFR: 92 ML/MIN/1.73M2 — SIGNIFICANT CHANGE UP
GLUCOSE SERPL-MCNC: 102 MG/DL — HIGH (ref 70–99)
GLUCOSE SERPL-MCNC: 91 MG/DL — SIGNIFICANT CHANGE UP (ref 70–99)
HCT VFR BLD CALC: 39 % — SIGNIFICANT CHANGE UP (ref 34.5–45)
HGB BLD-MCNC: 13.3 G/DL — SIGNIFICANT CHANGE UP (ref 11.5–15.5)
MCHC RBC-ENTMCNC: 32.3 PG — SIGNIFICANT CHANGE UP (ref 27–34)
MCHC RBC-ENTMCNC: 34.1 G/DL — SIGNIFICANT CHANGE UP (ref 32–36)
MCV RBC AUTO: 94.7 FL — SIGNIFICANT CHANGE UP (ref 80–100)
NRBC # BLD: 0 /100 WBCS — SIGNIFICANT CHANGE UP (ref 0–0)
NRBC BLD-RTO: 0 /100 WBCS — SIGNIFICANT CHANGE UP (ref 0–0)
PLATELET # BLD AUTO: 193 K/UL — SIGNIFICANT CHANGE UP (ref 150–400)
POTASSIUM SERPL-MCNC: 2.9 MMOL/L — CRITICAL LOW (ref 3.5–5.3)
POTASSIUM SERPL-MCNC: 3.9 MMOL/L — SIGNIFICANT CHANGE UP (ref 3.5–5.3)
POTASSIUM SERPL-SCNC: 2.9 MMOL/L — CRITICAL LOW (ref 3.5–5.3)
POTASSIUM SERPL-SCNC: 3.9 MMOL/L — SIGNIFICANT CHANGE UP (ref 3.5–5.3)
RBC # BLD: 4.12 M/UL — SIGNIFICANT CHANGE UP (ref 3.8–5.2)
RBC # FLD: 14.1 % — SIGNIFICANT CHANGE UP (ref 10.3–14.5)
SODIUM SERPL-SCNC: 137 MMOL/L — SIGNIFICANT CHANGE UP (ref 135–145)
SODIUM SERPL-SCNC: 137 MMOL/L — SIGNIFICANT CHANGE UP (ref 135–145)
WBC # BLD: 9.35 K/UL — SIGNIFICANT CHANGE UP (ref 3.8–10.5)
WBC # FLD AUTO: 9.35 K/UL — SIGNIFICANT CHANGE UP (ref 3.8–10.5)

## 2025-01-19 PROCEDURE — 99233 SBSQ HOSP IP/OBS HIGH 50: CPT

## 2025-01-19 RX ORDER — APIXABAN 5 MG/1
1 TABLET, FILM COATED ORAL
Qty: 60 | Refills: 0
Start: 2025-01-19 | End: 2025-02-17

## 2025-01-19 RX ORDER — APIXABAN 5 MG/1
2.5 TABLET, FILM COATED ORAL EVERY 12 HOURS
Refills: 0 | Status: DISCONTINUED | OUTPATIENT
Start: 2025-01-19 | End: 2025-01-27

## 2025-01-19 RX ORDER — POTASSIUM CHLORIDE 750 MG/1
40 TABLET, EXTENDED RELEASE ORAL ONCE
Refills: 0 | Status: COMPLETED | OUTPATIENT
Start: 2025-01-19 | End: 2025-01-19

## 2025-01-19 RX ORDER — ALBUTEROL 90 MCG
2 AEROSOL REFILL (GRAM) INHALATION EVERY 6 HOURS
Refills: 0 | Status: DISCONTINUED | OUTPATIENT
Start: 2025-01-19 | End: 2025-01-27

## 2025-01-19 RX ORDER — POTASSIUM CHLORIDE 750 MG/1
10 TABLET, EXTENDED RELEASE ORAL
Refills: 0 | Status: COMPLETED | OUTPATIENT
Start: 2025-01-19 | End: 2025-01-19

## 2025-01-19 RX ORDER — ATENOLOL 50 MG
25 TABLET ORAL DAILY
Refills: 0 | Status: DISCONTINUED | OUTPATIENT
Start: 2025-01-19 | End: 2025-01-21

## 2025-01-19 RX ORDER — ESCITALOPRAM 10 MG/1
20 TABLET, FILM COATED ORAL DAILY
Refills: 0 | Status: DISCONTINUED | OUTPATIENT
Start: 2025-01-19 | End: 2025-01-27

## 2025-01-19 RX ADMIN — ASPIRIN 81 MILLIGRAM(S): 81 TABLET, COATED ORAL at 12:18

## 2025-01-19 RX ADMIN — Medication 75 MILLIGRAM(S): at 12:19

## 2025-01-19 RX ADMIN — POTASSIUM CHLORIDE 100 MILLIEQUIVALENT(S): 750 TABLET, EXTENDED RELEASE ORAL at 08:25

## 2025-01-19 RX ADMIN — APIXABAN 2.5 MILLIGRAM(S): 5 TABLET, FILM COATED ORAL at 21:27

## 2025-01-19 RX ADMIN — CEFTRIAXONE 100 MILLIGRAM(S): 250 INJECTION, POWDER, FOR SOLUTION INTRAMUSCULAR; INTRAVENOUS at 20:16

## 2025-01-19 RX ADMIN — POTASSIUM CHLORIDE 100 MILLIEQUIVALENT(S): 750 TABLET, EXTENDED RELEASE ORAL at 09:34

## 2025-01-19 RX ADMIN — ATORVASTATIN CALCIUM 40 MILLIGRAM(S): 80 TABLET, FILM COATED ORAL at 21:27

## 2025-01-19 RX ADMIN — POTASSIUM CHLORIDE 40 MILLIEQUIVALENT(S): 750 TABLET, EXTENDED RELEASE ORAL at 06:53

## 2025-01-19 RX ADMIN — POTASSIUM CHLORIDE 100 MILLIEQUIVALENT(S): 750 TABLET, EXTENDED RELEASE ORAL at 07:25

## 2025-01-19 RX ADMIN — SODIUM CHLORIDE 90 MILLILITER(S): 9 INJECTION, SOLUTION INTRAVENOUS at 07:03

## 2025-01-19 NOTE — PROGRESS NOTE ADULT - PROBLEM SELECTOR PLAN 3
CXR showing likely chronic changes on wet read  s/p _IVF and ceftriaxone in the ED  c/w ceftriaxone  f/u BCx,UCx  start Ceftriaxone CXR showing likely chronic changes on wet read  s/p _IVF and ceftriaxone in the ED  c/w ceftriaxone  BCx 1/17: NGTD 48h  UCx: none

## 2025-01-19 NOTE — PROGRESS NOTE ADULT - PROBLEM SELECTOR PLAN 1
patient found on the floor at home  CTH showing subacute infarct  Patient meeting SIRS criteria HR>90 and WBC>13k  2/2 CVA vs infectious  s/p ASA suppository  neuro Dr. Longo following

## 2025-01-19 NOTE — SWALLOW BEDSIDE ASSESSMENT ADULT - SLP PERTINENT HISTORY OF CURRENT PROBLEM
Per chart review, patient is a 89F with a PMH of paroxysmal Afib, SVT, HLD, rheumatoid arthritis; Mycobacterium avium complex admitted for acute encephalopathy and subacute CVA. Patient failed initial dysphagia screening and referred to SLP for evaluation.

## 2025-01-19 NOTE — SWALLOW BEDSIDE ASSESSMENT ADULT - COMMENTS
Pt awake, alert, oriented x1-2 , responsive to all queries. Voice mildly hoarse however patient reporting this is baseline. Patient repositioned upright for assessment and demonstrates adequate lingual and labial range of motion and coordination, generalized weakness appreciated. Patient reporting reduced appetite at baseline stating "I'm not a good eater normally, I don't get hungry." Patient denies any associated dysphagia at baseline.    HCT: CT HEAD: Approximately 2.5 x 2 cm acute versus early subacute infarct involving the right caudate nucleus, right corona radiata and right lentiform   nucleus. Small age-indeterminate transcortical infarct in the left occipital lobe   may be subacute or chronic.  An incidental 7 mm extra-axial ossific versus calcific focus in anterior   left frontal region may represent a meningioma, osteoma or calvarial   hyperostosis

## 2025-01-19 NOTE — SWALLOW BEDSIDE ASSESSMENT ADULT - SWALLOW EVAL: DIAGNOSIS
patient demonstrates grossly intact swallow function. Patient demonstrates prolonged but functional mastication and manipulation of solids, timely a-p transport and swallow initiation. Patient with mild residue post swallow due to xerostomia, however resolves with liquid wash and alternating liquids and solids. No overt or subtle indicators of aspiration appreciated.

## 2025-01-19 NOTE — SWALLOW BEDSIDE ASSESSMENT ADULT - POSITIONING
Protocol For Photochemotherapy For Severe Photoresponsive Dermatoses: Puva: The patient received Photochemotherapy for severe photoresponsive dermatoses: PUVA requiring at least 4 to 8 hours of care under direct physician supervision. upright (90 degrees)

## 2025-01-19 NOTE — PROGRESS NOTE ADULT - ASSESSMENT
89F with a PMH of paroxysmal Afib, SVT, HLD, rheumatoid arthritis; Mycobacterium avium complex admitted for acute encephalopathy and subacute CVA.    PATIENT DID NOT RECALL MEDICATIONS AND  EMERGENCY CONTACT COULD NOT RECALL .  PRIMARY TEAM TO CONFIRM MED REC. DR [facility] MAY HAVE MEDS. 89F with a PMH of paroxysmal Afib, SVT, HLD, rheumatoid arthritis; Mycobacterium avium complex admitted for acute encephalopathy and subacute CVA.

## 2025-01-19 NOTE — PROGRESS NOTE ADULT - PROBLEM SELECTOR PLAN 4
hx of afib not on AC, patient did not want to be on AC  CONFIRM MED REC PLZ hx of afib on verapamil 120 bid and atenolol 20 qd  not on AC    - c/w atenolol 20 qd  - start eliquis 2.5mg bid

## 2025-01-19 NOTE — PROGRESS NOTE ADULT - ATTENDING COMMENTS
89 yo F w/ PMHx of Paroxysmal A-fib, SVT, HLD brought from home confused and altered after she was found down > 24 hours after she was last seen in her normal functioning state. Work up in the ED shows  hypoxic-ischemic encephalopathy likely related to possible subacute infarct in the right basal ganglia admitted for further management of possible acute stroke.     On bedside evaluation no complaints was able to eat pancakes today, feeling well. Discussed AC and she is more willing to try but in the past felt like she did not need.     Exam:  NAD   tachycardiac in afib   CTABL  abdo soft non tender   ext wwp   anox2 baseline no FND currently     Labs and Imaging reviewed:                        13.8   12.68 )-----------( 207      ( 2025 06:10 )             41.6   139  |  105  |  18  ----------------------------( 89     -18 @ 06:10  3.4[L]   |  23  |  0.44[L]    Ca: 8.5   Phos: 1.8[L]   M.8    TPro: 6.9 / Alb: 2.5[L] /  TBili 1.8[H] / DBili x  /  AST 25 /  ALT 9[L] /  AlkPhos  74      A/P:  #Acute vs Subacute infarct   #Afib intermittent RVR     - monitor tele, no events so far   - K 2.9 today s/p repletion, repeat BMP in evening no more cbc needed. no bmp if next one normal   - SLP evaluated, adjust diet per recs, c/w statin, can start DOAC tonight 2.5 BID   - f/u on Brain MRI and EEG  - Serial NIHSS f/u Neuro Dr. Longo   - f/u Lipid panel A1c, vit D  - f/u TTE    - PT evaluation for dispo  - Med reconciliation

## 2025-01-19 NOTE — CHART NOTE - NSCHARTNOTEFT_GEN_A_CORE
K noted to be 2.9. Pt asymptomatic at this time. Will obtain a 12 lead ECG in an abundance of caution and give 40KCl po and 3 riders. AM team to follow.

## 2025-01-19 NOTE — PROGRESS NOTE ADULT - PROBLEM SELECTOR PLAN 2
patient presenting with sided deficits  CTH showing subacute infarct  EKG showed tachy w RBBB  out of window for TNK  aspirin suppository  f/u TTE  f/u MRI  f/u lipid panel, A1c  q4 neuro checks  aspiration precautions  dysphagia screen failed  f/u speech and swallow  Neuro Dr. Longo following patient presenting with sided deficits  CTH showing subacute infarct  EKG showed tachy w RBBB  out of window for TNK  aspirin suppository  lipid panel: chol 57, tg 58, hdl 24, ldl 19  A1c 5.8  f/u TTE  f/u MRI  q4 neuro checks  aspiration precautions  initial dysphagia screen failed, passed 2nd dysphagia screen  f/u speech and swallow  Neuro Dr. Longo following

## 2025-01-19 NOTE — PROGRESS NOTE ADULT - SUBJECTIVE AND OBJECTIVE BOX
PGY-1 Progress Note discussed with attending    PLEASE MS TEAMS AUTHOR TILL 5:00 PM    PLEASE CONTACT ON CALL TEAM:  - On Call Team (Please refer to Letty) FROM 5:00 PM - 8:30PM  - Nightfloat Team FROM 8:30 -7:30 AM      INTERVAL HPI/OVERNIGHT EVENTS: No acute events overnight.    SUBJECTIVE: Patient seen and examined at bedside this morning. Feels fatigued. States that she does not remember the events leading to her hospitalization but that her neighbors and niece later explained to her.    ---------------------------    REVIEW OF SYSTEMS:  CONSTITUTIONAL: No fever, weight loss, +fatigue  RESPIRATORY: No cough, wheezing, chills or hemoptysis; No shortness of breath  CARDIOVASCULAR: No chest pain, palpitations, dizziness, or leg swelling  GASTROINTESTINAL: No abdominal pain. No nausea, vomiting, or hematemesis; No diarrhea or constipation. No melena or hematochezia.  GENITOURINARY: No dysuria or hematuria, urinary frequency  NEUROLOGICAL: No headaches, memory loss, loss of strength, numbness, or tremors  SKIN: No itching, burning, rashes, or lesions     MEDICATIONS  (STANDING):  aspirin  chewable 81 milliGRAM(s) Oral daily  atorvastatin 40 milliGRAM(s) Oral daily  cefTRIAXone   IVPB 1000 milliGRAM(s) IV Intermittent every 24 hours  clopidogrel Tablet 75 milliGRAM(s) Oral daily  enoxaparin Injectable 40 milliGRAM(s) SubCutaneous every 24 hours  lactated ringers. 1000 milliLiter(s) (90 mL/Hr) IV Continuous <Continuous>    MEDICATIONS  (PRN):  acetaminophen     Tablet .. 650 milliGRAM(s) Oral every 6 hours PRN Temp greater or equal to 38C (100.4F), Mild Pain (1 - 3)  aluminum hydroxide/magnesium hydroxide/simethicone Suspension 30 milliLiter(s) Oral every 4 hours PRN Dyspepsia  melatonin 3 milliGRAM(s) Oral at bedtime PRN Insomnia  metoprolol tartrate Injectable 5 milliGRAM(s) IV Push every 6 hours PRN HR >120  ondansetron Injectable 4 milliGRAM(s) IV Push every 8 hours PRN Nausea and/or Vomiting      Vital Signs Last 24 Hrs  T(C): 36.5 (19 Jan 2025 08:00), Max: 36.7 (19 Jan 2025 04:57)  T(F): 97.7 (19 Jan 2025 08:00), Max: 98.1 (19 Jan 2025 04:57)  HR: 108 (19 Jan 2025 08:00) (87 - 114)  BP: 128/94 (19 Jan 2025 08:00) (107/69 - 141/87)  BP(mean): --  RR: 17 (19 Jan 2025 08:00) (17 - 18)  SpO2: 94% (19 Jan 2025 08:00) (91% - 99%)    Parameters below as of 19 Jan 2025 08:00  Patient On (Oxygen Delivery Method): room air        -----------------------------    PHYSICAL EXAMINATION:  GENERAL: NAD, elderly female lying in bed  HEAD:  Atraumatic, Normocephalic  EYES:  conjunctiva and sclera clear  NECK: Supple, No JVD  CHEST/LUNG: Clear to auscultation bilaterally; No rales, rhonchi, wheezing, or rubs  HEART: Regular rate and rhythm; No murmurs, rubs, or gallops  ABDOMEN: Soft, Nontender, Nondistended; Bowel sounds present, no pain or masses on palpation  NERVOUS SYSTEM:  Alert & Oriented X2, CN2-12 intact, 4/5 strength in all extremities  : voiding well  EXTREMITIES:  2+ Peripheral Pulses, No clubbing, cyanosis, or edema  SKIN: warm dry                          13.3   9.35  )-----------( 193      ( 19 Jan 2025 05:22 )             39.0     01-19    137  |  101  |  15  ----------------------------<  91  2.9[LL]   |  27  |  0.42[L]    Ca    8.3[L]      19 Jan 2025 05:22  Phos  1.8     01-18  Mg     1.8     01-18    TPro  6.9  /  Alb  2.5[L]  /  TBili  1.8[H]  /  DBili  x   /  AST  25  /  ALT  9[L]  /  AlkPhos  74  01-18    LIVER FUNCTIONS - ( 18 Jan 2025 06:10 )  Alb: 2.5 g/dL / Pro: 6.9 g/dL / ALK PHOS: 74 U/L / ALT: 9 U/L DA / AST: 25 U/L / GGT: x                   I&O's Summary    18 Jan 2025 07:01  -  19 Jan 2025 07:00  --------------------------------------------------------  IN: 0 mL / OUT: 900 mL / NET: -900 mL          Urinalysis with Rflx Culture (collected 17 Jan 2025 06:08)    Culture - Blood (collected 17 Jan 2025 00:30)  Source: .Blood BLOOD  Preliminary Report (19 Jan 2025 06:01):    No growth at 48 Hours    Culture - Blood (collected 17 Jan 2025 00:20)  Source: .Blood BLOOD  Preliminary Report (19 Jan 2025 06:01):    No growth at 48 Hours        CAPILLARY BLOOD GLUCOSE      RADIOLOGY & ADDITIONAL TESTS:                   PGY-1 Progress Note discussed with attending    PLEASE MS TEAMS AUTHOR TILL 5:00 PM    PLEASE CONTACT ON CALL TEAM:  - On Call Team (Please refer to Letty) FROM 5:00 PM - 8:30PM  - Nightfloat Team FROM 8:30 -7:30 AM      INTERVAL HPI/OVERNIGHT EVENTS: No acute events overnight.    SUBJECTIVE: Patient seen and examined at bedside this morning. Feels fatigued. States that she does not remember the events leading to her hospitalization but that her neighbors and niece later explained to her. Later talked to Kerry, her niece and HCP, on the phone, who said that patient is intermittently confused when speaking to her which is not her baseline.    ---------------------------    REVIEW OF SYSTEMS:  CONSTITUTIONAL: No fever, weight loss, +fatigue  RESPIRATORY: No cough, wheezing, chills or hemoptysis; No shortness of breath  CARDIOVASCULAR: No chest pain, palpitations, dizziness, or leg swelling  GASTROINTESTINAL: No abdominal pain. No nausea, vomiting, or hematemesis; No diarrhea or constipation. No melena or hematochezia.  GENITOURINARY: No dysuria or hematuria, urinary frequency  NEUROLOGICAL: No headaches, memory loss, loss of strength, numbness, or tremors  SKIN: No itching, burning, rashes, or lesions     MEDICATIONS  (STANDING):  aspirin  chewable 81 milliGRAM(s) Oral daily  atorvastatin 40 milliGRAM(s) Oral daily  cefTRIAXone   IVPB 1000 milliGRAM(s) IV Intermittent every 24 hours  clopidogrel Tablet 75 milliGRAM(s) Oral daily  enoxaparin Injectable 40 milliGRAM(s) SubCutaneous every 24 hours  lactated ringers. 1000 milliLiter(s) (90 mL/Hr) IV Continuous <Continuous>    MEDICATIONS  (PRN):  acetaminophen     Tablet .. 650 milliGRAM(s) Oral every 6 hours PRN Temp greater or equal to 38C (100.4F), Mild Pain (1 - 3)  aluminum hydroxide/magnesium hydroxide/simethicone Suspension 30 milliLiter(s) Oral every 4 hours PRN Dyspepsia  melatonin 3 milliGRAM(s) Oral at bedtime PRN Insomnia  metoprolol tartrate Injectable 5 milliGRAM(s) IV Push every 6 hours PRN HR >120  ondansetron Injectable 4 milliGRAM(s) IV Push every 8 hours PRN Nausea and/or Vomiting      Vital Signs Last 24 Hrs  T(C): 36.5 (19 Jan 2025 08:00), Max: 36.7 (19 Jan 2025 04:57)  T(F): 97.7 (19 Jan 2025 08:00), Max: 98.1 (19 Jan 2025 04:57)  HR: 108 (19 Jan 2025 08:00) (87 - 114)  BP: 128/94 (19 Jan 2025 08:00) (107/69 - 141/87)  BP(mean): --  RR: 17 (19 Jan 2025 08:00) (17 - 18)  SpO2: 94% (19 Jan 2025 08:00) (91% - 99%)    Parameters below as of 19 Jan 2025 08:00  Patient On (Oxygen Delivery Method): room air        -----------------------------    PHYSICAL EXAMINATION:  GENERAL: NAD, elderly female lying in bed  HEAD:  Atraumatic, Normocephalic  EYES:  conjunctiva and sclera clear  NECK: Supple, No JVD  CHEST/LUNG: Clear to auscultation bilaterally; No rales, rhonchi, wheezing, or rubs  HEART: Regular rate and rhythm; No murmurs, rubs, or gallops  ABDOMEN: Soft, Nontender, Nondistended; Bowel sounds present, no pain or masses on palpation  NERVOUS SYSTEM:  Alert & Oriented X2, CN2-12 intact, 4/5 strength in all extremities  : voiding well  EXTREMITIES:  2+ Peripheral Pulses, No clubbing, cyanosis, or edema. +rheumatoid nodules on MCP joints  SKIN: warm dry                          13.3   9.35  )-----------( 193      ( 19 Jan 2025 05:22 )             39.0     01-19    137  |  101  |  15  ----------------------------<  91  2.9[LL]   |  27  |  0.42[L]    Ca    8.3[L]      19 Jan 2025 05:22  Phos  1.8     01-18  Mg     1.8     01-18    TPro  6.9  /  Alb  2.5[L]  /  TBili  1.8[H]  /  DBili  x   /  AST  25  /  ALT  9[L]  /  AlkPhos  74  01-18    LIVER FUNCTIONS - ( 18 Jan 2025 06:10 )  Alb: 2.5 g/dL / Pro: 6.9 g/dL / ALK PHOS: 74 U/L / ALT: 9 U/L DA / AST: 25 U/L / GGT: x                   I&O's Summary    18 Jan 2025 07:01  -  19 Jan 2025 07:00  --------------------------------------------------------  IN: 0 mL / OUT: 900 mL / NET: -900 mL          Urinalysis with Rflx Culture (collected 17 Jan 2025 06:08)    Culture - Blood (collected 17 Jan 2025 00:30)  Source: .Blood BLOOD  Preliminary Report (19 Jan 2025 06:01):    No growth at 48 Hours    Culture - Blood (collected 17 Jan 2025 00:20)  Source: .Blood BLOOD  Preliminary Report (19 Jan 2025 06:01):    No growth at 48 Hours        CAPILLARY BLOOD GLUCOSE      RADIOLOGY & ADDITIONAL TESTS:

## 2025-01-19 NOTE — PROGRESS NOTE ADULT - SUBJECTIVE AND OBJECTIVE BOX
PATIENT SEEN AND EXAMINED BY TAMI AN M.D. ON :- 1/19/25  DATE OF SERVICE:    1/19/25        Interim events noted,Labs ,Radiological studies and Cardiology tests reviewed .    Patient is a 89y old  Female who presents with a chief complaint of acute encephalopathy , subacute stroke (19 Jan 2025 09:46)      HPI:  89F from home walks with cane PMH of paroxysmal Afib, SVT, HLD, rheumatoid arthritis; Mycobacterium avium complex presented with acute mental status changes. Patient is AOx3 at baseline, living alone. Collateral obtained by niece. Patient was last seen 2 days ago and may have had a doctors appointment with Dr Rhodes, her PCP. Patient was found by her neighbor on the floor in her apartment.     (17 Jan 2025 06:49)      PAST MEDICAL & SURGICAL HISTORY:  Paroxysmal atrial fibrillation      HTN (hypertension)      JACINTA (mycobacterium avium-intracellulare)          PREVIOUS DIAGNOSTIC TESTING:      ECHO  FINDINGS:    STRESS  FINDINGS:    CATHETERIZATION  FINDINGS:    MEDICATIONS  (STANDING):  apixaban 2.5 milliGRAM(s) Oral every 12 hours  atorvastatin 40 milliGRAM(s) Oral daily  cefTRIAXone   IVPB 1000 milliGRAM(s) IV Intermittent every 24 hours    MEDICATIONS  (PRN):  acetaminophen     Tablet .. 650 milliGRAM(s) Oral every 6 hours PRN Temp greater or equal to 38C (100.4F), Mild Pain (1 - 3)  aluminum hydroxide/magnesium hydroxide/simethicone Suspension 30 milliLiter(s) Oral every 4 hours PRN Dyspepsia  melatonin 3 milliGRAM(s) Oral at bedtime PRN Insomnia  metoprolol tartrate Injectable 5 milliGRAM(s) IV Push every 6 hours PRN HR >120  ondansetron Injectable 4 milliGRAM(s) IV Push every 8 hours PRN Nausea and/or Vomiting      FAMILY HISTORY:      SOCIAL HISTORY:    CIGARETTES:    ALCOHOL:    REVIEW OF SYSTEMS:  CONSTITUTIONAL: No fever, weight loss, or fatigue  EYES: No eye pain, visual disturbances, or discharge  ENMT:  No difficulty hearing, tinnitus, vertigo; No sinus or throat pain  NECK: No pain or stiffness  RESPIRATORY: No cough, wheezing, chills or hemoptysis; No shortness of breath  CARDIOVASCULAR: No chest pain, palpitations, dizziness, or leg swelling  GASTROINTESTINAL: No abdominal or epigastric pain. No nausea, vomiting, or hematemesis; No diarrhea or constipation. No melena or hematochezia.  GENITOURINARY: No dysuria, frequency, hematuria, or incontinence  NEUROLOGICAL: No headaches, memory loss, loss of strength, numbness, or tremors  SKIN: No itching, burning, rashes, or lesions   LYMPH NODES: No enlarged glands  ENDOCRINE: No heat or cold intolerance; No hair loss  MUSCULOSKELETAL: No joint pain or swelling; No muscle, back, or extremity pain  PSYCHIATRIC: No depression, anxiety, mood swings, or difficulty sleeping  HEME/LYMPH: No easy bruising, or bleeding gums  ALLERY AND IMMUNOLOGIC: No hives or eczema    Vital Signs Last 24 Hrs  T(C): 36.6 (19 Jan 2025 15:20), Max: 36.7 (19 Jan 2025 04:57)  T(F): 97.9 (19 Jan 2025 15:20), Max: 98.1 (19 Jan 2025 04:57)  HR: 116 (19 Jan 2025 15:20) (87 - 116)  BP: 113/84 (19 Jan 2025 15:20) (104/61 - 130/82)  BP(mean): --  RR: 18 (19 Jan 2025 15:20) (17 - 18)  SpO2: 92% (19 Jan 2025 15:20) (92% - 99%)    Parameters below as of 19 Jan 2025 15:20  Patient On (Oxygen Delivery Method): room air          PHYSICAL EXAM:  GENERAL: NAD, well-groomed, well-developed  HEAD:  Atraumatic, Normocephalic  EYES: EOMI, PERRLA, conjunctiva and sclera clear  ENMT: No tonsillar erythema, exudates, or enlargement; Moist mucous membranes, Good dentition, No lesions  NECK: Supple, No JVD, Normal thyroid  NERVOUS SYSTEM:  Alert & Oriented X3, Good concentration; Motor Strength 5/5 B/L upper and lower extremities; DTRs 2+ intact and symmetric  CHEST/LUNG: Clear to percussion bilaterally; No rales, rhonchi, wheezing, or rubs  HEART: Regular rate and rhythm; No murmurs, rubs, or gallops  ABDOMEN: Soft, Nontender, Nondistended; Bowel sounds present  EXTREMITIES:  2+ Peripheral Pulses, No clubbing, cyanosis, or edema  LYMPH: No lymphadenopathy noted  SKIN: No rashes or lesions      INTERPRETATION OF TELEMETRY:    [facility]    [facility]     LABS:                        13.3   9.35  )-----------( 193      ( 19 Jan 2025 05:22 )             39.0     01-19    x   |  x   |  x   ----------------------------<  x   x    |  x   |  0.55    Ca    8.3[L]      19 Jan 2025 05:22  Phos  1.8     01-18  Mg     1.8     01-18    TPro  6.9  /  Alb  2.5[L]  /  TBili  1.8[H]  /  DBili  x   /  AST  25  /  ALT  9[L]  /  AlkPhos  74  01-18          Urinalysis Basic - ( 19 Jan 2025 05:22 )    Color: x / Appearance: x / SG: x / pH: x  Gluc: 91 mg/dL / Ketone: x  / Bili: x / Urobili: x   Blood: x / Protein: x / Nitrite: x   Leuk Esterase: x / RBC: x / WBC x   Sq Epi: x / Non Sq Epi: x / Bacteria: x      Lipid Panel:   I&O's Summary    18 Jan 2025 07:01  -  19 Jan 2025 07:00  --------------------------------------------------------  IN: 0 mL / OUT: 900 mL / NET: -900 mL        RADIOLOGY & ADDITIONAL STUDIES:    < from: TTE W or WO Ultrasound Enhancing Agent (01.17.25 @ 13:23) >  CONCLUSIONS:      1. Left ventricular systolic function is normal with an ejection fraction visually estimated at 55 to 60 %.   2. There is normal LV mass and normal geometry.   3. The right ventricle is not well visualized. probably enlarged right ventricular cavity size and normal right ventricular systolic function.   4. Agitated saline injection was negative for intracardiac shunt.   5. Estimated pulmonary artery systolic pressure is 62 mmHg, consistent with severe pulmonary hypertension.   6. Moderate to severe tricuspid regurgitation.    < end of copied text >

## 2025-01-20 DIAGNOSIS — K59.00 CONSTIPATION, UNSPECIFIED: ICD-10-CM

## 2025-01-20 PROCEDURE — 99233 SBSQ HOSP IP/OBS HIGH 50: CPT | Mod: GC

## 2025-01-20 PROCEDURE — 70551 MRI BRAIN STEM W/O DYE: CPT | Mod: 26

## 2025-01-20 RX ORDER — POLYETHYLENE GLYCOL 3350 17 G/17G
17 POWDER, FOR SOLUTION ORAL DAILY
Refills: 0 | Status: DISCONTINUED | OUTPATIENT
Start: 2025-01-20 | End: 2025-01-27

## 2025-01-20 RX ORDER — SENNOSIDES 8.6 MG
2 TABLET ORAL AT BEDTIME
Refills: 0 | Status: DISCONTINUED | OUTPATIENT
Start: 2025-01-20 | End: 2025-01-27

## 2025-01-20 RX ORDER — LACTULOSE 10 G/15 ML
10 SOLUTION, ORAL ORAL DAILY
Refills: 0 | Status: DISCONTINUED | OUTPATIENT
Start: 2025-01-20 | End: 2025-01-27

## 2025-01-20 RX ORDER — BISACODYL 5 MG
5 TABLET, DELAYED RELEASE (ENTERIC COATED) ORAL EVERY 12 HOURS
Refills: 0 | Status: DISCONTINUED | OUTPATIENT
Start: 2025-01-20 | End: 2025-01-21

## 2025-01-20 RX ADMIN — POLYETHYLENE GLYCOL 3350 17 GRAM(S): 17 POWDER, FOR SOLUTION ORAL at 12:03

## 2025-01-20 RX ADMIN — ACETAMINOPHEN, DIPHENHYDRAMINE HCL, PHENYLEPHRINE HCL 3 MILLIGRAM(S): 325; 25; 5 TABLET ORAL at 00:40

## 2025-01-20 RX ADMIN — ESCITALOPRAM 20 MILLIGRAM(S): 10 TABLET, FILM COATED ORAL at 12:02

## 2025-01-20 RX ADMIN — ATORVASTATIN CALCIUM 40 MILLIGRAM(S): 80 TABLET, FILM COATED ORAL at 22:01

## 2025-01-20 RX ADMIN — APIXABAN 2.5 MILLIGRAM(S): 5 TABLET, FILM COATED ORAL at 10:31

## 2025-01-20 RX ADMIN — Medication 5 MILLIGRAM(S): at 00:18

## 2025-01-20 RX ADMIN — Medication 25 MILLIGRAM(S): at 05:56

## 2025-01-20 RX ADMIN — APIXABAN 2.5 MILLIGRAM(S): 5 TABLET, FILM COATED ORAL at 22:01

## 2025-01-20 RX ADMIN — Medication 2 TABLET(S): at 22:01

## 2025-01-20 NOTE — PROGRESS NOTE ADULT - SUBJECTIVE AND OBJECTIVE BOX
PGY-1 Progress Note discussed with attending    PLEASE MS TEAMS AUTHOR TILL 5:00 PM    PLEASE CONTACT ON CALL TEAM:  - On Call Team (Please refer to Letty) FROM 5:00 PM - 8:30PM  - Nightfloat Team FROM 8:30 -7:30 AM      INTERVAL HPI/OVERNIGHT EVENTS: No acute events overnight.    SUBJECTIVE: Patient seen and examined at bedside this morning. AAOx2 this AM. No complaints.    ---------------------------    REVIEW OF SYSTEMS:  CONSTITUTIONAL: No fever, weight loss, or fatigue  RESPIRATORY: No cough, wheezing, chills or hemoptysis; No shortness of breath  CARDIOVASCULAR: No chest pain, palpitations, dizziness, or leg swelling  GASTROINTESTINAL: No abdominal pain. No nausea, vomiting, or hematemesis; No diarrhea or constipation. No melena or hematochezia.  GENITOURINARY: No dysuria or hematuria, urinary frequency  NEUROLOGICAL: No headaches, memory loss, loss of strength, numbness, or tremors  SKIN: No itching, burning, rashes, or lesions     MEDICATIONS  (STANDING):  apixaban 2.5 milliGRAM(s) Oral every 12 hours  atenolol  Tablet 25 milliGRAM(s) Oral daily  atorvastatin 40 milliGRAM(s) Oral daily  escitalopram 20 milliGRAM(s) Oral daily    MEDICATIONS  (PRN):  acetaminophen     Tablet .. 650 milliGRAM(s) Oral every 6 hours PRN Temp greater or equal to 38C (100.4F), Mild Pain (1 - 3)  albuterol    90 MICROgram(s) HFA Inhaler 2 Puff(s) Inhalation every 6 hours PRN for bronchospasm  aluminum hydroxide/magnesium hydroxide/simethicone Suspension 30 milliLiter(s) Oral every 4 hours PRN Dyspepsia  melatonin 3 milliGRAM(s) Oral at bedtime PRN Insomnia  metoprolol tartrate Injectable 5 milliGRAM(s) IV Push every 6 hours PRN HR >120  ondansetron Injectable 4 milliGRAM(s) IV Push every 8 hours PRN Nausea and/or Vomiting      Vital Signs Last 24 Hrs  T(C): 36.3 (20 Jan 2025 07:44), Max: 37.2 (19 Jan 2025 19:30)  T(F): 97.3 (20 Jan 2025 07:44), Max: 99 (19 Jan 2025 19:30)  HR: 105 (20 Jan 2025 07:44) (105 - 126)  BP: 135/93 (20 Jan 2025 07:44) (104/61 - 140/93)  BP(mean): 106 (20 Jan 2025 07:44) (106 - 106)  RR: 18 (20 Jan 2025 07:44) (17 - 18)  SpO2: 95% (20 Jan 2025 07:44) (92% - 97%)    Parameters below as of 20 Jan 2025 07:44  Patient On (Oxygen Delivery Method): room air        -----------------------------    PHYSICAL EXAMINATION:  GENERAL: NAD, elderly female lying in bed  HEAD:  Atraumatic, Normocephalic  EYES:  conjunctiva and sclera clear  NECK: Supple, No JVD  CHEST/LUNG: Clear to auscultation bilaterally; No rales, rhonchi, wheezing, or rubs  HEART: Regular rate and rhythm; No murmurs, rubs, or gallops  ABDOMEN: Soft, Nontender, Nondistended; Bowel sounds present, no pain or masses on palpation  NERVOUS SYSTEM:  Alert & Oriented X2, CN2-12 intact, 4/5 strength in all extremities  : voiding well  EXTREMITIES:  2+ Peripheral Pulses, No clubbing, cyanosis, or edema. +rheumatoid nodules on MCP joints  SKIN: warm dry                          13.3   9.35  )-----------( 193      ( 19 Jan 2025 05:22 )             39.0     01-19    137  |  103  |  18  ----------------------------<  102[H]  3.9   |  25  |  0.55    Ca    8.4      19 Jan 2025 16:55                I&O's Summary    19 Jan 2025 07:01  -  20 Jan 2025 07:00  --------------------------------------------------------  IN: 0 mL / OUT: 1000 mL / NET: -1000 mL            CAPILLARY BLOOD GLUCOSE      RADIOLOGY & ADDITIONAL TESTS:

## 2025-01-20 NOTE — PROGRESS NOTE ADULT - PROBLEM SELECTOR PLAN 5
hx of afib on verapamil 120 bid and atenolol 20 qd  not on AC    - c/w atenolol 20 qd  - start eliquis 2.5mg bid

## 2025-01-20 NOTE — PROGRESS NOTE ADULT - PROBLEM SELECTOR PLAN 3
CXR showing likely chronic changes on wet read  s/p _IVF and ceftriaxone in the ED  c/w ceftriaxone  BCx 1/17: NGTD 48h  UCx: none

## 2025-01-20 NOTE — PROGRESS NOTE ADULT - ASSESSMENT
89F with a PMH of paroxysmal Afib, SVT, HLD, rheumatoid arthritis; Mycobacterium avium complex admitted for acute encephalopathy and subacute CVA.

## 2025-01-20 NOTE — PROGRESS NOTE ADULT - ATTENDING COMMENTS
89 yo F w/ PMHx of Paroxysmal A-fib, SVT, HLD brought from home confused and altered after she was found down > 24 hours after she was last seen in her normal functioning state. Work up in the ED shows  hypoxic-ischemic encephalopathy likely related to possible subacute infarct in the right basal ganglia admitted for further management of possible acute stroke.     On bedside evaluation feeling well, patiently waiting for MRI, Discussed that she will need to continue the DOAC regardless of MRI results co-pay around $80, she is agreeable. Reports feeling constipation but also reported to another team member she had BM this AM, will check with RN.     Exam:  NAD   RRR  CTABL  abdo soft non tender   ext wwp   anox2 baseline no FND currently     Labs and Imaging reviewed:                         13.3   9.35  )-----------( 193      ( 19 Jan 2025 05:22 )             39.0   137  |  103  |  18  ----------------------------( 102[H]     01-19 @ 16:55  3.9   |  25  |  0.55    Ca: 8.4   Phos: x    Mg: x     TPro: x  / Alb: x  /  TBili x  / DBili x  /  AST x  /  ALT x  /  AlkPhos  x                       A/P:  #Acute vs Subacute infarct   #Afib intermittent RVR     - dc tele, f/u MRI  - no need for daily labs, K normal after repletion yesterday   - SLP evaluated, adjust diet per recs, c/w statin  - c/w eliquis 2.5 BID   - f/u TTE = EF 55-60% otherwise normal  - c/w miralax, senna, add bisacodyl and lactulose prn and use for goal of 1 BM per day   - PT evaluation = [facility] but pt would rather go home, f/u with CM/SW for safe dispo plan home pt/hha  - patient is agreeable to aid if possible and would greatly benefit from home health aid help

## 2025-01-20 NOTE — PROGRESS NOTE ADULT - SUBJECTIVE AND OBJECTIVE BOX
PATIENT SEEN AND EXAMINED BY TAMI AN M.D. ON :- 1/20/25  DATE OF SERVICE:      1/20/25       Interim events noted,Labs ,Radiological studies and Cardiology tests reviewed .    Patient is a 89y old  Female who presents with a chief complaint of acute encephalopathy , subacute stroke (20 Jan 2025 08:40)      HPI:  89F from home walks with cane PMH of paroxysmal Afib, SVT, HLD, rheumatoid arthritis; Mycobacterium avium complex presented with acute mental status changes. Patient is AOx3 at baseline, living alone. Collateral obtained by niece. Patient was last seen 2 days ago and may have had a doctors appointment with Dr Rhodes, her PCP. Patient was found by her neighbor on the floor in her apartment.     (17 Jan 2025 06:49)      PAST MEDICAL & SURGICAL HISTORY:  Paroxysmal atrial fibrillation      HTN (hypertension)      JACINTA (mycobacterium avium-intracellulare)          PREVIOUS DIAGNOSTIC TESTING:      ECHO  FINDINGS:    STRESS  FINDINGS:    CATHETERIZATION  FINDINGS:    MEDICATIONS  (STANDING):  apixaban 2.5 milliGRAM(s) Oral every 12 hours  atenolol  Tablet 25 milliGRAM(s) Oral daily  atorvastatin 40 milliGRAM(s) Oral daily  escitalopram 20 milliGRAM(s) Oral daily  polyethylene glycol 3350 17 Gram(s) Oral daily  senna 2 Tablet(s) Oral at bedtime    MEDICATIONS  (PRN):  acetaminophen     Tablet .. 650 milliGRAM(s) Oral every 6 hours PRN Temp greater or equal to 38C (100.4F), Mild Pain (1 - 3)  albuterol    90 MICROgram(s) HFA Inhaler 2 Puff(s) Inhalation every 6 hours PRN for bronchospasm  aluminum hydroxide/magnesium hydroxide/simethicone Suspension 30 milliLiter(s) Oral every 4 hours PRN Dyspepsia  bisacodyl 5 milliGRAM(s) Oral every 12 hours PRN Constipation  lactulose Syrup 10 Gram(s) Oral daily PRN constipation  melatonin 3 milliGRAM(s) Oral at bedtime PRN Insomnia  metoprolol tartrate Injectable 5 milliGRAM(s) IV Push every 6 hours PRN HR >120  ondansetron Injectable 4 milliGRAM(s) IV Push every 8 hours PRN Nausea and/or Vomiting      FAMILY HISTORY:      SOCIAL HISTORY:    CIGARETTES:    ALCOHOL:    REVIEW OF SYSTEMS:  CONSTITUTIONAL: No fever, weight loss, or fatigue  EYES: No eye pain, visual disturbances, or discharge  ENMT:  No difficulty hearing, tinnitus, vertigo; No sinus or throat pain  NECK: No pain or stiffness  RESPIRATORY: No cough, wheezing, chills or hemoptysis; No shortness of breath  CARDIOVASCULAR: No chest pain, palpitations, dizziness, or leg swelling  GASTROINTESTINAL: No abdominal or epigastric pain. No nausea, vomiting, or hematemesis; No diarrhea or constipation. No melena or hematochezia.  GENITOURINARY: No dysuria, frequency, hematuria, or incontinence  NEUROLOGICAL: No headaches, memory loss, loss of strength, numbness, or tremors  SKIN: No itching, burning, rashes, or lesions   LYMPH NODES: No enlarged glands  ENDOCRINE: No heat or cold intolerance; No hair loss  MUSCULOSKELETAL: No joint pain or swelling; No muscle, back, or extremity pain  PSYCHIATRIC: No depression, anxiety, mood swings, or difficulty sleeping  HEME/LYMPH: No easy bruising, or bleeding gums  ALLERY AND IMMUNOLOGIC: No hives or eczema    Vital Signs Last 24 Hrs  T(C): 36.1 (20 Jan 2025 15:45), Max: 37.2 (19 Jan 2025 19:30)  T(F): 97 (20 Jan 2025 15:45), Max: 99 (19 Jan 2025 19:30)  HR: 92 (20 Jan 2025 15:45) (92 - 126)  BP: 113/90 (20 Jan 2025 15:45) (113/90 - 140/93)  BP(mean): 106 (20 Jan 2025 07:44) (106 - 106)  RR: 17 (20 Jan 2025 15:45) (17 - 18)  SpO2: 93% (20 Jan 2025 15:45) (92% - 96%)    Parameters below as of 20 Jan 2025 15:45  Patient On (Oxygen Delivery Method): room air          PHYSICAL EXAM:  GENERAL: NAD, well-groomed, well-developed  HEAD:  Atraumatic, Normocephalic  EYES: EOMI, PERRLA, conjunctiva and sclera clear  ENMT: No tonsillar erythema, exudates, or enlargement; Moist mucous membranes, Good dentition, No lesions  NECK: Supple, No JVD, Normal thyroid  NERVOUS SYSTEM:  Alert & Oriented X3, Good concentration; Motor Strength 5/5 B/L upper and lower extremities; DTRs 2+ intact and symmetric  CHEST/LUNG: Clear to percussion bilaterally; No rales, rhonchi, wheezing, or rubs  HEART: Regular rate and rhythm; No murmurs, rubs, or gallops  ABDOMEN: Soft, Nontender, Nondistended; Bowel sounds present  EXTREMITIES:  2+ Peripheral Pulses, No clubbing, cyanosis, or edema  LYMPH: No lymphadenopathy noted  SKIN: No rashes or lesions      INTERPRETATION OF TELEMETRY:    ECG:    MARIODSVASC:     LABS:                        13.3   9.35  )-----------( 193      ( 19 Jan 2025 05:22 )             39.0     01-19    137  |  103  |  18  ----------------------------<  102[H]  3.9   |  25  |  0.55    Ca    8.4      19 Jan 2025 16:55            Urinalysis Basic - ( 19 Jan 2025 16:55 )    Color: x / Appearance: x / SG: x / pH: x  Gluc: 102 mg/dL / Ketone: x  / Bili: x / Urobili: x   Blood: x / Protein: x / Nitrite: x   Leuk Esterase: x / RBC: x / WBC x   Sq Epi: x / Non Sq Epi: x / Bacteria: x      Lipid Panel:   I&O's Summary    19 Jan 2025 07:01  -  20 Jan 2025 07:00  --------------------------------------------------------  IN: 0 mL / OUT: 1000 mL / NET: -1000 mL        RADIOLOGY & ADDITIONAL STUDIES:    < from: TTE W or WO Ultrasound Enhancing Agent (01.17.25 @ 13:23) >     CONCLUSIONS:      1. Left ventricular systolic function is normal with an ejection fraction visually estimated at 55 to 60 %.   2. There is normal LV mass and normal geometry.   3. The right ventricle is not well visualized. probably enlarged right ventricular cavity size and normal right ventricular systolic function.   4. Agitated saline injection was negative for intracardiac shunt.   5. Estimated pulmonary artery systolic pressure is 62 mmHg, consistent with severe pulmonary hypertension.   6. Moderate to severe tricuspid regurgitation.    < end of copied text >

## 2025-01-20 NOTE — PROGRESS NOTE ADULT - PROBLEM SELECTOR PLAN 2
patient presenting with sided deficits  CTH showing subacute infarct  EKG showed tachy w RBBB  out of window for TNK  aspirin suppository  lipid panel: chol 57, tg 58, hdl 24, ldl 19  A1c 5.8  f/u TTE  f/u MRI  q4 neuro checks  aspiration precautions  initial dysphagia screen failed, passed 2nd dysphagia screen  f/u speech and swallow  Neuro Dr. Longo following

## 2025-01-21 ENCOUNTER — TRANSCRIPTION ENCOUNTER (OUTPATIENT)
Age: 89
End: 2025-01-21

## 2025-01-21 PROCEDURE — 99232 SBSQ HOSP IP/OBS MODERATE 35: CPT | Mod: GC

## 2025-01-21 RX ORDER — ATENOLOL 50 MG
50 TABLET ORAL DAILY
Refills: 0 | Status: DISCONTINUED | OUTPATIENT
Start: 2025-01-21 | End: 2025-01-27

## 2025-01-21 RX ORDER — ATORVASTATIN CALCIUM 80 MG/1
1 TABLET, FILM COATED ORAL
Qty: 30 | Refills: 0
Start: 2025-01-21 | End: 2025-02-19

## 2025-01-21 RX ORDER — BISACODYL 5 MG
5 TABLET, DELAYED RELEASE (ENTERIC COATED) ORAL AT BEDTIME
Refills: 0 | Status: DISCONTINUED | OUTPATIENT
Start: 2025-01-21 | End: 2025-01-27

## 2025-01-21 RX ADMIN — POLYETHYLENE GLYCOL 3350 17 GRAM(S): 17 POWDER, FOR SOLUTION ORAL at 11:54

## 2025-01-21 RX ADMIN — ATORVASTATIN CALCIUM 40 MILLIGRAM(S): 80 TABLET, FILM COATED ORAL at 21:24

## 2025-01-21 RX ADMIN — ACETAMINOPHEN, DIPHENHYDRAMINE HCL, PHENYLEPHRINE HCL 3 MILLIGRAM(S): 325; 25; 5 TABLET ORAL at 21:24

## 2025-01-21 RX ADMIN — Medication 2 TABLET(S): at 21:25

## 2025-01-21 RX ADMIN — ESCITALOPRAM 20 MILLIGRAM(S): 10 TABLET, FILM COATED ORAL at 11:54

## 2025-01-21 RX ADMIN — APIXABAN 2.5 MILLIGRAM(S): 5 TABLET, FILM COATED ORAL at 09:59

## 2025-01-21 RX ADMIN — APIXABAN 2.5 MILLIGRAM(S): 5 TABLET, FILM COATED ORAL at 21:24

## 2025-01-21 RX ADMIN — Medication 25 MILLIGRAM(S): at 05:22

## 2025-01-21 NOTE — PROGRESS NOTE ADULT - PROBLEM SELECTOR PLAN 3
CXR showing likely chronic changes on wet read  s/p _IVF and ceftriaxone in the ED  c/w ceftriaxone  BCx 1/17: NGTD 48h  UCx: none CXR showing likely chronic changes on wet read  s/p _IVF and ceftriaxone in the ED  BCx 1/17: NGTD 48h  UCx: none  s/p course of CTX

## 2025-01-21 NOTE — PROGRESS NOTE ADULT - SUBJECTIVE AND OBJECTIVE BOX
PGY-1 Progress Note discussed with attending    PLEASE MS TEAMS AUTHOR TILL 5:00 PM    PLEASE CONTACT ON CALL TEAM:  - On Call Team (Please refer to Letty) FROM 5:00 PM - 8:30PM  - Nightfloat Team FROM 8:30 -7:30 AM      INTERVAL HPI/OVERNIGHT EVENTS: No acute events overnight.    SUBJECTIVE: Patient seen and examined at bedside this morning. No complaints. Does not have much of an appetite. Now agreeable to MADDI.    ---------------------------    REVIEW OF SYSTEMS:  CONSTITUTIONAL: No fever, weight loss, or fatigue  RESPIRATORY: No cough, wheezing, chills or hemoptysis; No shortness of breath  CARDIOVASCULAR: No chest pain, palpitations, dizziness, or leg swelling  GASTROINTESTINAL: No abdominal pain. No nausea, vomiting, or hematemesis; No diarrhea or constipation. No melena or hematochezia.  GENITOURINARY: No dysuria or hematuria, urinary frequency  NEUROLOGICAL: No headaches, memory loss, loss of strength, numbness, or tremors  SKIN: No itching, burning, rashes, or lesions     MEDICATIONS  (STANDING):  apixaban 2.5 milliGRAM(s) Oral every 12 hours  atenolol  Tablet 25 milliGRAM(s) Oral daily  atorvastatin 40 milliGRAM(s) Oral daily  escitalopram 20 milliGRAM(s) Oral daily  polyethylene glycol 3350 17 Gram(s) Oral daily  senna 2 Tablet(s) Oral at bedtime    MEDICATIONS  (PRN):  acetaminophen     Tablet .. 650 milliGRAM(s) Oral every 6 hours PRN Temp greater or equal to 38C (100.4F), Mild Pain (1 - 3)  albuterol    90 MICROgram(s) HFA Inhaler 2 Puff(s) Inhalation every 6 hours PRN for bronchospasm  aluminum hydroxide/magnesium hydroxide/simethicone Suspension 30 milliLiter(s) Oral every 4 hours PRN Dyspepsia  bisacodyl 5 milliGRAM(s) Oral every 12 hours PRN Constipation  lactulose Syrup 10 Gram(s) Oral daily PRN constipation  melatonin 3 milliGRAM(s) Oral at bedtime PRN Insomnia  metoprolol tartrate Injectable 5 milliGRAM(s) IV Push every 6 hours PRN HR >120  ondansetron Injectable 4 milliGRAM(s) IV Push every 8 hours PRN Nausea and/or Vomiting      Vital Signs Last 24 Hrs  T(C): 37.2 (21 Jan 2025 07:43), Max: 37.2 (21 Jan 2025 07:43)  T(F): 99 (21 Jan 2025 07:43), Max: 99 (21 Jan 2025 07:43)  HR: 90 (21 Jan 2025 07:43) (89 - 111)  BP: 122/93 (21 Jan 2025 07:43) (113/90 - 138/88)  BP(mean): 101 (21 Jan 2025 07:43) (101 - 101)  RR: 18 (21 Jan 2025 07:43) (17 - 18)  SpO2: 95% (21 Jan 2025 07:43) (92% - 96%)    Parameters below as of 21 Jan 2025 07:43  Patient On (Oxygen Delivery Method): room air        -----------------------------    PHYSICAL EXAMINATION:  GENERAL: NAD, elderly female lying in bed  HEAD:  Atraumatic, Normocephalic  EYES:  conjunctiva and sclera clear  NECK: Supple, No JVD  CHEST/LUNG: Clear to auscultation bilaterally; No rales, rhonchi, wheezing, or rubs  HEART: Regular rate and rhythm; No murmurs, rubs, or gallops  ABDOMEN: Soft, Nontender, Nondistended; Bowel sounds present, no pain or masses on palpation  NERVOUS SYSTEM:  Alert & Oriented X2, CN2-12 intact, 4/5 strength in all extremities  : voiding well  EXTREMITIES:  2+ Peripheral Pulses, No clubbing, cyanosis, or edema. +rheumatoid nodules on MCP joints  SKIN: warm dry      01-19    137  |  103  |  18  ----------------------------<  102[H]  3.9   |  25  |  0.55    Ca    8.4      19 Jan 2025 16:55                I&O's Summary    20 Jan 2025 07:01  -  21 Jan 2025 07:00  --------------------------------------------------------  IN: 0 mL / OUT: 700 mL / NET: -700 mL            CAPILLARY BLOOD GLUCOSE      RADIOLOGY & ADDITIONAL TESTS:

## 2025-01-21 NOTE — DISCHARGE NOTE PROVIDER - NSDCMRMEDTOKEN_GEN_ALL_CORE_FT
Albuterol (Eqv-ProAir HFA) 90 mcg/inh inhalation aerosol: 2 puff(s) inhaled every 6 hours as needed for  bronchospasm  apixaban 2.5 mg oral tablet: 1 tab(s) orally 2 times a day  atenolol 25 mg oral tablet: 1 tab(s) orally once a day  Lexapro 20 mg oral tablet: 1 tab(s) orally once a day  simvastatin 10 mg oral tablet: 1 tab(s) orally once a day (at bedtime)  verapamil 120 mg oral tablet: 1 tab(s) orally 2 times a day  Xeljanz XR 11 mg oral tablet, extended release: 1 tab(s) orally once a day   Albuterol (Eqv-ProAir HFA) 90 mcg/inh inhalation aerosol: 2 puff(s) inhaled every 6 hours as needed for  bronchospasm  apixaban 2.5 mg oral tablet: 1 tab(s) orally 2 times a day  atenolol 25 mg oral tablet: 1 tab(s) orally once a day  atorvastatin 40 mg oral tablet: 1 tab(s) orally once a day  Lexapro 20 mg oral tablet: 1 tab(s) orally once a day  verapamil 120 mg oral tablet: 1 tab(s) orally 2 times a day  Xeljanz XR 11 mg oral tablet, extended release: 1 tab(s) orally once a day   Albuterol (Eqv-ProAir HFA) 90 mcg/inh inhalation aerosol: 2 puff(s) inhaled every 6 hours as needed for  bronchospasm  apixaban 2.5 mg oral tablet: 1 tab(s) orally 2 times a day  aspirin 81 mg oral tablet: 1 tab(s) orally once a day  Aspirin Enteric Coated 81 mg oral delayed release tablet: 1 tab(s) orally once a day  atenolol 50 mg oral tablet: 1 tab(s) orally once a day  atorvastatin 40 mg oral tablet: 1 tab(s) orally once a day  Flovent  mcg/inh inhalation aerosol: 2 puff(s) inhaled 2 times a day, As Needed  folic acid 0.4 mg oral tablet: 1 tab(s) orally once a day  Lexapro 20 mg oral tablet: 1 tab(s) orally once a day  verapamil 120 mg oral tablet: 1 tab(s) orally 2 times a day  verapamil 120 mg oral tablet: 1 tab(s) orally 2 times a day  Xeljanz XR 11 mg oral tablet, extended release: 1 tab(s) orally once a day  Xeljanz XR 11 mg oral tablet, extended release: 1 tab(s) orally once a day   Albuterol (Eqv-ProAir HFA) 90 mcg/inh inhalation aerosol: 2 puff(s) inhaled every 6 hours as needed for  bronchospasm  apixaban 2.5 mg oral tablet: 1 tab(s) orally 2 times a day  atenolol 50 mg oral tablet: 1 tab(s) orally once a day  atorvastatin 40 mg oral tablet: 1 tab(s) orally once a day  Lexapro 20 mg oral tablet: 1 tab(s) orally once a day

## 2025-01-21 NOTE — PROGRESS NOTE ADULT - PROBLEM SELECTOR PLAN 2
patient presenting with sided deficits  CTH showing subacute infarct  EKG showed tachy w RBBB  out of window for TNK  aspirin suppository  lipid panel: chol 57, tg 58, hdl 24, ldl 19  A1c 5.8  f/u TTE  f/u MRI  q4 neuro checks  aspiration precautions  initial dysphagia screen failed, passed 2nd dysphagia screen  f/u speech and swallow  Neuro Dr. Longo following patient found on the floor at home  CTH showing subacute infarct  Patient meeting SIRS criteria HR>90 and WBC>13k  2/2 CVA vs infectious  s/p ASA suppository  neuro Dr. Longo following    RESOLVED

## 2025-01-21 NOTE — PROGRESS NOTE ADULT - PROBLEM SELECTOR PLAN 4
Has not had a BM for a few days    - start miralax and senna Has not had a BM for a few days    - c/w miralax and senna

## 2025-01-21 NOTE — PROGRESS NOTE ADULT - SUBJECTIVE AND OBJECTIVE BOX
PATIENT SEEN AND EXAMINED BY TAMI AN M.D. ON :- 1/21/25  DATE OF SERVICE:     1/21/25        Interim events noted,Labs ,Radiological studies and Cardiology tests reviewed .    Patient is a 89y old  Female who presents with a chief complaint of acute encephalopathy , subacute stroke (21 Jan 2025 16:22)      HPI:  89F from home walks with cane PMH of paroxysmal Afib, SVT, HLD, rheumatoid arthritis; Mycobacterium avium complex presented with acute mental status changes. Patient is AOx3 at baseline, living alone. Collateral obtained by niece. Patient was last seen 2 days ago and may have had a doctors appointment with Dr Rhodes, her PCP. Patient was found by her neighbor on the floor in her apartment.     (17 Jan 2025 06:49)      PAST MEDICAL & SURGICAL HISTORY:  Paroxysmal atrial fibrillation      HTN (hypertension)      JACINTA (mycobacterium avium-intracellulare)          PREVIOUS DIAGNOSTIC TESTING:      ECHO  FINDINGS:    STRESS  FINDINGS:    CATHETERIZATION  FINDINGS:    MEDICATIONS  (STANDING):  apixaban 2.5 milliGRAM(s) Oral every 12 hours  atenolol  Tablet 50 milliGRAM(s) Oral daily  atorvastatin 40 milliGRAM(s) Oral daily  bisacodyl 5 milliGRAM(s) Oral at bedtime  escitalopram 20 milliGRAM(s) Oral daily  polyethylene glycol 3350 17 Gram(s) Oral daily  senna 2 Tablet(s) Oral at bedtime    MEDICATIONS  (PRN):  acetaminophen     Tablet .. 650 milliGRAM(s) Oral every 6 hours PRN Temp greater or equal to 38C (100.4F), Mild Pain (1 - 3)  albuterol    90 MICROgram(s) HFA Inhaler 2 Puff(s) Inhalation every 6 hours PRN for bronchospasm  aluminum hydroxide/magnesium hydroxide/simethicone Suspension 30 milliLiter(s) Oral every 4 hours PRN Dyspepsia  lactulose Syrup 10 Gram(s) Oral daily PRN constipation  melatonin 3 milliGRAM(s) Oral at bedtime PRN Insomnia  ondansetron Injectable 4 milliGRAM(s) IV Push every 8 hours PRN Nausea and/or Vomiting      FAMILY HISTORY:      SOCIAL HISTORY:    CIGARETTES:    ALCOHOL:    REVIEW OF SYSTEMS:  CONSTITUTIONAL: No fever, weight loss, or fatigue  EYES: No eye pain, visual disturbances, or discharge  ENMT:  No difficulty hearing, tinnitus, vertigo; No sinus or throat pain  NECK: No pain or stiffness  RESPIRATORY: No cough, wheezing, chills or hemoptysis; No shortness of breath  CARDIOVASCULAR: No chest pain, palpitations, dizziness, or leg swelling  GASTROINTESTINAL: No abdominal or epigastric pain. No nausea, vomiting, or hematemesis; No diarrhea or constipation. No melena or hematochezia.  GENITOURINARY: No dysuria, frequency, hematuria, or incontinence  NEUROLOGICAL: No headaches, memory loss, loss of strength, numbness, or tremors  SKIN: No itching, burning, rashes, or lesions   LYMPH NODES: No enlarged glands  ENDOCRINE: No heat or cold intolerance; No hair loss  MUSCULOSKELETAL: No joint pain or swelling; No muscle, back, or extremity pain  PSYCHIATRIC: No depression, anxiety, mood swings, or difficulty sleeping  HEME/LYMPH: No easy bruising, or bleeding gums  ALLERY AND IMMUNOLOGIC: No hives or eczema    Vital Signs Last 24 Hrs  T(C): 36.4 (21 Jan 2025 15:49), Max: 37.2 (21 Jan 2025 07:43)  T(F): 97.5 (21 Jan 2025 15:49), Max: 99 (21 Jan 2025 07:43)  HR: 102 (21 Jan 2025 15:49) (86 - 111)  BP: 105/67 (21 Jan 2025 15:49) (99/68 - 138/88)  BP(mean): 83 (21 Jan 2025 11:40) (79 - 101)  RR: 18 (21 Jan 2025 15:49) (17 - 19)  SpO2: 95% (21 Jan 2025 15:49) (92% - 96%)    Parameters below as of 21 Jan 2025 15:49  Patient On (Oxygen Delivery Method): room air          PHYSICAL EXAM:  GENERAL: NAD, well-groomed, well-developed  HEAD:  Atraumatic, Normocephalic  EYES: EOMI, PERRLA, conjunctiva and sclera clear  ENMT: No tonsillar erythema, exudates, or enlargement; Moist mucous membranes, Good dentition, No lesions  NECK: Supple, No JVD, Normal thyroid  NERVOUS SYSTEM:  Alert & Oriented X3, Good concentration; Motor Strength 5/5 B/L upper and lower extremities; DTRs 2+ intact and symmetric  CHEST/LUNG: Clear to percussion bilaterally; No rales, rhonchi, wheezing, or rubs  HEART: Regular rate and rhythm; No murmurs, rubs, or gallops  ABDOMEN: Soft, Nontender, Nondistended; Bowel sounds present  EXTREMITIES:  2+ Peripheral Pulses, No clubbing, cyanosis, or edema  LYMPH: No lymphadenopathy noted  SKIN: No rashes or lesions      INTERPRETATION OF TELEMETRY:    [facility]    [facility]     LABS:                  Lipid Panel:   I&O's Summary    20 Jan 2025 07:01  -  21 Jan 2025 07:00  --------------------------------------------------------  IN: 0 mL / OUT: 700 mL / NET: -700 mL        RADIOLOGY & ADDITIONAL STUDIES:    < from: TTE W or WO Ultrasound Enhancing Agent (01.17.25 @ 13:23) >  CONCLUSIONS:      1. Left ventricular systolic function is normal with an ejection fraction visually estimated at 55 to 60 %.   2. There is normal LV mass and normal geometry.   3. The right ventricle is not well visualized. probably enlarged right ventricular cavity size and normal right ventricular systolic function.   4. Agitated saline injection was negative for intracardiac shunt.   5. Estimated pulmonary artery systolic pressure is 62 mmHg, consistent with severe pulmonary hypertension.   6. Moderate to severe tricuspid regurgitation.    < end of copied text >

## 2025-01-21 NOTE — PROGRESS NOTE ADULT - PROBLEM SELECTOR PLAN 5
hx of afib on verapamil 120 bid and atenolol 20 qd  not on AC    - c/w atenolol 20 qd  - start eliquis 2.5mg bid hx of afib on verapamil 120 bid and atenolol 20 qd  not on AC    - increase atenolol to 50mg qd  - start eliquis 2.5mg bid

## 2025-01-21 NOTE — PROGRESS NOTE ADULT - CONVERSATION DETAILS
Patient's niece, Kerry Slade, contacted through the phone. An extensive goals of care conversation was held including options, risks and benefits of many medical treatments including CPR, intubation, and tube feeding. Patient's living will provided by Kerry. Patient to continue to be DNR/DNI. Patient's niece Kerry confirmed to be HCP. Papers placed in chart.

## 2025-01-21 NOTE — PROGRESS NOTE ADULT - PROBLEM SELECTOR PLAN 1
patient found on the floor at home  CTH showing subacute infarct  Patient meeting SIRS criteria HR>90 and WBC>13k  2/2 CVA vs infectious  s/p ASA suppository  neuro Dr. Longo following patient presenting with sided deficits  CTH showing subacute infarct  EKG showed tachy w RBBB  out of window for TNK  aspirin suppository  lipid panel: chol 57, tg 58, hdl 24, ldl 19  A1c 5.8  TTE: LVEF 55-60%, severe pHTN, mod-severe TR, neg PFO  MRI: acute infarct in R basal ganglia/anterior corona radiata, subacute medial L occipital/inferior parietal region, L frontal extra-axial lesion, possibly meningioma  q4 neuro checks  aspiration precautions  initial dysphagia screen failed, passed 2nd dysphagia screen, SnS rec regular diet  Neuro Dr. Longo following

## 2025-01-21 NOTE — DISCHARGE NOTE PROVIDER - DETAILS OF MALNUTRITION DIAGNOSIS/DIAGNOSES
This patient has been assessed with a concern for Malnutrition and was treated during this hospitalization for the following Nutrition diagnosis/diagnoses:     -  01/24/2025: Severe protein-calorie malnutrition   72.6

## 2025-01-21 NOTE — DISCHARGE NOTE PROVIDER - HOSPITAL COURSE
89F with a PMH of paroxysmal Afib, SVT, HLD, rheumatoid arthritis; Mycobacterium avium complex admitted for acute encephalopathy and subacute CVA. Patient is a 89 year old female with a PMH of HLD, paroxysmal Afib, SVT, rheumatoid arthritis, and Mycobacterium avium complex admitted for acute encephalopathy and subacute CVA.  Patient had EKG which showed sinus tachycardia with RBB.  patient had CT HEad which showed subacute infarct.  Patient was out of window for TNK or antithrombolic.  Patient was treated with aspirin suppository in the ED and admitted to tele.  Patient was monitored on tele monitor and no acute arrythmia were noted.  Patient had TTE which did not show PFO.  Patient had MR Head showing acute infarct.  Patient already on Full dose AC with Eliquis.  Patient was evaluated by Neurology and recommendations were followed.  Patient was evaluated by PT who recommend Acute rehab.  Patient able to tolerated and participate in up to 3 hours of rehab daily, and would benifit from AR for PT and OT.    Patient also found to meet SIRS sepsis criteria and was treated with a course of CTX.      Patient is able to ambulate and tolerate diet prior to discharge. Patient is stable for discharge per attending and is advised to follow up with PCP as outpatient. Patient is a 89 year old female with a PMH of HLD, paroxysmal Afib, SVT, rheumatoid arthritis, and Mycobacterium avium complex admitted for acute encephalopathy and subacute CVA.  Patient had EKG which showed sinus tachycardia with RBB.  patient had CT HEad which showed subacute infarct.  Patient was out of window for TNK or antithrombolic.  Patient was treated with aspirin suppository in the ED and admitted to tele.  Patient was monitored on tele monitor and no acute arrythmia were noted.  Patient had TTE which did not show PFO.  Patient had MR Head showing acute infarct.  Patient already on Full dose AC with Eliquis.  Patient was evaluated by Neurology and recommendations were followed.  Patient was evaluated by PT who recommend Acute rehab.  Patient able to tolerated and participate in up to 3 hours of rehab daily, and would benifit from AR for PT and OT.    Patient also found to meet SIRS sepsis criteria completed course of antibiotics workup remained negative for acute infection.    Discharge discussed with attending.   Patient is stable for discharge per attending and is advised to follow up with PCP as outpatient.

## 2025-01-21 NOTE — DISCHARGE NOTE PROVIDER - NSDCCPCAREPLAN_GEN_ALL_CORE_FT
PRINCIPAL DISCHARGE DIAGNOSIS  Diagnosis: CVA (cerebrovascular accident)  Assessment and Plan of Treatment: You were diagnosed with CVA cerebro vascular disease.   CT head showed subacute infarct, and later your MRI brain showed acute infarct.  Your EKG showed sinus tachycardia with a right bundle branch block, likely normal for you.  Your Echocardiogram showed normal pumping function of the heart and no significant valvular abnormalities without a hole connecting your Right to left side of your heart (You did not have a PFO).   You were seen by Neurologist who recommends treatment with ATORVASTATIN AND CONTINUE YOUR ELIQUIS. You were seen by physical therapist who [facility].  Please take medications as prescribed and follow up with your PCP and Neurologist in 1 week from discharge for further recommendations.      SECONDARY DISCHARGE DIAGNOSES  Diagnosis: Atrial fibrillation with rapid ventricular response  Assessment and Plan of Treatment: You have history of atrial fibrillation.   Please continue to take medications as prescribed.   Please follow up with your PCP and Cardiologist in 1 week from discharge    Diagnosis: Sepsis  Assessment and Plan of Treatment: You were found to meet SIRS Sepsis criteria with an elevated WBC > 12 and an elevated Heart rate.  You were treated with a course of antibiotics, Ceftriaxone, and your labs and vitals improved.  Your septic workup did not reveal acute infecitons.  Please follow up with your PCP within 1 week.    Diagnosis: Mycobacterium avium complex  Assessment and Plan of Treatment: You have a history of MAC.     PRINCIPAL DISCHARGE DIAGNOSIS  Diagnosis: CVA (cerebrovascular accident)  Assessment and Plan of Treatment: You were diagnosed with an acute stroke in hospital.    CT head showed subacute infarct, and later your MRI brain showed acute infarct in right basal ganglia and corona radiata. Your EKG showed sinus tachycardia with a right bundle branch block, likely at baseline.  Your Echocardiogram showed normal pumping function of the heart and no significant valvular abnormalities without any congenital connection your Right to left side of your heart (You did not have a PFO).   You were seen by Neurologist who recommends treatment with ATORVASTATIN AND CONTINUE YOUR ELIQUIS. You were seen by physical therapist who [facility] for daily physical therapy and occupational therapy to improve gait endurance.   Please take medications as prescribed and follow up with your PCP and Neurologist in 1 week from discharge for further recommendations.      SECONDARY DISCHARGE DIAGNOSES  Diagnosis: Sepsis  Assessment and Plan of Treatment: You were found to meet SIRS Sepsis criteria with an elevated WBC > 12 and an elevated Heart rate on admisison.  You were treated with a course of antibiotics, Ceftriaxone, and your labs and vitals improved.  Your septic workup did not reveal acute infecitons.  Please follow up with your PCP within 1 week of discharge from rehab    Diagnosis: Atrial fibrillation with rapid ventricular response  Assessment and Plan of Treatment: You have history of atrial fibrillation.   Please continue to take blood thinner APIXABAN (ELIQUIS) to prevent further stroke  Please follow up with your PCP and Cardiologist in 1 week from discharge    Diagnosis: Mycobacterium avium complex  Assessment and Plan of Treatment: You have a history of MAC. You remain asymptomatic. Conservative management and observation for now.

## 2025-01-21 NOTE — PROGRESS NOTE ADULT - ASSESSMENT
89F with a PMH of paroxysmal Afib, SVT, HLD, rheumatoid arthritis; Mycobacterium avium complex admitted for acute encephalopathy and subacute CVA.        #  Paroxysmal atrial fibrillation.   ·  Plan: hx of afib not on AC, patient did not want to be on AC  BB    TTE Left ventricular systolic function is normal with an ejection fraction visually estimated at 55 to 60 %.    # CVA (cerebrovascular accident).   ·  Plan: patient presenting with sided deficits  CTH showing subacute infarct  EKG showed tachy w RBBB  out of window for TNK

## 2025-01-21 NOTE — DISCHARGE NOTE PROVIDER - NSDCCAREPROVSEEN_GEN_ALL_CORE_FT
Donta, Dayami Bah, Adolfo Aceves, Rasheeda Canales, Alan Carpenter, Paul Estrada, Carlos Murrieta, Renetta Sheth, Prince Bravo, Ajit Álvarez, Mavis Villela, Kris Noguera, Olivia Euceda, Melani Tolbert, [facility]

## 2025-01-21 NOTE — DISCHARGE NOTE PROVIDER - ATTENDING DISCHARGE PHYSICAL EXAMINATION:
Patient admitted with acute encephalopathy noted to have acute stroke and possible UTI  Hx A. Fib placed on new anticoagulation; tolerated well; On statin  Patient is OOB to chair; eating well    P/E:  Psych: AAO x3  Neuro: No gross focal deficits; Power and sensation intact  CVS: S1S2 present, regular, no edema  Resp: BLAE+, No wheeze or Rhonchi  GI: Soft, BS+, Non tender, non distended  Extr: No  calf tenderness B/L Lower extremities    Plan:  Patient accepted to [facility] last week; has bed assigned today  Medically stable  See d/c instructions  Discussed with BYRON Benson and RN   Patient admitted with acute encephalopathy noted to have acute stroke and possible UTI  Hx A. Fib placed on new anticoagulation; tolerated well; On statin  Patient is OOB to chair; eating well    P/E:  Psych: AAO x3  Neuro: No gross focal deficits; Power and sensation intact  CVS: S1S2 present, regular, no edema  Resp: BLAE+, No wheeze or Rhonchi  GI: Soft, BS+, Non tender, non distended  Extr: No  calf tenderness B/L Lower extremities    Plan:  Patient accepted to [facility] last week; has bed assigned today  Medically stable  See d/c instructions  Discussed with BYRON Benson and RN  Discussed with Jonathan and updated through course of hospitalization

## 2025-01-21 NOTE — PROGRESS NOTE ADULT - ATTENDING COMMENTS
Patient seen and exmained this afternoon      89 yo F w/ PMHx of Paroxysmal A-fib, SVT, HLD brought from home confused and altered after she was found down > 24 hours after she was last seen in her normal functioning state. Work up in the ED shows  hypoxic-ischemic encephalopathy likely related to possible subacute infarct in the right basal ganglia admitted for further management of possible acute stroke.     Patient is OOB to chair; NAD; wishes to go home.     Exam:  NAD   RRR  CTABL  abdo soft non tender   ext wwp   anox2 baseline no FND currently     Labs and Imaging reviewed: no new labs last 2 days will get AM    mr< from: MR Head No Cont (01.20.25 @ 14:17) >    This exam is compared with prior head CT performed on January 16, 2025    Parenchymal volume loss and chronic microvessel ischemic changes are   identified.    There is abnormalT2 prolongation with restricted diffusion seen involving the right basal ganglia/anterior coronal radiata region. This is compatible with an acute infarct. No hemorrhagic transformation is seen. No significant shift or herniation is seen.  Abnormal T2 prolongation is seen involving the medial left occipital/inferior parietal cortex. These findings do demonstrate mild increased signal on the diffusion weighted sequence as well as minimal decreased signal on the ADC mapping. These findings could be compatible with subacute infarcts. No hemorrhagic transformation is seen. No significant shift or herniation is seen.    Left frontal extra-axial lesion is seen (7-16) this finding measures approximately 0.6 cm and could be compatible with a small meningioma.   Contrast enhanced MRI of the brain can be done for further evaluation.  The large vessels demonstrate normal flow voids.  The paranasal sinuses appear clear  Inflammatory change involving both mastoid regions leftgreater than right.  Patient is status post bilateral cataract surgery.    IMPRESSION: Infarcts as described above.                  A/P:  #Acute vs Subacute infarct   #Afib intermittent RVR     PT reval appreciated; still recommend [facility] which will also provide OT in this healthy 89 year old     - SLP evaluated, adjust diet per recs, c/w statin  - c/w eliquis 2.5 BID   - f/u TTE = EF 55-60% otherwise normal  - c/w miralax, senna, add bisacodyl and lactulose prn and use for goal of 1 BM per day   - PT evaluation = MADDI but pt would rather go home, f/u with CM/SW for safe dispo plan home pt/hha  - patient is agreeable to aid if possible and would greatly benefit from home health aid help Patient seen and examined this afternoon    89 yo F with PMHx of Paroxysmal A-fib, SVT, HLD brought from home confused and altered after she was found down on floor > 24 hours after she was last seen in her normal functioning state. Work up in the ED shows  hypoxic-ischemic encephalopathy likely related to possible subacute infarct in the right basal ganglia admitted for further management of possible acute stroke. MRI showing a new acute stroke in addition to subacute infarct in the setting of PAF. Patient refused anticoagulation     Patient is OOB to chair; NAD; wishes to go home.  Patient was seen by PT this afternoon and recommended [facility] for PT/OT    Vital Signs Last 24 Hrs  T(C): 36.4 (21 Jan 2025 15:49), Max: 37.2 (21 Jan 2025 07:43)  T(F): 97.5 (21 Jan 2025 15:49), Max: 99 (21 Jan 2025 07:43)  HR: 102 (21 Jan 2025 15:49) (86 - 111)  BP: 105/67 (21 Jan 2025 15:49) (99/68 - 138/88)  BP(mean): 83 (21 Jan 2025 11:40) (79 - 101)  RR: 18 (21 Jan 2025 15:49) (17 - 19)  SpO2: 95% (21 Jan 2025 15:49) (92% - 96%): room air    P/E:  Psych: AAO x3  Neuro: No gross focal deficits; Power and sensation intact  CVS: S1S2 present, regular, no edema  Resp: BLAE+, No wheeze or Rhonchi  GI: Soft, BS+, Non tender, non distended  Extr: No  calf tenderness B/L Lower extremities  Skin: Warm and moist without any rashes    Labs and Imaging reviewed: no new labs last 2 days will get AM    MR Head No Cont (01.20.25 @ 14:17) >    This exam is compared with prior head CT performed on January 16, 2025    Parenchymal volume loss and chronic microvessel ischemic changes are identified.    There is abnormalT2 prolongation with restricted diffusion seen involving the right basal ganglia/anterior coronal radiata region. This is compatible with an acute infarct. No hemorrhagic transformation is seen. No significant shift or herniation is seen. Abnormal T2 prolongation is seen involving the medial left occipital/inferior parietal cortex. These findings do demonstrate mild increased signal on the diffusion weighted sequence as well as minimal decreased signal on the ADC mapping. These findings could be compatible with subacute infarcts. No hemorrhagic transformation is seen. No significant shift or herniation is seen.    Left frontal extra-axial lesion is seen (7-16) this finding measures approximately 0.6 cm and could be compatible with a small meningioma.   Contrast enhanced MRI of the brain can be done for further evaluation.  The large vessels demonstrate normal flow voids. The paranasal sinuses appear clear  Inflammatory change involving both mastoid regions left greater than right.  Patient is status post bilateral cataract surgery.    IMPRESSION: Infarcts as described above.        A/P:  #Acute CVA Right basal ganglia and Corona Radiata   Subacute infarct left occipital/ inferior parietal cortex  #A. fib intermittent RVR     Plan:  PT re-eval appreciated; still recommend [facility] which will also provide OT in this healthy 89 year old     - SLP evaluated, adjust diet per recs, c/w statin  - c/w eliquis 2.5 BID   - f/u TTE = EF 55-60% otherwise normal  - c/w miralax, senna, add bisacodyl and lactulose prn and use for goal of 1 BM per day   - PT evaluation = MADDI but pt would rather go home, f/u with CM/SW for safe dispo plan home pt/hha  - patient is agreeable to aid if possible and would greatly benefit from home health aid help Patient seen and examined this afternoon    89 yo F with PMHx of Paroxysmal A-fib, SVT, HLD brought from home confused and altered after she was found down on floor > 24 hours after she was last seen in her normal functioning state. Work up in the ED shows  hypoxic-ischemic encephalopathy likely related to possible subacute infarct in the right basal ganglia admitted for further management of possible acute stroke. MRI showing a new acute stroke in addition to subacute infarct in the setting of PAF. Patient initially refused anticoagulation but has now agreed    Patient is OOB to chair; NAD; wishes to go home.  Patient was seen by PT this afternoon and recommended [facility] for PT/OT    Vital Signs Last 24 Hrs  T(C): 36.4 (21 Jan 2025 15:49), Max: 37.2 (21 Jan 2025 07:43)  T(F): 97.5 (21 Jan 2025 15:49), Max: 99 (21 Jan 2025 07:43)  HR: 102 (21 Jan 2025 15:49) (86 - 111)  BP: 105/67 (21 Jan 2025 15:49) (99/68 - 138/88)  BP(mean): 83 (21 Jan 2025 11:40) (79 - 101)  RR: 18 (21 Jan 2025 15:49) (17 - 19)  SpO2: 95% (21 Jan 2025 15:49) (92% - 96%): room air    P/E:  Psych: AAO x3; stable mood  Neuro: No gross focal deficits; Power and sensation intact  CVS: S1S2 present, regular, no edema  Resp: BLAE+, No wheeze or Rhonchi  GI: Soft, BS+, Non tender, non distended  Extr: No  calf tenderness B/L Lower extremities  Skin: Warm and moist without any rashes    Labs and Imaging reviewed: no new labs last 2 days will get AM    MR Head No Cont (01.20.25 @ 14:17) >    This exam is compared with prior head CT performed on January 16, 2025    Parenchymal volume loss and chronic microvessel ischemic changes are identified.    There is abnormalT2 prolongation with restricted diffusion seen involving the right basal ganglia/anterior coronal radiata region. This is compatible with an acute infarct. No hemorrhagic transformation is seen. No significant shift or herniation is seen. Abnormal T2 prolongation is seen involving the medial left occipital/inferior parietal cortex. These findings do demonstrate mild increased signal on the diffusion weighted sequence as well as minimal decreased signal on the ADC mapping. These findings could be compatible with subacute infarcts. No hemorrhagic transformation is seen. No significant shift or herniation is seen.    Left frontal extra-axial lesion is seen (7-16) this finding measures approximately 0.6 cm and could be compatible with a small meningioma.   Contrast enhanced MRI of the brain can be done for further evaluation.  The large vessels demonstrate normal flow voids. The paranasal sinuses appear clear  Inflammatory change involving both mastoid regions left greater than right.  Patient is status post bilateral cataract surgery.    IMPRESSION: Infarcts as described above.        A/P:  #Acute CVA Right basal ganglia and Corona Radiata   Subacute infarct left occipital/ inferior parietal cortex  #A. fib intermittent RVR   #Hx Depression with Anxiety    Plan:  PT re-eval appreciated; still recommend [facility] which will also provide OT in this healthy 89 year old    reached out to Daughter; [facility] choice provided; as per  does not need Auth  No dysphagia; regular dit  Continue Eliquis and Statin; stroke in multiple areas concerning for embolic stroke in the setting of underlying A. Fib  TTE = EF 55-60% otherwise normal  Continue bowel regimen: Miralax, senna, add bisacodyl and lactulose prn and use for goal of 1 to 2 BM per day   Rest as per PGY1 above  Discussed with Patient  Discussed with Housestaff, PT,  and RN; medically stable for Rehab

## 2025-01-22 LAB
ANION GAP SERPL CALC-SCNC: 8 MMOL/L — SIGNIFICANT CHANGE UP (ref 5–17)
BUN SERPL-MCNC: 15 MG/DL — SIGNIFICANT CHANGE UP (ref 7–18)
CALCIUM SERPL-MCNC: 8.6 MG/DL — SIGNIFICANT CHANGE UP (ref 8.4–10.5)
CHLORIDE SERPL-SCNC: 103 MMOL/L — SIGNIFICANT CHANGE UP (ref 96–108)
CO2 SERPL-SCNC: 25 MMOL/L — SIGNIFICANT CHANGE UP (ref 22–31)
CREAT SERPL-MCNC: 0.52 MG/DL — SIGNIFICANT CHANGE UP (ref 0.5–1.3)
CULTURE RESULTS: SIGNIFICANT CHANGE UP
CULTURE RESULTS: SIGNIFICANT CHANGE UP
EGFR: 87 ML/MIN/1.73M2 — SIGNIFICANT CHANGE UP
GLUCOSE SERPL-MCNC: 92 MG/DL — SIGNIFICANT CHANGE UP (ref 70–99)
HCT VFR BLD CALC: 39.8 % — SIGNIFICANT CHANGE UP (ref 34.5–45)
HGB BLD-MCNC: 13.2 G/DL — SIGNIFICANT CHANGE UP (ref 11.5–15.5)
MAGNESIUM SERPL-MCNC: 1.8 MG/DL — SIGNIFICANT CHANGE UP (ref 1.6–2.6)
MCHC RBC-ENTMCNC: 32.2 PG — SIGNIFICANT CHANGE UP (ref 27–34)
MCHC RBC-ENTMCNC: 33.2 G/DL — SIGNIFICANT CHANGE UP (ref 32–36)
MCV RBC AUTO: 97.1 FL — SIGNIFICANT CHANGE UP (ref 80–100)
NRBC # BLD: 0 /100 WBCS — SIGNIFICANT CHANGE UP (ref 0–0)
NRBC BLD-RTO: 0 /100 WBCS — SIGNIFICANT CHANGE UP (ref 0–0)
PHOSPHATE SERPL-MCNC: 3.8 MG/DL — SIGNIFICANT CHANGE UP (ref 2.5–4.5)
PLATELET # BLD AUTO: 220 K/UL — SIGNIFICANT CHANGE UP (ref 150–400)
POTASSIUM SERPL-MCNC: 3.7 MMOL/L — SIGNIFICANT CHANGE UP (ref 3.5–5.3)
POTASSIUM SERPL-SCNC: 3.7 MMOL/L — SIGNIFICANT CHANGE UP (ref 3.5–5.3)
RBC # BLD: 4.1 M/UL — SIGNIFICANT CHANGE UP (ref 3.8–5.2)
RBC # FLD: 14 % — SIGNIFICANT CHANGE UP (ref 10.3–14.5)
SODIUM SERPL-SCNC: 136 MMOL/L — SIGNIFICANT CHANGE UP (ref 135–145)
SPECIMEN SOURCE: SIGNIFICANT CHANGE UP
SPECIMEN SOURCE: SIGNIFICANT CHANGE UP
WBC # BLD: 7.08 K/UL — SIGNIFICANT CHANGE UP (ref 3.8–10.5)
WBC # FLD AUTO: 7.08 K/UL — SIGNIFICANT CHANGE UP (ref 3.8–10.5)

## 2025-01-22 PROCEDURE — 99232 SBSQ HOSP IP/OBS MODERATE 35: CPT | Mod: GC

## 2025-01-22 RX ADMIN — Medication 2 TABLET(S): at 22:23

## 2025-01-22 RX ADMIN — ESCITALOPRAM 20 MILLIGRAM(S): 10 TABLET, FILM COATED ORAL at 12:25

## 2025-01-22 RX ADMIN — Medication 50 MILLIGRAM(S): at 05:37

## 2025-01-22 RX ADMIN — Medication 5 MILLIGRAM(S): at 22:23

## 2025-01-22 RX ADMIN — POLYETHYLENE GLYCOL 3350 17 GRAM(S): 17 POWDER, FOR SOLUTION ORAL at 12:25

## 2025-01-22 RX ADMIN — ACETAMINOPHEN, DIPHENHYDRAMINE HCL, PHENYLEPHRINE HCL 3 MILLIGRAM(S): 325; 25; 5 TABLET ORAL at 22:24

## 2025-01-22 RX ADMIN — APIXABAN 2.5 MILLIGRAM(S): 5 TABLET, FILM COATED ORAL at 22:23

## 2025-01-22 RX ADMIN — ATORVASTATIN CALCIUM 40 MILLIGRAM(S): 80 TABLET, FILM COATED ORAL at 22:23

## 2025-01-22 RX ADMIN — APIXABAN 2.5 MILLIGRAM(S): 5 TABLET, FILM COATED ORAL at 10:51

## 2025-01-22 NOTE — PROGRESS NOTE ADULT - PROBLEM SELECTOR PLAN 2
patient found on the floor at home  CTH showing subacute infarct  Patient meeting SIRS criteria HR>90 and WBC>13k  2/2 CVA vs infectious  s/p ASA suppository  neuro Dr. Longo following    RESOLVED

## 2025-01-22 NOTE — PROGRESS NOTE ADULT - ATTENDING COMMENTS
Patient seen and examined this afternoon    89 yo F with PMH of Paroxysmal A-fib, SVT, HLD brought from home confused and altered after she was found down on floor > 24 hours after she was last seen in her normal functioning state. Work up in the ED shows  hypoxic-ischemic encephalopathy likely related to possible subacute infarct in the right basal ganglia admitted for further management of possible acute stroke. MRI showing a new acute stroke in addition to subacute infarct in the setting of PAF. Patient initially refused anticoagulation but has now agreed    Patient is doing okay; comfortable in bed; NAD; Patient was seen by PT yesterday afternoon and recommended [facility] for PT/OT; daughter in agreement    Vital Signs Last 24 Hrs  T(C): 36.5 (22 Jan 2025 07:50), Max: 36.6 (21 Jan 2025 23:54)  T(F): 97.7 (22 Jan 2025 07:50), Max: 97.9 (22 Jan 2025 04:58)  HR: 72 (22 Jan 2025 07:50) (72 - 86)  BP: 115/66 (22 Jan 2025 07:50) (112/65 - 119/72)  RR: 17 (22 Jan 2025 07:50) (17 - 19)  SpO2: 96% (22 Jan 2025 07:50) (96% - 96%) room air    P/E:  Psych: AAO x3; stable mood  Neuro: No gross new focal deficits; Power and sensation intact  CVS: S1S2 present, regular, no edema  Resp: BLAE+, No wheeze or Rhonchi  GI: Soft, BS+, Non tender, non distended  Extr: No  calf tenderness B/L Lower extremities  Skin: Warm and moist without any rashes    Labs and Imaging reviewed:                        13.2   7.08  )-----------( 220      ( 22 Jan 2025 05:43 )             39.8     01-22  136  |  103  |  15  ----------------------------<  92  3.7   |  25  |  0.52  Ca    8.6      22 Jan 2025 05:43  Phos  3.8     01-22  Mg     1.8     01-22      MR Head No Cont (01.20.25 @ 14:17) >    This exam is compared with prior head CT performed on January 16, 2025  Parenchymal volume loss and chronic microvessel ischemic changes are identified.    There is abnormalT2 prolongation with restricted diffusion seen involving the right basal ganglia/anterior coronal radiata region. This is compatible with an acute infarct. No hemorrhagic transformation is seen. No significant shift or herniation is seen. Abnormal T2 prolongation is seen involving the medial left occipital/inferior parietal cortex. These findings do demonstrate mild increased signal on the diffusion weighted sequence as well as minimal decreased signal on the ADC mapping. These findings could be compatible with subacute infarcts. No hemorrhagic transformation is seen. No significant shift or herniation is seen.    Left frontal extra-axial lesion is seen (7-16) this finding measures approximately 0.6 cm and could be compatible with a small meningioma.   Contrast enhanced MRI of the brain can be done for further evaluation.  The large vessels demonstrate normal flow voids. The paranasal sinuses appear clear  Inflammatory change involving both mastoid regions left greater than right.  Patient is status post bilateral cataract surgery.    IMPRESSION: Infarcts as described above.        A/P:  #Acute CVA Right basal ganglia and Corona Radiata   Subacute infarct left occipital/ inferior parietal cortex  #A. fib intermittent RVR   #Hx Depression with Anxiety    Plan:  PT re-eval appreciated; still recommend Acute [facility]hab which will also provide OT in this healthy 89 year old    reached out to Daughter; [facility] choice provided; as per  does not need Auth  No dysphagia; regular diet tolerated  Continue Eliquis and Statin; stroke in multiple areas concerning for embolic stroke in the setting of underlying A. Fib  TTE = EF 55-60% otherwise normal  Continue bowel regimen: Miralax, senna, add bisacodyl and lactulose prn and use for goal of 1 to 2 BM per day   Labs stable; no eed to repeat daily  Rest as per PGY1 above  Discussed with Patient  Discussed with Housestaff, PT,  and RN; medically stable for Rehab  Discussed with Daughter Haydee this evening and reviewed; agrees with Acute Rehab; pending acceptance to [facility]

## 2025-01-22 NOTE — PROGRESS NOTE ADULT - PROBLEM SELECTOR PLAN 1
X Size Of Lesion In Cm (Optional): 2 Detail Level: Detailed patient presenting with sided deficits  CTH showing subacute infarct  EKG showed tachy w RBBB  out of window for TNK  aspirin suppository  lipid panel: chol 57, tg 58, hdl 24, ldl 19  A1c 5.8  TTE: LVEF 55-60%, severe pHTN, mod-severe TR, neg PFO  MRI: acute infarct in R basal ganglia/anterior corona radiata, subacute medial L occipital/inferior parietal region, L frontal extra-axial lesion, possibly meningioma  q4 neuro checks  aspiration precautions  initial dysphagia screen failed, passed 2nd dysphagia screen, SnS rec regular diet  Neuro Dr. Longo following

## 2025-01-22 NOTE — PROGRESS NOTE ADULT - SUBJECTIVE AND OBJECTIVE BOX
PGY-1 Progress Note discussed with attending    PLEASE MS TEAMS AUTHOR TILL 5:00 PM    PLEASE CONTACT ON CALL TEAM:  - On Call Team (Please refer to Letty) FROM 5:00 PM - 8:30PM  - Nightfloat Team FROM 8:30 -7:30 AM      INTERVAL HPI/OVERNIGHT EVENTS: No acute events overnight.    SUBJECTIVE: Patient seen and examined at bedside this morning. No complaints this AM. AAOx2. Has not had much of an appetite.    ---------------------------    REVIEW OF SYSTEMS:  CONSTITUTIONAL: No fever, weight loss, or fatigue. +poor appetite  RESPIRATORY: No cough, wheezing, chills or hemoptysis; No shortness of breath  CARDIOVASCULAR: No chest pain, palpitations, dizziness, or leg swelling  GASTROINTESTINAL: No abdominal pain. No nausea, vomiting, or hematemesis; No diarrhea or constipation. No melena or hematochezia.  GENITOURINARY: No dysuria or hematuria, urinary frequency  NEUROLOGICAL: No headaches, memory loss, loss of strength, numbness, or tremors  SKIN: No itching, burning, rashes, or lesions     MEDICATIONS  (STANDING):  apixaban 2.5 milliGRAM(s) Oral every 12 hours  atenolol  Tablet 50 milliGRAM(s) Oral daily  atorvastatin 40 milliGRAM(s) Oral daily  bisacodyl 5 milliGRAM(s) Oral at bedtime  escitalopram 20 milliGRAM(s) Oral daily  polyethylene glycol 3350 17 Gram(s) Oral daily  senna 2 Tablet(s) Oral at bedtime    MEDICATIONS  (PRN):  acetaminophen     Tablet .. 650 milliGRAM(s) Oral every 6 hours PRN Temp greater or equal to 38C (100.4F), Mild Pain (1 - 3)  albuterol    90 MICROgram(s) HFA Inhaler 2 Puff(s) Inhalation every 6 hours PRN for bronchospasm  aluminum hydroxide/magnesium hydroxide/simethicone Suspension 30 milliLiter(s) Oral every 4 hours PRN Dyspepsia  lactulose Syrup 10 Gram(s) Oral daily PRN constipation  melatonin 3 milliGRAM(s) Oral at bedtime PRN Insomnia  ondansetron Injectable 4 milliGRAM(s) IV Push every 8 hours PRN Nausea and/or Vomiting      Vital Signs Last 24 Hrs  T(C): 36.5 (22 Jan 2025 07:50), Max: 36.6 (21 Jan 2025 23:54)  T(F): 97.7 (22 Jan 2025 07:50), Max: 97.9 (22 Jan 2025 04:58)  HR: 72 (22 Jan 2025 07:50) (72 - 86)  BP: 115/66 (22 Jan 2025 07:50) (112/65 - 119/72)  BP(mean): --  RR: 17 (22 Jan 2025 07:50) (17 - 19)  SpO2: 96% (22 Jan 2025 07:50) (96% - 96%)    Parameters below as of 22 Jan 2025 07:50  Patient On (Oxygen Delivery Method): room air        -----------------------------    PHYSICAL EXAMINATION:  GENERAL: NAD, elderly female lying in bed  HEAD:  Atraumatic, Normocephalic  EYES:  conjunctiva and sclera clear  NECK: Supple, No JVD  CHEST/LUNG: Clear to auscultation bilaterally; No rales, rhonchi, wheezing, or rubs  HEART: Regular rate and rhythm; No murmurs, rubs, or gallops  ABDOMEN: Soft, Nontender, Nondistended; Bowel sounds present, no pain or masses on palpation  NERVOUS SYSTEM:  Alert & Oriented X2, CN2-12 intact, 4/5 strength in all extremities  : voiding well  EXTREMITIES:  2+ Peripheral Pulses, No clubbing, cyanosis, or edema. +rheumatoid nodules on MCP joints  SKIN: warm dry                          13.2   7.08  )-----------( 220      ( 22 Jan 2025 05:43 )             39.8     01-22    136  |  103  |  15  ----------------------------<  92  3.7   |  25  |  0.52    Ca    8.6      22 Jan 2025 05:43  Phos  3.8     01-22  Mg     1.8     01-22                I&O's Summary    21 Jan 2025 07:01  -  22 Jan 2025 07:00  --------------------------------------------------------  IN: 0 mL / OUT: 1300 mL / NET: -1300 mL            CAPILLARY BLOOD GLUCOSE      RADIOLOGY & ADDITIONAL TESTS:

## 2025-01-22 NOTE — PROGRESS NOTE ADULT - PROBLEM SELECTOR PLAN 3
CXR showing likely chronic changes on wet read  s/p _IVF and ceftriaxone in the ED  BCx 1/17: NGTD 48h  UCx: none  s/p course of CTX

## 2025-01-22 NOTE — PROGRESS NOTE ADULT - SUBJECTIVE AND OBJECTIVE BOX
PATIENT SEEN AND EXAMINED BY TAMI AN M.D. ON :- 1/22/25  DATE OF SERVICE:         1/22/25    Interim events noted,Labs ,Radiological studies and Cardiology tests reviewed .    Patient is a 89y old  Female who presents with a chief complaint of acute encephalopathy , subacute stroke (22 Jan 2025 07:16)      HPI:  89F from home walks with cane PMH of paroxysmal Afib, SVT, HLD, rheumatoid arthritis; Mycobacterium avium complex presented with acute mental status changes. Patient is AOx3 at baseline, living alone. Collateral obtained by niece. Patient was last seen 2 days ago and may have had a doctors appointment with Dr Rhodes, her PCP. Patient was found by her neighbor on the floor in her apartment.     (17 Jan 2025 06:49)      PAST MEDICAL & SURGICAL HISTORY:  Paroxysmal atrial fibrillation      HTN (hypertension)      JACINTA (mycobacterium avium-intracellulare)          PREVIOUS DIAGNOSTIC TESTING:      ECHO  FINDINGS:    STRESS  FINDINGS:    CATHETERIZATION  FINDINGS:    MEDICATIONS  (STANDING):  apixaban 2.5 milliGRAM(s) Oral every 12 hours  atenolol  Tablet 50 milliGRAM(s) Oral daily  atorvastatin 40 milliGRAM(s) Oral daily  bisacodyl 5 milliGRAM(s) Oral at bedtime  escitalopram 20 milliGRAM(s) Oral daily  polyethylene glycol 3350 17 Gram(s) Oral daily  senna 2 Tablet(s) Oral at bedtime    MEDICATIONS  (PRN):  acetaminophen     Tablet .. 650 milliGRAM(s) Oral every 6 hours PRN Temp greater or equal to 38C (100.4F), Mild Pain (1 - 3)  albuterol    90 MICROgram(s) HFA Inhaler 2 Puff(s) Inhalation every 6 hours PRN for bronchospasm  aluminum hydroxide/magnesium hydroxide/simethicone Suspension 30 milliLiter(s) Oral every 4 hours PRN Dyspepsia  lactulose Syrup 10 Gram(s) Oral daily PRN constipation  melatonin 3 milliGRAM(s) Oral at bedtime PRN Insomnia  ondansetron Injectable 4 milliGRAM(s) IV Push every 8 hours PRN Nausea and/or Vomiting      FAMILY HISTORY:      SOCIAL HISTORY:    CIGARETTES:    ALCOHOL:    REVIEW OF SYSTEMS:  CONSTITUTIONAL: No fever, weight loss, or fatigue  EYES: No eye pain, visual disturbances, or discharge  ENMT:  No difficulty hearing, tinnitus, vertigo; No sinus or throat pain  NECK: No pain or stiffness  RESPIRATORY: No cough, wheezing, chills or hemoptysis; No shortness of breath  CARDIOVASCULAR: No chest pain, palpitations, dizziness, or leg swelling  GASTROINTESTINAL: No abdominal or epigastric pain. No nausea, vomiting, or hematemesis; No diarrhea or constipation. No melena or hematochezia.  GENITOURINARY: No dysuria, frequency, hematuria, or incontinence  NEUROLOGICAL: No headaches, memory loss, loss of strength, numbness, or tremors  SKIN: No itching, burning, rashes, or lesions   LYMPH NODES: No enlarged glands  ENDOCRINE: No heat or cold intolerance; No hair loss  MUSCULOSKELETAL: No joint pain or swelling; No muscle, back, or extremity pain  PSYCHIATRIC: No depression, anxiety, mood swings, or difficulty sleeping  HEME/LYMPH: No easy bruising, or bleeding gums  ALLERY AND IMMUNOLOGIC: No hives or eczema    Vital Signs Last 24 Hrs  T(C): 36.6 (22 Jan 2025 19:57), Max: 36.6 (21 Jan 2025 23:54)  T(F): 97.9 (22 Jan 2025 19:57), Max: 97.9 (22 Jan 2025 04:58)  HR: 89 (22 Jan 2025 19:57) (62 - 89)  BP: 102/70 (22 Jan 2025 19:57) (102/70 - 119/72)  BP(mean): --  RR: 18 (22 Jan 2025 19:57) (17 - 19)  SpO2: 92% (22 Jan 2025 19:57) (92% - 96%)    Parameters below as of 22 Jan 2025 19:57  Patient On (Oxygen Delivery Method): room air          PHYSICAL EXAM:  GENERAL: NAD, well-groomed, well-developed  HEAD:  Atraumatic, Normocephalic  EYES: EOMI, PERRLA, conjunctiva and sclera clear  ENMT: No tonsillar erythema, exudates, or enlargement; Moist mucous membranes, Good dentition, No lesions  NECK: Supple, No JVD, Normal thyroid  NERVOUS SYSTEM:  Alert & Oriented X3, Good concentration; Motor Strength 5/5 B/L upper and lower extremities; DTRs 2+ intact and symmetric  CHEST/LUNG: Clear to percussion bilaterally; No rales, rhonchi, wheezing, or rubs  HEART: Regular rate and rhythm; No murmurs, rubs, or gallops  ABDOMEN: Soft, Nontender, Nondistended; Bowel sounds present  EXTREMITIES:  2+ Peripheral Pulses, No clubbing, cyanosis, or edema  LYMPH: No lymphadenopathy noted  SKIN: No rashes or lesions      INTERPRETATION OF TELEMETRY:    [facility]    [facility]     LABS:                        13.2   7.08  )-----------( 220      ( 22 Jan 2025 05:43 )             39.8     01-22    136  |  103  |  15  ----------------------------<  92  3.7   |  25  |  0.52    Ca    8.6      22 Jan 2025 05:43  Phos  3.8     01-22  Mg     1.8     01-22            Urinalysis Basic - ( 22 Jan 2025 05:43 )    Color: x / Appearance: x / SG: x / pH: x  Gluc: 92 mg/dL / Ketone: x  / Bili: x / Urobili: x   Blood: x / Protein: x / Nitrite: x   Leuk Esterase: x / RBC: x / WBC x   Sq Epi: x / Non Sq Epi: x / Bacteria: x      Lipid Panel:   I&O's Summary    21 Jan 2025 07:01  -  22 Jan 2025 07:00  --------------------------------------------------------  IN: 0 mL / OUT: 1300 mL / NET: -1300 mL        RADIOLOGY & ADDITIONAL STUDIES:    < from: TTE W or WO Ultrasound Enhancing Agent (01.17.25 @ 13:23) >  CONCLUSIONS:      1. Left ventricular systolic function is normal with an ejection fraction visually estimated at 55 to 60 %.   2. There is normal LV mass and normal geometry.   3. The right ventricle is not well visualized. probably enlarged right ventricular cavity size and normal right ventricular systolic function.   4. Agitated saline injection was negative for intracardiac shunt.   5. Estimated pulmonary artery systolic pressure is 62 mmHg, consistent with severe pulmonary hypertension.   6. Moderate to severe tricuspid regurgitation.    < end of copied text >

## 2025-01-22 NOTE — PROGRESS NOTE ADULT - PROBLEM SELECTOR PLAN 5
hx of afib on verapamil 120 bid and atenolol 20 qd  not on AC    - increase atenolol to 50mg qd  - start eliquis 2.5mg bid

## 2025-01-23 PROCEDURE — 99232 SBSQ HOSP IP/OBS MODERATE 35: CPT

## 2025-01-23 PROCEDURE — 99232 SBSQ HOSP IP/OBS MODERATE 35: CPT | Mod: GC

## 2025-01-23 RX ORDER — VERAPAMIL HCL 120 MG
1 TABLET, EXTENDED RELEASE ORAL
Refills: 0 | DISCHARGE

## 2025-01-23 RX ORDER — SIMVASTATIN 20 MG
1 TABLET ORAL
Refills: 0 | DISCHARGE

## 2025-01-23 RX ADMIN — POLYETHYLENE GLYCOL 3350 17 GRAM(S): 17 POWDER, FOR SOLUTION ORAL at 13:39

## 2025-01-23 RX ADMIN — Medication 2 PUFF(S): at 13:39

## 2025-01-23 RX ADMIN — APIXABAN 2.5 MILLIGRAM(S): 5 TABLET, FILM COATED ORAL at 09:43

## 2025-01-23 RX ADMIN — Medication 50 MILLIGRAM(S): at 06:14

## 2025-01-23 RX ADMIN — APIXABAN 2.5 MILLIGRAM(S): 5 TABLET, FILM COATED ORAL at 22:51

## 2025-01-23 RX ADMIN — ESCITALOPRAM 20 MILLIGRAM(S): 10 TABLET, FILM COATED ORAL at 13:38

## 2025-01-23 RX ADMIN — ATORVASTATIN CALCIUM 40 MILLIGRAM(S): 80 TABLET, FILM COATED ORAL at 22:51

## 2025-01-23 NOTE — PROGRESS NOTE ADULT - SUBJECTIVE AND OBJECTIVE BOX
PATIENT SEEN AND EXAMINED BY TAMI AN M.D. ON :- 1/23/25  DATE OF SERVICE:     1/23/25        Interim events noted,Labs ,Radiological studies and Cardiology tests reviewed .    Patient is a 89y old  Female who presents with a chief complaint of acute encephalopathy , subacute stroke (23 Jan 2025 10:52)      HPI:  89F from home walks with cane PMH of paroxysmal Afib, SVT, HLD, rheumatoid arthritis; Mycobacterium avium complex presented with acute mental status changes. Patient is AOx3 at baseline, living alone. Collateral obtained by niece. Patient was last seen 2 days ago and may have had a doctors appointment with Dr Rhodes, her PCP. Patient was found by her neighbor on the floor in her apartment.     (17 Jan 2025 06:49)      PAST MEDICAL & SURGICAL HISTORY:  Atrial fibrillation      Rheumatoid arthritis      Paroxysmal atrial fibrillation      HTN (hypertension)      JACINTA (mycobacterium avium-intracellulare)      No significant past surgical history          PREVIOUS DIAGNOSTIC TESTING:      ECHO  FINDINGS:    STRESS  FINDINGS:    CATHETERIZATION  FINDINGS:    MEDICATIONS  (STANDING):  apixaban 2.5 milliGRAM(s) Oral every 12 hours  atenolol  Tablet 50 milliGRAM(s) Oral daily  atorvastatin 40 milliGRAM(s) Oral daily  bisacodyl 5 milliGRAM(s) Oral at bedtime  escitalopram 20 milliGRAM(s) Oral daily  polyethylene glycol 3350 17 Gram(s) Oral daily  senna 2 Tablet(s) Oral at bedtime    MEDICATIONS  (PRN):  acetaminophen     Tablet .. 650 milliGRAM(s) Oral every 6 hours PRN Temp greater or equal to 38C (100.4F), Mild Pain (1 - 3)  albuterol    90 MICROgram(s) HFA Inhaler 2 Puff(s) Inhalation every 6 hours PRN for bronchospasm  aluminum hydroxide/magnesium hydroxide/simethicone Suspension 30 milliLiter(s) Oral every 4 hours PRN Dyspepsia  lactulose Syrup 10 Gram(s) Oral daily PRN constipation  melatonin 3 milliGRAM(s) Oral at bedtime PRN Insomnia  ondansetron Injectable 4 milliGRAM(s) IV Push every 8 hours PRN Nausea and/or Vomiting      FAMILY HISTORY:  No pertinent family history in first degree relatives        SOCIAL HISTORY:    CIGARETTES:    ALCOHOL:    REVIEW OF SYSTEMS:  CONSTITUTIONAL: No fever, weight loss, or fatigue  EYES: No eye pain, visual disturbances, or discharge  ENMT:  No difficulty hearing, tinnitus, vertigo; No sinus or throat pain  NECK: No pain or stiffness  RESPIRATORY: No cough, wheezing, chills or hemoptysis; No shortness of breath  CARDIOVASCULAR: No chest pain, palpitations, dizziness, or leg swelling  GASTROINTESTINAL: No abdominal or epigastric pain. No nausea, vomiting, or hematemesis; No diarrhea or constipation. No melena or hematochezia.  GENITOURINARY: No dysuria, frequency, hematuria, or incontinence  NEUROLOGICAL: No headaches, memory loss, loss of strength, numbness, or tremors  SKIN: No itching, burning, rashes, or lesions   LYMPH NODES: No enlarged glands  ENDOCRINE: No heat or cold intolerance; No hair loss  MUSCULOSKELETAL: No joint pain or swelling; No muscle, back, or extremity pain  PSYCHIATRIC: No depression, anxiety, mood swings, or difficulty sleeping  HEME/LYMPH: No easy bruising, or bleeding gums  ALLERY AND IMMUNOLOGIC: No hives or eczema    Vital Signs Last 24 Hrs  T(C): 36.4 (23 Jan 2025 20:25), Max: 36.5 (23 Jan 2025 10:43)  T(F): 97.6 (23 Jan 2025 20:25), Max: 97.7 (23 Jan 2025 10:43)  HR: 83 (23 Jan 2025 20:25) (66 - 96)  BP: 114/78 (23 Jan 2025 20:25) (95/54 - 145/81)  BP(mean): --  RR: 18 (23 Jan 2025 20:25) (18 - 19)  SpO2: 89% (23 Jan 2025 20:25) (89% - 96%)    Parameters below as of 23 Jan 2025 20:25  Patient On (Oxygen Delivery Method): room air          PHYSICAL EXAM:  GENERAL: NAD, well-groomed, well-developed  HEAD:  Atraumatic, Normocephalic  EYES: EOMI, PERRLA, conjunctiva and sclera clear  ENMT: No tonsillar erythema, exudates, or enlargement; Moist mucous membranes, Good dentition, No lesions  NECK: Supple, No JVD, Normal thyroid  NERVOUS SYSTEM:  Alert & Oriented X3, Good concentration; Motor Strength 5/5 B/L upper and lower extremities; DTRs 2+ intact and symmetric  CHEST/LUNG: Clear to percussion bilaterally; No rales, rhonchi, wheezing, or rubs  HEART: Regular rate and rhythm; No murmurs, rubs, or gallops  ABDOMEN: Soft, Nontender, Nondistended; Bowel sounds present  EXTREMITIES:  2+ Peripheral Pulses, No clubbing, cyanosis, or edema  LYMPH: No lymphadenopathy noted  SKIN: No rashes or lesions      INTERPRETATION OF TELEMETRY:    [facility]    [facility] [place]     LABS:                        13.2   7.08  )-----------( 220      ( 22 Jan 2025 05:43 )             39.8     01-22    136  |  103  |  15  ----------------------------<  92  3.7   |  25  |  0.52    Ca    8.6      22 Jan 2025 05:43  Phos  3.8     01-22  Mg     1.8     01-22            Urinalysis Basic - ( 22 Jan 2025 05:43 )    Color: x / Appearance: x / SG: x / pH: x  Gluc: 92 mg/dL / Ketone: x  / Bili: x / Urobili: x   Blood: x / Protein: x / Nitrite: x   Leuk Esterase: x / RBC: x / WBC x   Sq Epi: x / Non Sq Epi: x / Bacteria: x      Lipid Panel:   I&O's Summary    22 Jan 2025 07:01  -  23 Jan 2025 07:00  --------------------------------------------------------  IN: 0 mL / OUT: 500 mL / NET: -500 mL    23 Jan 2025 07:01  -  23 Jan 2025 22:00  --------------------------------------------------------  IN: 200 mL / OUT: 525 mL / NET: -325 mL        RADIOLOGY & ADDITIONAL STUDIES:    < from: TTE W or WO Ultrasound Enhancing Agent (01.17.25 @ 13:23) >     CONCLUSIONS:      1. Left ventricular systolic function is normal with an ejection fraction visually estimated at 55 to 60 %.   2. There is normal LV mass and normal geometry.   3. The right ventricle is not well visualized. probably enlarged right ventricular cavity size and normal right ventricular systolic function.   4. Agitated saline injection was negative for intracardiac shunt.   5. Estimated pulmonary artery systolic pressure is 62 mmHg, consistent with severe pulmonary hypertension.   6. Moderate to severe tricuspid regurgitation.    < end of copied text >

## 2025-01-23 NOTE — CHART NOTE - NSCHARTNOTEFT_GEN_A_CORE
EVENT: Nurse reporting that pt desaturated to 88% on room air    BRIEF HPI: 89F with a PMH of paroxysmal Afib, SVT, HDL, rheumatoid arthritis; Mycobacterium avium complex admitted for acute encephalopathy and subacute CVA. MRI: acute infarct in R basal ganglia/anterior corona radiata, subacute medial L occipital/inferior parietal region, L frontal extra-axial lesion, possibly meningioma. Dysphagia screen passed. PT re-eval  recommend Acute Rehab which will also provide OT. Possibly [facility]. Auth pending.    Vital Signs Last 24 Hrs  T(C): 36.4 (23 Jan 2025 20:25), Max: 36.5 (23 Jan 2025 10:43)  T(F): 97.6 (23 Jan 2025 20:25), Max: 97.7 (23 Jan 2025 10:43)  HR: 83 (23 Jan 2025 20:25) (66 - 96)  BP: 114/78 (23 Jan 2025 20:25) (95/54 - 145/81)  BP(mean): --  RR: 18 (23 Jan 2025 20:25) (18 - 19)  SpO2: 89% (23 Jan 2025 20:25) (89% - 96%)    Parameters below as of 23 Jan 2025 20:25  Patient On (Oxygen Delivery Method): room air    FOCUSSED PE:  GEN: Pleasant, elderly female in bed  RESP: Even, unlabored  CV: S1 S2 irregular  NEURO: Awake, oriented X 3    PLAN  NC 2 L/min down-titrate when appropriate EVENT: Nurse reporting that pt desaturated to 88% on room air    BRIEF HPI: 89F with a PMH of paroxysmal Afib, SVT, HDL, rheumatoid arthritis; Mycobacterium avium complex admitted for acute encephalopathy and subacute CVA. MRI: acute infarct in R basal ganglia/anterior corona radiata, subacute medial L occipital/inferior parietal region, L frontal extra-axial lesion, possibly meningioma. Dysphagia screen passed. PT re-eval  recommend Acute Rehab which will also provide OT. Possibly [facility]. Auth pending.    Vital Signs Last 24 Hrs  T(C): 36.4 (23 Jan 2025 20:25), Max: 36.5 (23 Jan 2025 10:43)  T(F): 97.6 (23 Jan 2025 20:25), Max: 97.7 (23 Jan 2025 10:43)  HR: 83 (23 Jan 2025 20:25) (66 - 96)  BP: 114/78 (23 Jan 2025 20:25) (95/54 - 145/81)  BP(mean): --  RR: 18 (23 Jan 2025 20:25) (18 - 19)  SpO2: 89% (23 Jan 2025 20:25) (89% - 96%)    Parameters below as of 23 Jan 2025 20:25  Patient On (Oxygen Delivery Method): room air  ACC: [id number] EXAM:  XR CHEST AP OR PA 1V     PROCEDURE DATE:  01/16/2025    INTERPRETATION:  CLINICAL HISTORY: found down screening for traumatic injury.  Comparison : None available.  Findings:  Support devices: None. Telemetry leads are noted  Cardiac/mediastinum/hilum: Unremarkable.  Lung parenchyma/Pleura: Bilateral perihilar linear scarring/possible   upper lobe volume loss. No pneumothorax or effusion.  Skeleton/soft tissues: Degenerative changes. No definite acute traumatic   injury.  Impression: Bilateral perihilar linear scarring/possible upper lobe volume loss. CT of the chest is recommended.    PROBLEM: Hypoxia probably due to bilateral perihilar linear scarring Vs atelectasis  FOCUSSED PE:  GEN: Pleasant, elderly female in bed  RESP: Even, unlabored  CV: S1 S2 irregular  NEURO: Awake, oriented X 2-3    PLAN  NC 2 L/min down-titrate when appropriate  Incentive spirometry     FOLLOW UP: possible chest CT EVENT: Nurse reporting that pt desaturated to 88% on room air    BRIEF HPI: 89F with a PMH of paroxysmal Afib, SVT, HDL, rheumatoid arthritis; Mycobacterium avium complex admitted for acute encephalopathy and subacute CVA. MRI: acute infarct in R basal ganglia/anterior corona radiata, subacute medial L occipital/inferior parietal region, L frontal extra-axial lesion, possibly meningioma. Dysphagia screen passed. PT re-eval  recommend Acute Rehab which will also provide OT. Possibly [facility]. Auth pending.    Vital Signs Last 24 Hrs  T(C): 36.4 (23 Jan 2025 20:25), Max: 36.5 (23 Jan 2025 10:43)  T(F): 97.6 (23 Jan 2025 20:25), Max: 97.7 (23 Jan 2025 10:43)  HR: 83 (23 Jan 2025 20:25) (66 - 96)  BP: 114/78 (23 Jan 2025 20:25) (95/54 - 145/81)  BP(mean): --  RR: 18 (23 Jan 2025 20:25) (18 - 19)  SpO2: 88 % (23 Jan 2025 20:25) (89% - 96%)    Parameters below as of 23 Jan 2025 20:25  Patient On (Oxygen Delivery Method): room air    FOCUSSED PE:  GEN: Pleasant, elderly female in bed  RESP: Even, unlabored  CV: S1 S2 irregular  NEURO: Awake, oriented X 2    ACC: [id number] EXAM:  XR CHEST AP OR PA 1V     PROCEDURE DATE:  01/16/2025    INTERPRETATION:  CLINICAL HISTORY: found down screening for traumatic injury.  Comparison : None available.  Findings:  Support devices: None. Telemetry leads are noted  Cardiac/mediastinum/hilum: Unremarkable.  Lung parenchyma/Pleura: Bilateral perihilar linear scarring/possible   upper lobe volume loss. No pneumothorax or effusion.  Skeleton/soft tissues: Degenerative changes. No definite acute traumatic   injury.  Impression: Bilateral perihilar linear scarring/possible upper lobe volume loss. CT of the chest is recommended.    PROBLEM: Hypoxia probably due to bilateral perihilar linear scarring Vs atelectasis  PLAN  NC 2 L/min down-titrate when appropriate  Incentive spirometry     FOLLOW UP: possible chest CT

## 2025-01-23 NOTE — PROGRESS NOTE ADULT - ATTENDING COMMENTS
Patient seen and examined this afternoon    89 yo F with PMH of Paroxysmal A-fib, SVT, HLD brought from home confused and altered after she was found down on floor > 24 hours after she was last seen in her normal functioning state. Work up in the ED shows  hypoxic-ischemic encephalopathy likely related to possible subacute infarct in the right basal ganglia admitted for further management of possible acute stroke. MRI showing a new acute stroke in addition to subacute infarct in the setting of PAF. Patient initially refused anticoagulation but has now agreed    Patient is doing okay; comfortable in bed; NAD; Patient was seen by PT yesterday afternoon and recommended Acute Rehab for PT/OT; daughter in agreement    Vital Signs Last 24 Hrs  T(C): 36.4 (23 Jan 2025 13:57), Max: 36.6 (22 Jan 2025 19:57)  T(F): 97.6 (23 Jan 2025 13:57), Max: 97.9 (22 Jan 2025 19:57)  HR: 84 (23 Jan 2025 13:57) (66 - 96)  BP: 99/64 (23 Jan 2025 13:57) (95/54 - 145/81)  RR: 18 (23 Jan 2025 13:57) (18 - 19)  SpO2: 94% (23 Jan 2025 13:57) (92% - 96%): room air      P/E:  Psych: AAO x3; stable mood  Neuro: No gross new focal deficits; Power and sensation intact  CVS: S1S2 present, regular, no edema  Resp: BLAE+, No wheeze or Rhonchi  GI: Soft, BS+, Non tender, non distended  Extr: No  calf tenderness B/L Lower extremities  Skin: Warm and moist without any rashes    Labs and Imaging reviewed:                                   13.2   7.08  )-----------( 220      ( 22 Jan 2025 05:43 )             39.8     01-22    136  |  103  |  15  ----------------------------<  92  3.7   |  25  |  0.52    Ca    8.6      22 Jan 2025 05:43  Phos  3.8     01-22  Mg     1.8     01-22          MR Head No Cont (01.20.25 @ 14:17) >  This exam is compared with prior head CT performed on January 16, 2025  Parenchymal volume loss and chronic microvessel ischemic changes are identified.    There is abnormalT2 prolongation with restricted diffusion seen involving the right basal ganglia/anterior coronal radiata region. This is compatible with an acute infarct. No hemorrhagic transformation is seen. No significant shift or herniation is seen. Abnormal T2 prolongation is seen involving the medial left occipital/inferior parietal cortex. These findings do demonstrate mild increased signal on the diffusion weighted sequence as well as minimal decreased signal on the ADC mapping. These findings could be compatible with subacute infarcts. No hemorrhagic transformation is seen. No significant shift or herniation is seen.    Left frontal extra-axial lesion is seen (7-16) this finding measures approximately 0.6 cm and could be compatible with a small meningioma.   Contrast enhanced MRI of the brain can be done for further evaluation.  The large vessels demonstrate normal flow voids. The paranasal sinuses appear clear  Inflammatory change involving both mastoid regions left greater than right.  Patient is status post bilateral cataract surgery.    IMPRESSION: Infarcts as described above.        A/P:  #Acute CVA Right basal ganglia and Corona Radiata   Subacute infarct left occipital/ inferior parietal cortex  #A. fib intermittent RVR   #Hx Depression with Anxiety    Plan:  PT re-eval appreciated; still recommend Acute Rehab which will also provide OT in this healthy 89 year old    reached out to Daughter; [facility] choice provided; as per  does not need Auth  No dysphagia; regular diet tolerated  Continue Eliquis and Statin; stroke in multiple areas concerning for embolic stroke in the setting of underlying A. Fib  TTE = EF 55-60% otherwise normal  Continue bowel regimen: Miralax, senna, add bisacodyl and lactulose prn and use for goal of 1 to 2 BM per day   Labs stable; no eed to repeat daily  Rest as per PGY1 above  Discussed with Patient  Discussed with Housestaff, PT,  and RN; medically stable for Rehab  Discussed with Daughter Haydee this evening and reviewed; agrees with Acute Rehab; pending acceptance to [facility] Patient seen and examined this afternoon    89 yo F with PMH of Paroxysmal A-fib, SVT, HLD brought from home confused and altered after she was found down on floor > 24 hours after she was last seen in her normal functioning state. Work up in the ED shows  hypoxic-ischemic encephalopathy likely related to possible subacute infarct in the right basal ganglia admitted for further management of possible acute stroke. MRI showing a new acute stroke in addition to subacute infarct in the setting of PAF. Patient initially refused anticoagulation but has now agreed    Patient is doing okay; comfortable in bed; NAD; Patient was seen by PT yesterday afternoon and recommended Acute Rehab for PT/OT; daughter in agreement    Vital Signs Last 24 Hrs  T(C): 36.4 (23 Jan 2025 13:57), Max: 36.6 (22 Jan 2025 19:57)  T(F): 97.6 (23 Jan 2025 13:57), Max: 97.9 (22 Jan 2025 19:57)  HR: 84 (23 Jan 2025 13:57) (66 - 96)  BP: 99/64 (23 Jan 2025 13:57) (95/54 - 145/81)  RR: 18 (23 Jan 2025 13:57) (18 - 19)  SpO2: 94% (23 Jan 2025 13:57) (92% - 96%): room air      P/E:  Psych: AAO x3; stable mood  Neuro: No gross new focal deficits; Power and sensation intact  CVS: S1S2 present, regular, no edema  Resp: BLAE+, No wheeze or Rhonchi  GI: Soft, BS+, Non tender, non distended  Extr: No  calf tenderness B/L Lower extremities  Skin: Warm and moist without any rashes    Labs and Imaging reviewed:                                   13.2   7.08  )-----------( 220      ( 22 Jan 2025 05:43 )             39.8     01-22    136  |  103  |  15  ----------------------------<  92  3.7   |  25  |  0.52    Ca    8.6      22 Jan 2025 05:43  Phos  3.8     01-22  Mg     1.8     01-22          MR Head No Cont (01.20.25 @ 14:17) >  This exam is compared with prior head CT performed on January 16, 2025  Parenchymal volume loss and chronic microvessel ischemic changes are identified.    There is abnormalT2 prolongation with restricted diffusion seen involving the right basal ganglia/anterior coronal radiata region. This is compatible with an acute infarct. No hemorrhagic transformation is seen. No significant shift or herniation is seen. Abnormal T2 prolongation is seen involving the medial left occipital/inferior parietal cortex. These findings do demonstrate mild increased signal on the diffusion weighted sequence as well as minimal decreased signal on the ADC mapping. These findings could be compatible with subacute infarcts. No hemorrhagic transformation is seen. No significant shift or herniation is seen.    Left frontal extra-axial lesion is seen (7-16) this finding measures approximately 0.6 cm and could be compatible with a small meningioma.   Contrast enhanced MRI of the brain can be done for further evaluation.  The large vessels demonstrate normal flow voids. The paranasal sinuses appear clear  Inflammatory change involving both mastoid regions left greater than right.  Patient is status post bilateral cataract surgery.    IMPRESSION: Infarcts as described above.        A/P:  #Acute CVA Right basal ganglia and Corona Radiata   Subacute infarct left occipital/ inferior parietal cortex  #A. fib intermittent RVR   #Hx Depression with Anxiety    Plan:  PT re-eval appreciated; still recommend Acute Rehab which will also provide OT in this healthy 89 year old    reached out to Daughter; [facility] choice provided; as per  does not need Auth  No dysphagia; regular diet tolerated  Continue Eliquis and Statin; stroke in multiple areas concerning for embolic stroke in the setting of underlying A. Fib  TTE = EF 55-60% otherwise normal  Continue bowel regimen: Miralax, senna, add bisacodyl and lactulose prn and use for goal of 1 to 2 BM per day   Labs stable; no eed to repeat daily  Rest as per PGY1 above  Discussed with Patient  Discussed with Housestaff, PT,  and RN; medically stable for Rehab  Discussed with GLADIS Hubbard this evening and reviewed; agrees with Acute Rehab; pending acceptance to [facility]

## 2025-01-23 NOTE — PROGRESS NOTE ADULT - ASSESSMENT
Assessment and Plan:    Assessment: 89F with a PMH of paroxysmal Afib(Not on AC, only BB) , SVT, HLD, rheumatoid arthritis; Mycobacterium avium complex BIBEMS to [facility] ED on  01/16 for eval of AMS/found on the floor. Initial Non con CTH with acute Vs subacute infarcts in R BG and age indeterminate infarct in L occipital lobe. Pt admitted for further work up and management of encephalopathy. Neuro consulted for for stroke W/U. MRB w/ acute to subacute infarcts, CTA H/N w/no LVO or HGS and TTE w/ no PFO.     Impression : Resolving AMS/encephalopathy I/S/O Acute/subacute infarcts in R BG and L occipital lobe seen in MRI( B/L infarcts involving both anterior and posterior circulation) in a pt w/ Hx of pAF, non on AC(pt preferance?, only BB), likely mechanism Cardioembolic etiology.       Recommendations :    - Pt started on AC W/ Eliquis for pAF, continue w/AC for secondary stroke prevention.  - HbA1C: 5.8 (High risk pre DM range), Mx per primary team.  - LDL: 19, No neuro need for Atorvastatin given the LDL level(Titrate to LDL < 70)  - Neuro/Vitals per unit protocol.  - PT/OT eval and reccs appreciated.  - Patient can follow up with general neurology at [address] [place] [address],  1-2 weeks after discharge. Please instruct the patient to call [phone]  to schedule this appointment.    Discussed W/Neuro attending Dr. Vasquez.     Assessment and Plan:    Assessment: 89F with a PMH of paroxysmal Afib (Not previously on AC, only BB) , SVT, HLD, rheumatoid arthritis; Mycobacterium avium complex BIBEMS to [facility] ED on  01/16 for eval of AMS/found on the floor. Initial Non con CTH with acute Vs subacute infarcts in R BG and age indeterminate infarct in L occipital lobe. Pt admitted for further work up and management of encephalopathy. Neuro consulted for stroke W/U. MRB w/ acute to subacute infarcts, CTA H/N w/no LVO or HGS and TTE w/ no evident potential embolic source.    Impression : Resolving AMS/encephalopathy I/S/O Acute/subacute infarcts in R BG and L occipital lobe seen in MRI( B/L infarcts involving both anterior and posterior circulation) in a pt w/ Hx of pAF, non previously on AC (per her preference?, only BB).  Likely had cardioembolic infarcts. Was started on Eliquis.          Recommendations :    - Continue AC W/ Eliquis for pAF, for recurrent stroke prevention.  - HbA1C: 5.8 (High risk pre DM range), Mx per primary team.  - LDL: 19, No neuro need for Atorvastatin given the LDL level(Titrate to LDL < 70)  - Neuro/Vitals per unit protocol.  - PT/OT eval and reccs appreciated.  - Patient can follow up with general neurology at [address] [place] [address],  1-2 weeks after discharge. Please instruct the patient to call [phone]  to schedule this appointment.    Discussed W/seen by Neuro attending Dr. Vasquez.

## 2025-01-23 NOTE — PROGRESS NOTE ADULT - PROBLEM SELECTOR PLAN 1
patient presenting with sided deficits  CTH showing subacute infarct  EKG showed tachy w RBBB  out of window for TNK  aspirin suppository  lipid panel: chol 57, tg 58, hdl 24, ldl 19  A1c 5.8  TTE: LVEF 55-60%, severe pHTN, mod-severe TR, neg PFO  MRI: acute infarct in R basal ganglia/anterior corona radiata, subacute medial L occipital/inferior parietal region, L frontal extra-axial lesion, possibly meningioma  q4 neuro checks  aspiration precautions  initial dysphagia screen failed, passed 2nd dysphagia screen, SnS rec regular diet  Neuro Dr. Longo following

## 2025-01-23 NOTE — PROGRESS NOTE ADULT - NS ATTEND AMEND GEN_ALL_CORE FT
Pt with acute R BG and corona radiata, and multiple subacute, ischemic cerebral hemispheric infarcts likely cardioembolic in the setting of paroxymal A fib and not having been on anticoagulation.  Admitted with acute AMS, sepsis, new L hemiparesis (generalized weakness, weaker LUE/LLE).  No evident embolic source found on TTE.  I agree with the above recommendations.

## 2025-01-23 NOTE — PROGRESS NOTE ADULT - ATTENDING SUPERVISION STATEMENT
Resident
Resident
CONSTITUTIONAL: In no apparent distress.   HEAD: Normocephalic; atraumatic.   EYES:  conjunctiva and sclera clear  ENT: normal nose; no rhinorrhea;  NECK: Supple; full ROM  RESPIRATORY: Breathing easily; no resp difficulty  EXT: No cyanosis or edema;  SKIN: Normal for age and race; warm; dry; good turgor; no apparent lesions or rash.   NEURO: A & O x 3; face symmetric; grossly unremarkable.   PSYCHOLOGICAL: The patient’s mood and manner are appropriate.
Resident

## 2025-01-23 NOTE — PROGRESS NOTE ADULT - SUBJECTIVE AND OBJECTIVE BOX
Neurology Stroke Progress Note    INTERVAL HPI/OVERNIGHT EVENTS:  Patient seen and examined @ bedside. Alert, awake, no complaints at this point of time. Per pt, has Hx of pAF, not on AC(pts  preference), was on BB(Atenolol) and verapamil. Was started on AC during this hospitalization Pt lives alone, ambulates w/cane. Pt reports she has no recall of events, was found on floor by neighbor who called EMS.         MEDICATIONS  (STANDING):  apixaban 2.5 milliGRAM(s) Oral every 12 hours  atenolol  Tablet 50 milliGRAM(s) Oral daily  atorvastatin 40 milliGRAM(s) Oral daily  bisacodyl 5 milliGRAM(s) Oral at bedtime  escitalopram 20 milliGRAM(s) Oral daily  polyethylene glycol 3350 17 Gram(s) Oral daily  senna 2 Tablet(s) Oral at bedtime    MEDICATIONS  (PRN):  acetaminophen     Tablet .. 650 milliGRAM(s) Oral every 6 hours PRN Temp greater or equal to 38C (100.4F), Mild Pain (1 - 3)  albuterol    90 MICROgram(s) HFA Inhaler 2 Puff(s) Inhalation every 6 hours PRN for bronchospasm  aluminum hydroxide/magnesium hydroxide/simethicone Suspension 30 milliLiter(s) Oral every 4 hours PRN Dyspepsia  lactulose Syrup 10 Gram(s) Oral daily PRN constipation  melatonin 3 milliGRAM(s) Oral at bedtime PRN Insomnia  ondansetron Injectable 4 milliGRAM(s) IV Push every 8 hours PRN Nausea and/or Vomiting      Allergies  No Known Allergies        Vital Signs Last 24 Hrs  T(C): 36.5 (23 Jan 2025 10:43), Max: 36.6 (22 Jan 2025 15:24)  T(F): 97.7 (23 Jan 2025 10:43), Max: 97.9 (22 Jan 2025 15:24)  HR: 87 (23 Jan 2025 10:43) (62 - 96)  BP: 109/66 (23 Jan 2025 10:43) (102/70 - 145/81)  BP(mean): --  RR: 18 (23 Jan 2025 10:43) (18 - 19)  SpO2: 93% (23 Jan 2025 10:43) (92% - 96%)    Parameters below as of 23 Jan 2025 10:43  Patient On (Oxygen Delivery Method): room air        Physical exam:  General: No acute distress, awake and alert  Eyes: Anicteric sclerae, moist conjunctivae, see below for CNs  Neck: trachea midline, FROM, supple.   Cardiovascular: Regular rate and rhythm.   Pulmonary: Respirations appear to be even and non labored.       Neurologic:  -Mental status: Awake, alert, oriented to person, place, and time. Speech is fluent with intact naming and function,  repetition, and comprehension, no dysarthria. Recent and remote memory intact. Follows commands. Attention/concentration intact. Fund of knowledge appropriate.    -Cranial nerves:   II: ? 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   III, IV, VI: Extraocular movements are intact without nystagmus. Pupils equally round and reactive to light.  V:  Facial sensation V1-V3 equal and intact   VII: Subtle L NLFF+.   VIII: Hearing is bilaterally intact to finger rub  IX, X: Uvula is midline and soft palate rises symmetrically  XI: Head turning and shoulder shrug are intact.  XII: Tongue protrudes midline  Motor: Normal bulk and tone. No pronator drift. LUE: 3+/5, RUE: 4+/5, bilateral lower extremities 4+/5. .  Sensation: Intact to light touch bilaterally. No neglect or extinction on double simultaneous testing.  Coordination: No dysmetria on finger-to-nose.   Reflexes: R upgoing and L downgoing toes.   Gait: JAYDEN.     LABS:                        13.2   7.08  )-----------( 220      ( 22 Jan 2025 05:43 )             39.8     01-22    136  |  103  |  15  ----------------------------<  92  3.7   |  25  |  0.52    Ca    8.6      22 Jan 2025 05:43  Phos  3.8     01-22  Mg     1.8     01-22        Urinalysis Basic - ( 22 Jan 2025 05:43 )    Color: x / Appearance: x / SG: x / pH: x  Gluc: 92 mg/dL / Ketone: x  / Bili: x / Urobili: x   Blood: x / Protein: x / Nitrite: x   Leuk Esterase: x / RBC: x / WBC x   Sq Epi: x / Non Sq Epi: x / Bacteria: x        RADIOLOGY & ADDITIONAL TESTS:    MR Head No Cont (01.20.25 @ 14:17)   There is abnormalT2 prolongation with restricted diffusion seen   involving the right basal ganglia/anterior coronal radiata region. This   is compatible with an acute infarct. No hemorrhagic transformation is   seen. No significant shift or herniation is seen.    Abnormal T2 prolongation is seen involving the medial left   occipital/inferior parietal cortex. These findings do demonstrate mild   increased signal on the diffusion weighted sequence as well as minimal   decreased signal on the ADC mapping. These findings could be compatible   with subacute infarcts. No hemorrhagic transformation is seen. No   significant shift or herniation is seen.    IMPRESSION: Infarcts as described above.      CT Head No Cont (01.16.25 @ 22:34)   IMPRESSION:    CT HEAD:  Approximately 2.5 x 2 cm acute versus early subacute infarct involving   the right caudate nucleus, right corona radiata and right lentiform   nucleus.    Small age-indeterminate transcortical infarct in the left occipital lobe   may be subacute or chronic.    No evidence of acute intracranial hemorrhage, midline shift or   hydrocephalus.    An incidental 7 mm extra-axial ossific versus calcific focus in anterior   left frontal region may represent a meningioma, osteoma or calvarial   hyperostosis.    Nonspecific trace left mastoid effusion.    CT CERVICAL SPINE:  No CT evidence of cervical spine fracture, traumatic malalignment, or   suspicious osseous lesion.    Mild cervical spine degenerative changes.    Incidental subcentimeter thyroid nodules.  In the absence of a family   history or risk factors for thyroid cancer, the American College of   radiology does not recommend routine follow-up with incidental thyroid   nodules measuring less than 1.5 cm in this age group.    CT Angio Head and Neck w/ IV Cont (01.17.25 @ 01:28)   IMPRESSION:    CT BRAIN: Stable region of hypodensity in the right caudate nucleus,   corona radiata and right lentiform nucleus suspicious for subacute   infarct. No acute hemorrhage.    CTA NECK:  No significant stenosis of the cervical carotid or vertebral   arteries.    CTA HEAD:  No significant stenosis or occlusion of the major proximal   branches of the [place].    TTE W or WO Ultrasound Enhancing Agent (01.17.25 @ 13:23)   CONCLUSIONS:      1. Left ventricular systolic function is normal with an ejection fraction visually estimated at 55 to 60 %.   2. There is normal LV mass and normal geometry.   3. The right ventricle is not well visualized. probably enlarged right ventricular cavity size and normal right ventricular systolic function.   4. Agitated saline injection was negative for intracardiac shunt.   5. Estimated pulmonary artery systolic pressure is 62 mmHg, consistent with severe pulmonary hypertension.   6. Moderate to severe tricuspid regurgitation.               Neurology Stroke Progress Note in follow-up to the initial Neurology Consult Note of 1/17/25.      INTERVAL HPI/OVERNIGHT EVENTS:  Patient seen and examined @ bedside. Alert, awake, no complaints at this point of time. Per pt, has Hx of pAF,  not on AC (pts  preference) prior to this hospitalization.  Has been on BB (Atenolol) and verapamil. Was started on AC during this hospitalization Pt lives alone, ambulates w/cane. Pt reports she has no recall of events, was found on floor by neighbor who called EMS.         MEDICATIONS  (STANDING):  apixaban 2.5 milliGRAM(s) Oral every 12 hours  atenolol  Tablet 50 milliGRAM(s) Oral daily  atorvastatin 40 milliGRAM(s) Oral daily  bisacodyl 5 milliGRAM(s) Oral at bedtime  escitalopram 20 milliGRAM(s) Oral daily  polyethylene glycol 3350 17 Gram(s) Oral daily  senna 2 Tablet(s) Oral at bedtime    MEDICATIONS  (PRN):  acetaminophen     Tablet .. 650 milliGRAM(s) Oral every 6 hours PRN Temp greater or equal to 38C (100.4F), Mild Pain (1 - 3)  albuterol    90 MICROgram(s) HFA Inhaler 2 Puff(s) Inhalation every 6 hours PRN for bronchospasm  aluminum hydroxide/magnesium hydroxide/simethicone Suspension 30 milliLiter(s) Oral every 4 hours PRN Dyspepsia  lactulose Syrup 10 Gram(s) Oral daily PRN constipation  melatonin 3 milliGRAM(s) Oral at bedtime PRN Insomnia  ondansetron Injectable 4 milliGRAM(s) IV Push every 8 hours PRN Nausea and/or Vomiting    Vital Signs Last 24 Hrs  T(C): 36.5 (23 Jan 2025 10:43), Max: 36.6 (22 Jan 2025 15:24)  T(F): 97.7 (23 Jan 2025 10:43), Max: 97.9 (22 Jan 2025 15:24)  HR: 87 (23 Jan 2025 10:43) (62 - 96)  BP: 109/66 (23 Jan 2025 10:43) (102/70 - 145/81)  BP(mean): --  RR: 18 (23 Jan 2025 10:43) (18 - 19)  SpO2: 93% (23 Jan 2025 10:43) (92% - 96%)    Parameters below as of 23 Jan 2025 10:43  Patient On (Oxygen Delivery Method): room air      Physical exam:  General: No acute distress, awake and alert  Eyes: Anicteric sclerae, moist conjunctivae, see below for CNs  Neck: trachea midline, FROM, supple.   Cardiovascular: Regular rate and rhythm.   Pulmonary: Respirations appear to be even and non labored.       Neurologic:  -Mental status: Awake, alert, oriented to person, place, and time. Speech is fluent with intact naming and function,  repetition, and comprehension, no dysarthria. Recent and remote memory intact. Follows commands. Attention/concentration intact. Fund of knowledge appropriate.    -Cranial nerves:   II: ? 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   III, IV, VI: Extraocular movements are intact without nystagmus. Pupils equally round and reactive to light.  V:  Facial sensation V1-V3 equal and intact   VII: Subtle L NLFF+.   VIII: Hearing is bilaterally intact to finger rub  IX, X: Uvula is midline and soft palate rises symmetrically  XI: Head turning and shoulder shrug are intact.  XII: Tongue protrudes midline  Motor: Normal bulk and tone. No pronator drift. LUE: 3+/5, RUE: 4+/5, bilateral lower extremities 4+/5. .  Sensation: Intact to light touch bilaterally. No neglect or extinction on double simultaneous testing.  Coordination: No dysmetria on finger-to-nose.   Reflexes: R upgoing and L downgoing toes.   Gait: JAYDEN.     LABS:                        13.2   7.08  )-----------( 220      ( 22 Jan 2025 05:43 )             39.8     01-22    136  |  103  |  15  ----------------------------<  92  3.7   |  25  |  0.52    Ca    8.6      22 Jan 2025 05:43  Phos  3.8     01-22  Mg     1.8     01-22        Urinalysis Basic - ( 22 Jan 2025 05:43 )    Color: x / Appearance: x / SG: x / pH: x  Gluc: 92 mg/dL / Ketone: x  / Bili: x / Urobili: x   Blood: x / Protein: x / Nitrite: x   Leuk Esterase: x / RBC: x / WBC x   Sq Epi: x / Non Sq Epi: x / Bacteria: x        RADIOLOGY & ADDITIONAL TESTS:    MR Head No Cont (01.20.25 @ 14:17)   There is abnormalT2 prolongation with restricted diffusion seen   involving the right basal ganglia/anterior coronal radiata region. This   is compatible with an acute infarct. No hemorrhagic transformation is   seen. No significant shift or herniation is seen.    Abnormal T2 prolongation is seen involving the medial left   occipital/inferior parietal cortex. These findings do demonstrate mild   increased signal on the diffusion weighted sequence as well as minimal   decreased signal on the ADC mapping. These findings could be compatible   with subacute infarcts. No hemorrhagic transformation is seen. No   significant shift or herniation is seen.    IMPRESSION: Infarcts as described above.      CT Head No Cont (01.16.25 @ 22:34)   IMPRESSION:    CT HEAD:  Approximately 2.5 x 2 cm acute versus early subacute infarct involving   the right caudate nucleus, right corona radiata and right lentiform   nucleus.    Small age-indeterminate transcortical infarct in the left occipital lobe   may be subacute or chronic.    No evidence of acute intracranial hemorrhage, midline shift or   hydrocephalus.    An incidental 7 mm extra-axial ossific versus calcific focus in anterior   left frontal region may represent a meningioma, osteoma or calvarial   hyperostosis.    Nonspecific trace left mastoid effusion.      CT Angio Head and Neck w/ IV Cont (01.17.25 @ 01:28)   IMPRESSION:    CT BRAIN: Stable region of hypodensity in the right caudate nucleus,   corona radiata and right lentiform nucleus suspicious for subacute   infarct. No acute hemorrhage.    CTA NECK:  No significant stenosis of the cervical carotid or vertebral   arteries.    CTA HEAD:  No significant stenosis or occlusion of the major proximal   branches of the [place] of [place].    TTE W or WO Ultrasound Enhancing Agent (01.17.25 @ 13:23)   CONCLUSIONS:      1. Left ventricular systolic function is normal with an ejection fraction visually estimated at 55 to 60 %.   2. There is normal LV mass and normal geometry.   3. The right ventricle is not well visualized. probably enlarged right ventricular cavity size and normal right ventricular systolic function.   4. Agitated saline injection was negative for intracardiac shunt.   5. Estimated pulmonary artery systolic pressure is 62 mmHg, consistent with severe pulmonary hypertension.   6. Moderate to severe tricuspid regurgitation.

## 2025-01-23 NOTE — PROGRESS NOTE ADULT - SUBJECTIVE AND OBJECTIVE BOX
PGY-1 Progress Note discussed with attending    PLEASE MS TEAMS AUTHOR TILL 5:00 PM    PLEASE CONTACT ON CALL TEAM:  - On Call Team (Please refer to Letty) FROM 5:00 PM - 8:30PM  - Nightfloat Team FROM 8:30 -7:30 AM      INTERVAL HPI/OVERNIGHT EVENTS: No acute events overnight.    SUBJECTIVE: Patient seen and examined at bedside this morning. No complaints today.    ---------------------------    REVIEW OF SYSTEMS:  CONSTITUTIONAL: No fever, weight loss, or fatigue  RESPIRATORY: No cough, wheezing, chills or hemoptysis; No shortness of breath  CARDIOVASCULAR: No chest pain, palpitations, dizziness, or leg swelling  GASTROINTESTINAL: No abdominal pain. No nausea, vomiting, or hematemesis; No diarrhea or constipation. No melena or hematochezia.  GENITOURINARY: No dysuria or hematuria, urinary frequency  NEUROLOGICAL: No headaches, memory loss, loss of strength, numbness, or tremors  SKIN: No itching, burning, rashes, or lesions     MEDICATIONS  (STANDING):  apixaban 2.5 milliGRAM(s) Oral every 12 hours  atenolol  Tablet 50 milliGRAM(s) Oral daily  atorvastatin 40 milliGRAM(s) Oral daily  bisacodyl 5 milliGRAM(s) Oral at bedtime  escitalopram 20 milliGRAM(s) Oral daily  polyethylene glycol 3350 17 Gram(s) Oral daily  senna 2 Tablet(s) Oral at bedtime    MEDICATIONS  (PRN):  acetaminophen     Tablet .. 650 milliGRAM(s) Oral every 6 hours PRN Temp greater or equal to 38C (100.4F), Mild Pain (1 - 3)  albuterol    90 MICROgram(s) HFA Inhaler 2 Puff(s) Inhalation every 6 hours PRN for bronchospasm  aluminum hydroxide/magnesium hydroxide/simethicone Suspension 30 milliLiter(s) Oral every 4 hours PRN Dyspepsia  lactulose Syrup 10 Gram(s) Oral daily PRN constipation  melatonin 3 milliGRAM(s) Oral at bedtime PRN Insomnia  ondansetron Injectable 4 milliGRAM(s) IV Push every 8 hours PRN Nausea and/or Vomiting      Vital Signs Last 24 Hrs  T(C): 36.4 (23 Jan 2025 07:36), Max: 36.6 (22 Jan 2025 15:24)  T(F): 97.5 (23 Jan 2025 07:36), Max: 97.9 (22 Jan 2025 15:24)  HR: 66 (23 Jan 2025 07:36) (62 - 96)  BP: 116/72 (23 Jan 2025 07:36) (102/70 - 145/81)  BP(mean): --  RR: 19 (23 Jan 2025 07:36) (18 - 19)  SpO2: 94% (23 Jan 2025 07:36) (92% - 96%)    Parameters below as of 23 Jan 2025 07:36  Patient On (Oxygen Delivery Method): room air        -----------------------------    PHYSICAL EXAMINATION:  GENERAL: NAD, elderly female lying in bed  HEAD:  Atraumatic, Normocephalic  EYES:  conjunctiva and sclera clear  NECK: Supple, No JVD  CHEST/LUNG: Clear to auscultation bilaterally; No rales, rhonchi, wheezing, or rubs  HEART: Regular rate and rhythm; No murmurs, rubs, or gallops  ABDOMEN: Soft, Nontender, Nondistended; Bowel sounds present, no pain or masses on palpation  NERVOUS SYSTEM:  Alert & Oriented X2, CN2-12 intact, 4/5 strength in all extremities  : voiding well  EXTREMITIES:  2+ Peripheral Pulses, No clubbing, cyanosis, or edema. +rheumatoid nodules on MCP joints  SKIN: warm dry                          13.2   7.08  )-----------( 220      ( 22 Jan 2025 05:43 )             39.8     01-22    136  |  103  |  15  ----------------------------<  92  3.7   |  25  |  0.52    Ca    8.6      22 Jan 2025 05:43  Phos  3.8     01-22  Mg     1.8     01-22                I&O's Summary    22 Jan 2025 07:01  -  23 Jan 2025 07:00  --------------------------------------------------------  IN: 0 mL / OUT: 500 mL / NET: -500 mL            CAPILLARY BLOOD GLUCOSE      RADIOLOGY & ADDITIONAL TESTS:

## 2025-01-23 NOTE — PROGRESS NOTE ADULT - PROBLEM SELECTOR PLAN 5
hx of afib on verapamil 120 bid and atenolol 20 qd  not on AC    - increase atenolol to 50mg qd  - c/w eliquis 2.5mg bid

## 2025-01-24 DIAGNOSIS — Z75.8 OTHER PROBLEMS RELATED TO MEDICAL FACILITIES AND OTHER HEALTH CARE: ICD-10-CM

## 2025-01-24 PROCEDURE — 71045 X-RAY EXAM CHEST 1 VIEW: CPT | Mod: 26

## 2025-01-24 PROCEDURE — 99232 SBSQ HOSP IP/OBS MODERATE 35: CPT

## 2025-01-24 PROCEDURE — 71250 CT THORAX DX C-: CPT | Mod: 26

## 2025-01-24 RX ADMIN — APIXABAN 2.5 MILLIGRAM(S): 5 TABLET, FILM COATED ORAL at 21:58

## 2025-01-24 RX ADMIN — ESCITALOPRAM 20 MILLIGRAM(S): 10 TABLET, FILM COATED ORAL at 11:12

## 2025-01-24 RX ADMIN — APIXABAN 2.5 MILLIGRAM(S): 5 TABLET, FILM COATED ORAL at 09:15

## 2025-01-24 RX ADMIN — Medication 2 TABLET(S): at 21:58

## 2025-01-24 RX ADMIN — Medication 50 MILLIGRAM(S): at 05:23

## 2025-01-24 RX ADMIN — ATORVASTATIN CALCIUM 40 MILLIGRAM(S): 80 TABLET, FILM COATED ORAL at 21:58

## 2025-01-24 RX ADMIN — Medication 5 MILLIGRAM(S): at 21:58

## 2025-01-24 NOTE — PROGRESS NOTE ADULT - NS ATTEND AMEND GEN_ALL_CORE FT
Patient seen and examined this afternoon    89 yo F with PMH of Paroxysmal A-fib, SVT, HLD brought from home confused and altered after she was found down on floor > 24 hours after she was last seen in her normal functioning state. Work up in the ED shows  hypoxic-ischemic encephalopathy likely related to possible subacute infarct in the right basal ganglia admitted for further management of possible acute stroke. MRI showing a new acute stroke in addition to subacute infarct in the setting of PAF. Patient initially refused anticoagulation but has now agreed    Patient is doing okay; comfortable in bed; NAD; Patient was seen by PT yesterday afternoon and recommended Acute Rehab for PT/OT; daughter in agreement    Vital Signs Last 24 Hrs  T(C): 36.4 (23 Jan 2025 13:57), Max: 36.6 (22 Jan 2025 19:57)  T(F): 97.6 (23 Jan 2025 13:57), Max: 97.9 (22 Jan 2025 19:57)  HR: 84 (23 Jan 2025 13:57) (66 - 96)  BP: 99/64 (23 Jan 2025 13:57) (95/54 - 145/81)  RR: 18 (23 Jan 2025 13:57) (18 - 19)  SpO2: 94% (23 Jan 2025 13:57) (92% - 96%): room air      P/E:  Psych: AAO x3; stable mood  Neuro: No gross new focal deficits; Power and sensation intact  CVS: S1S2 present, regular, no edema  Resp: BLAE+, No wheeze or Rhonchi  GI: Soft, BS+, Non tender, non distended  Extr: No  calf tenderness B/L Lower extremities  Skin: Warm and moist without any rashes    Labs and Imaging reviewed: no new stable 1/22/25                   MR Head No Cont (01.20.25 @ 14:17) >  This exam is compared with prior head CT performed on January 16, 2025  Parenchymal volume loss and chronic microvessel ischemic changes are identified.    There is abnormalT2 prolongation with restricted diffusion seen involving the right basal ganglia/anterior coronal radiata region. This is compatible with an acute infarct. No hemorrhagic transformation is seen. No significant shift or herniation is seen. Abnormal T2 prolongation is seen involving the medial left occipital/inferior parietal cortex. These findings do demonstrate mild increased signal on the diffusion weighted sequence as well as minimal decreased signal on the ADC mapping. These findings could be compatible with subacute infarcts. No hemorrhagic transformation is seen. No significant shift or herniation is seen.    Left frontal extra-axial lesion is seen (7-16) this finding measures approximately 0.6 cm and could be compatible with a small meningioma.   Contrast enhanced MRI of the brain can be done for further evaluation.  The large vessels demonstrate normal flow voids. The paranasal sinuses appear clear  Inflammatory change involving both mastoid regions left greater than right.  Patient is status post bilateral cataract surgery.    IMPRESSION: Infarcts as described above.        A/P:  #Acute CVA Right basal ganglia and Corona Radiata   Subacute infarct left occipital/ inferior parietal cortex  #A. fib intermittent RVR   #Hx Depression with Anxiety    Plan:  PT re-eval appreciated; still recommend Acute Rehab which will also provide OT in this healthy 89 year old    reached out to Daughter; [facility] choice provided; as per  does not need Auth  No dysphagia; regular diet tolerated  Continue Eliquis and Statin; stroke in multiple areas concerning for embolic stroke in the setting of underlying A. Fib  TTE = EF 55-60% otherwise normal  Continue bowel regimen: Miralax, senna, add bisacodyl and lactulose prn and use for goal of 1 to 2 BM per day   Labs stable; no eed to repeat daily  Rest as per PGY1 above  Discussed with Patient  Discussed with Housestaff, PT,  and RN; medically stable for Rehab  Discussed with Daughter Haydee this evening and reviewed; agrees with Acute Rehab; pending acceptance to [facility] Patient seen and examined this afternoon    89 yo F with PMH of Paroxysmal A-fib, SVT, HLD brought from home confused and altered after she was found down on floor > 24 hours after she was last seen in her normal functioning state. Work up in the ED shows  hypoxic-ischemic encephalopathy likely related to possible subacute infarct in the right basal ganglia admitted for further management of possible acute stroke. MRI showing a new acute stroke in addition to subacute infarct in the setting of PAF. Patient initially refused anticoagulation but has now agreed    Patient is doing okay; comfortable in bed; NAD; Patient was seen by PT yesterday afternoon and recommended Acute Rehab for PT/OT; daughter in agreement    Vital Signs Last 24 Hrs  T(C): 36.4 (23 Jan 2025 13:57), Max: 36.6 (22 Jan 2025 19:57)  T(F): 97.6 (23 Jan 2025 13:57), Max: 97.9 (22 Jan 2025 19:57)  HR: 84 (23 Jan 2025 13:57) (66 - 96)  BP: 99/64 (23 Jan 2025 13:57) (95/54 - 145/81)  RR: 18 (23 Jan 2025 13:57) (18 - 19)  SpO2: 94% (23 Jan 2025 13:57) (92% - 96%): room air      P/E:  Psych: AAO x2; stable mood slightly delirious today  Neuro: No gross new focal deficits; Power and sensation intact  CVS: S1S2 present, regular, no edema  Resp: BLAE+, No wheeze or Rhonchi  GI: Soft, BS+, Non tender, non distended  Extr: No  calf tenderness B/L Lower extremities  Skin: Warm and moist without any rashes    Labs and Imaging reviewed: no new stable 1/22/25; repeat AM          MR Head No Cont (01.20.25 @ 14:17)   This exam is compared with prior head CT performed on January 16, 2025  Parenchymal volume loss and chronic microvessel ischemic changes are identified.    There is abnormalT2 prolongation with restricted diffusion seen involving the right basal ganglia/anterior coronal radiata region. This is compatible with an acute infarct. No hemorrhagic transformation is seen. No significant shift or herniation is seen. Abnormal T2 prolongation is seen involving the medial left occipital/inferior parietal cortex. These findings do demonstrate mild increased signal on the diffusion weighted sequence as well as minimal decreased signal on the ADC mapping. These findings could be compatible with subacute infarcts. No hemorrhagic transformation is seen. No significant shift or herniation is seen.    Left frontal extra-axial lesion is seen (7-16) this finding measures approximately 0.6 cm and could be compatible with a small meningioma.   Contrast enhanced MRI of the brain can be done for further evaluation.  The large vessels demonstrate normal flow voids. The paranasal sinuses appear clear  Inflammatory change involving both mastoid regions left greater than right.  Patient is status post bilateral cataract surgery.    IMPRESSION: Infarcts as described above.        A/P:  #Acute CVA Right basal ganglia and Corona Radiata   Subacute infarct left occipital/ inferior parietal cortex  #A. fib intermittent RVR   #Hx Depression with Anxiety    Plan:  PT re-eval appreciated; still recommend Acute Rehab which will also provide OT in this healthy 89 year old    reached out to Daughter; [facility] choice provided; as per  does not need Auth  Patient accepted; awaiting bed   No dysphagia; regular diet tolerated  Continue Eliquis and Statin; stroke in multiple areas concerning for embolic stroke in the setting of underlying A. Fib  TTE = EF 55-60% otherwise normal  Continue bowel regimen: Miralax, senna, add bisacodyl and lactulose prn and use for goal of 1 to 2 BM per day   Labs stable; repeat AM; if delirium and disorientation persist will consider CT Head as pt on new a/c  Rest as per ACP above; d/w SHON Saavedra  Discussed with Patient; d/w daughter Haydee over the phone this evening and updated  Discussed with Housestaff, PT,  and RN; medically stable for Rehab Patient seen and examined this afternoon    89 yo F with PMH of Paroxysmal A-fib, SVT, HLD brought from home confused and altered after she was found down on floor > 24 hours after she was last seen in her normal functioning state. Work up in the ED shows  hypoxic-ischemic encephalopathy likely related to possible subacute infarct in the right basal ganglia admitted for further management of possible acute stroke. MRI showing a new acute stroke in addition to subacute infarct in the setting of PAF. Patient initially refused anticoagulation but has now agreed    Patient is doing okay; comfortable in bed; reports being in a restaurant. noted to be eating dinner well.  Patient was seen by PT and recommended Acute Rehab for PT/OT; daughter in agreement; accepted to [facility]; await bed; updated PT requested and completed.    Vital Signs Last 24 Hrs  T(C): 36.4 (23 Jan 2025 13:57), Max: 36.6 (22 Jan 2025 19:57)  T(F): 97.6 (23 Jan 2025 13:57), Max: 97.9 (22 Jan 2025 19:57)  HR: 84 (23 Jan 2025 13:57) (66 - 96)  BP: 99/64 (23 Jan 2025 13:57) (95/54 - 145/81)  RR: 18 (23 Jan 2025 13:57) (18 - 19)  SpO2: 94% (23 Jan 2025 13:57) (92% - 96%): room air      P/E:  Psych: AAO x2; stable mood slightly delirious today  Neuro: No gross new focal deficits; Power and sensation intact  CVS: S1S2 present, regular, no edema  Resp: BLAE+, No wheeze or Rhonchi  GI: Soft, BS+, Non tender, non distended  Extr: No  calf tenderness B/L Lower extremities  Skin: Warm and moist without any rashes    Labs and Imaging reviewed: no new stable 1/22/25; repeat AM          MR Head No Cont (01.20.25 @ 14:17)   This exam is compared with prior head CT performed on January 16, 2025  Parenchymal volume loss and chronic microvessel ischemic changes are identified.    There is abnormalT2 prolongation with restricted diffusion seen involving the right basal ganglia/anterior coronal radiata region. This is compatible with an acute infarct. No hemorrhagic transformation is seen. No significant shift or herniation is seen. Abnormal T2 prolongation is seen involving the medial left occipital/inferior parietal cortex. These findings do demonstrate mild increased signal on the diffusion weighted sequence as well as minimal decreased signal on the ADC mapping. These findings could be compatible with subacute infarcts. No hemorrhagic transformation is seen. No significant shift or herniation is seen.    Left frontal extra-axial lesion is seen (7-16) this finding measures approximately 0.6 cm and could be compatible with a small meningioma.   Contrast enhanced MRI of the brain can be done for further evaluation.  The large vessels demonstrate normal flow voids. The paranasal sinuses appear clear  Inflammatory change involving both mastoid regions left greater than right.  Patient is status post bilateral cataract surgery.    IMPRESSION: Infarcts as described above.        A/P:  #Acute CVA Right basal ganglia and Corona Radiata   Subacute infarct left occipital/ inferior parietal cortex  #A. fib intermittent RVR   #Hx Depression with Anxiety    Plan:  PT re-eval appreciated; still recommend Acute Rehab which will also provide OT in this healthy 89 year old    reached out to Daughter; [facility] choice provided; as per  does not need Auth  Patient accepted; awaiting bed   No dysphagia; regular diet tolerated  Continue Eliquis and Statin; stroke in multiple areas concerning for embolic stroke in the setting of underlying A. Fib  TTE = EF 55-60% otherwise normal  Continue bowel regimen: Miralax, senna, add bisacodyl and lactulose prn and use for goal of 1 to 2 BM per day   Labs stable; repeat AM; if delirium and disorientation persist will consider CT Head as pt on new a/c  Rest as per ACP above; d/w NP Ana  Discussed with Patient; d/w daughter Haydee over the phone this evening and updated; she also felt patient was slightly disoriented; monitor mental status closely d/w ACP  Discussed with Housestaff, PT,  and RN; medically stable for Rehab Patient seen and examined this afternoon    89 yo F with PMH of Paroxysmal A-fib, SVT, HLD brought from home confused and altered after she was found down on floor > 24 hours after she was last seen in her normal functioning state. Work up in the ED shows  hypoxic-ischemic encephalopathy likely related to possible subacute infarct in the right basal ganglia admitted for further management of possible acute stroke. MRI showing a new acute stroke in addition to subacute infarct in the setting of PAF. Patient initially refused anticoagulation but has now agreed    Patient is doing okay; comfortable in bed; reports being in a restaurant. noted to be eating dinner well.  Patient was seen by PT and recommended Acute Rehab for PT/OT; daughter in agreement; accepted to [facility]; await bed; updated PT requested and completed.    Vital Signs Last 24 Hrs  T(C): 36.4 (23 Jan 2025 13:57), Max: 36.6 (22 Jan 2025 19:57)  T(F): 97.6 (23 Jan 2025 13:57), Max: 97.9 (22 Jan 2025 19:57)  HR: 84 (23 Jan 2025 13:57) (66 - 96)  BP: 99/64 (23 Jan 2025 13:57) (95/54 - 145/81)  RR: 18 (23 Jan 2025 13:57) (18 - 19)  SpO2: 94% (23 Jan 2025 13:57) (92% - 96%): room air      P/E:  Psych: AAO x2; stable mood slightly delirious today  Neuro: No gross new focal deficits; Power and sensation intact  CVS: S1S2 present, regular, no edema  Resp: BLAE+, No wheeze or Rhonchi  GI: Soft, BS+, Non tender, non distended  Extr: No  calf tenderness B/L Lower extremities  Skin: Warm and moist without any rashes    Labs and Imaging reviewed: no new stable 1/22/25; repeat AM          MR Head No Cont (01.20.25 @ 14:17)   This exam is compared with prior head CT performed on January 16, 2025  Parenchymal volume loss and chronic microvessel ischemic changes are identified.    There is abnormalT2 prolongation with restricted diffusion seen involving the right basal ganglia/anterior coronal radiata region. This is compatible with an acute infarct. No hemorrhagic transformation is seen. No significant shift or herniation is seen. Abnormal T2 prolongation is seen involving the medial left occipital/inferior parietal cortex. These findings do demonstrate mild increased signal on the diffusion weighted sequence as well as minimal decreased signal on the ADC mapping. These findings could be compatible with subacute infarcts. No hemorrhagic transformation is seen. No significant shift or herniation is seen.    Left frontal extra-axial lesion is seen (7-16) this finding measures approximately 0.6 cm and could be compatible with a small meningioma.   Contrast enhanced MRI of the brain can be done for further evaluation.  The large vessels demonstrate normal flow voids. The paranasal sinuses appear clear  Inflammatory change involving both mastoid regions left greater than right.  Patient is status post bilateral cataract surgery.    IMPRESSION: Infarcts as described above.        A/P:  #Acute CVA Right basal ganglia and Corona Radiata   Subacute infarct left occipital/ inferior parietal cortex  #A. fib intermittent RVR   #Hx Depression with Anxiety    Plan:  PT re-eval appreciated; still recommend Acute Rehab which will also provide OT in this healthy 89 year old    reached out to Daughter; [facility] choice provided; as per  does not need Auth  Patient accepted; awaiting bed   No dysphagia; regular diet tolerated  Continue Eliquis and Statin; stroke in multiple areas concerning for embolic stroke in the setting of underlying A. Fib  TTE = EF 55-60% otherwise normal  Continue bowel regimen: Miralax, senna, add bisacodyl and lactulose prn and use for goal of 1 to 2 BM per day   Labs stable; repeat AM; if delirium and disorientation persist will consider CT Head as pt on new a/c  Rest as per ACP above; d/w SHON Perez  Discussed with Patient; d/w GLADIS Hubbard over the phone this evening and updated; she also felt patient was slightly disoriented; monitor mental status closely d/w ACP  Discussed with Housestaff, PT,  and RN; medically stable for Rehab

## 2025-01-24 NOTE — PROGRESS NOTE ADULT - PROBLEM SELECTOR PLAN 4
- hx of afib on verapamil 120 bid and atenolol 20 qd  - not on AC at home  - continue atenolol to 50mg qd  - c/w eliquis 2.5mg bid

## 2025-01-24 NOTE — PROGRESS NOTE ADULT - PROBLEM SELECTOR PROBLEM 4
Constipation
Paroxysmal atrial fibrillation
Paroxysmal atrial fibrillation
Constipation

## 2025-01-24 NOTE — PROGRESS NOTE ADULT - PROBLEM SELECTOR PLAN 1
- pt was found on the floor in her apartment by neighbor  - [facility] showing subacute infarct  - EKG showed tachy w RBBB  - out of window for TNK  - lipid panel: chol 57, tg 58, hdl 24, ldl 19  - A1c 5.8  - TTE: LVEF 55-60%, severe pHTN, mod-severe TR, neg PFO  - MRI: acute infarct in R basal ganglia/anterior corona radiata, subacute medial L occipital/inferior parietal region, L frontal extra-axial lesion, possibly meningioma  - continue on Eliquis, no neuro need for statin  - Neuro Dr. Longo following  - Patient can follow up with general neurology at [address] [place] [address],  1-2 weeks after discharge. Please instruct the patient to call [phone]  to schedule this appointment.

## 2025-01-24 NOTE — DIETITIAN INITIAL EVALUATION ADULT - NSFNSPHYEXAMSKINFT_GEN_A_CORE
Pressure Injury 1: sacrum, Stage I  Pressure Injury 2: Left:, buttocks, Stage I        Pressure Injury 11: none, none, Pressure Injury 1: sacrum, Stage I  Pressure Injury 2: Left:, buttocks, Stage I

## 2025-01-24 NOTE — DIETITIAN INITIAL EVALUATION ADULT - ENTER TO (CAL/KG)
30 Pt 55 year old male, A/O x4. Pt came in due to sore throat. PMH- HTN, DM. As per pt, he had sore throat x 2 days. Unable to tolerate food, able to take small sips of fluids due to severe pain. Upon assessment, pt grimace while talking and has horsed voice. Skin- warm, dry, intact. Denies fever, chills, bloody emesis, n/v/d, ha, numbness/ tingling.

## 2025-01-24 NOTE — PROGRESS NOTE ADULT - ASSESSMENT
89F with a PMHx of paroxysmal Afib, SVT, HLD, rheumatoid arthritis; Mycobacterium avium complex admitted for acute encephalopathy and subacute CVA.

## 2025-01-24 NOTE — PROGRESS NOTE ADULT - PROBLEM SELECTOR PLAN 2
- patient found on the floor at home  - [facility] showing subacute infarct  - 2/2 CVA  - sepsis w/u negative  - pt now at her baseline mental status  RESOLVED

## 2025-01-24 NOTE — PROGRESS NOTE ADULT - PROBLEM SELECTOR PLAN 6
unclear if being treated
unclear if being treated
lovenox
unclear if being treated
unclear if being treated
DVT ppx: Lovenox

## 2025-01-24 NOTE — DIETITIAN INITIAL EVALUATION ADULT - MALNUTRITION
Severe Malnutrition in the context of chronic illness evidenced by moderate fat and muscle depletion, inadequate oral intake related to advanced age, lifestyle and multiple medical problems causing decreased appetite.

## 2025-01-24 NOTE — PROGRESS NOTE ADULT - PROBLEM SELECTOR PROBLEM 5
Paroxysmal atrial fibrillation
Mycobacterium avium complex
Paroxysmal atrial fibrillation
Mycobacterium avium complex
Paroxysmal atrial fibrillation
Paroxysmal atrial fibrillation

## 2025-01-24 NOTE — PROGRESS NOTE ADULT - SUBJECTIVE AND OBJECTIVE BOX
NP Note discussed with  Primary Attending    Patient is a 89y old  Female who presents with a chief complaint of acute encephalopathy , subacute stroke (23 Jan 2025 10:52)      INTERVAL HPI/OVERNIGHT EVENTS: no new complaints    MEDICATIONS  (STANDING):  apixaban 2.5 milliGRAM(s) Oral every 12 hours  atenolol  Tablet 50 milliGRAM(s) Oral daily  atorvastatin 40 milliGRAM(s) Oral daily  bisacodyl 5 milliGRAM(s) Oral at bedtime  escitalopram 20 milliGRAM(s) Oral daily  polyethylene glycol 3350 17 Gram(s) Oral daily  senna 2 Tablet(s) Oral at bedtime    MEDICATIONS  (PRN):  acetaminophen     Tablet .. 650 milliGRAM(s) Oral every 6 hours PRN Temp greater or equal to 38C (100.4F), Mild Pain (1 - 3)  albuterol    90 MICROgram(s) HFA Inhaler 2 Puff(s) Inhalation every 6 hours PRN for bronchospasm  aluminum hydroxide/magnesium hydroxide/simethicone Suspension 30 milliLiter(s) Oral every 4 hours PRN Dyspepsia  lactulose Syrup 10 Gram(s) Oral daily PRN constipation  melatonin 3 milliGRAM(s) Oral at bedtime PRN Insomnia  ondansetron Injectable 4 milliGRAM(s) IV Push every 8 hours PRN Nausea and/or Vomiting      __________________________________________________  REVIEW OF SYSTEMS:    CONSTITUTIONAL: No fever,   EYES: no acute visual disturbances  NECK: No pain or stiffness  RESPIRATORY: No cough; No shortness of breath  CARDIOVASCULAR: No chest pain, no palpitations  GASTROINTESTINAL: No pain. No nausea or vomiting; No diarrhea   NEUROLOGICAL: No headache or numbness, no tremors  MUSCULOSKELETAL: No joint pain, no muscle pain  GENITOURINARY: no dysuria, no frequency, no hesitancy  PSYCHIATRY: no depression , no anxiety  ALL OTHER  ROS negative        Vital Signs Last 24 Hrs  T(C): 36.7 (24 Jan 2025 05:01), Max: 36.7 (24 Jan 2025 05:01)  T(F): 98 (24 Jan 2025 05:01), Max: 98 (24 Jan 2025 05:01)  HR: 76 (24 Jan 2025 10:40) (59 - 88)  BP: 121/60 (24 Jan 2025 10:40) (99/64 - 125/68)  BP(mean): 87 (24 Jan 2025 05:01) (87 - 87)  RR: 17 (24 Jan 2025 05:01) (17 - 18)  SpO2: 91% (24 Jan 2025 10:40) (89% - 95%)    Parameters below as of 24 Jan 2025 10:40  Patient On (Oxygen Delivery Method): room air        ________________________________________________  PHYSICAL EXAM:  GENERAL: NAD  HEENT: Normocephalic;  conjunctivae and sclerae clear; moist mucous membranes;   NECK : supple  CHEST/LUNG: Clear to auscultation bilaterally with good air entry   HEART: S1 S2  regular; no murmurs, gallops or rubs  ABDOMEN: Soft, Nontender, Nondistended; Bowel sounds present  EXTREMITIES: no cyanosis; no edema; no calf tenderness  SKIN: warm and dry; no rash  NERVOUS SYSTEM:  Awake and alert; Oriented  to place, person and time ; no new deficits    _________________________________________________  LABS:              CAPILLARY BLOOD GLUCOSE            RADIOLOGY & ADDITIONAL TESTS:    Imaging Personally Reviewed:  YES/NO    Consultant(s) Notes Reviewed:   YES/ No    Care Discussed with Consultants :     Plan of care was discussed with patient and /or primary care giver; all questions and concerns were addressed and care was aligned with patient's wishes.     NP Note discussed with  Primary Attending    Patient is a 89y old  Female who presents with a chief complaint of acute encephalopathy , subacute stroke (23 Jan 2025 10:52)      INTERVAL HPI/OVERNIGHT EVENTS:  89F with a PMH of paroxysmal Afib, SVT, HLD, rheumatoid arthritis; Mycobacterium avium complex admitted for acute encephalopathy and subacute CVA.  Patient is AOx3 at baseline, living alone. Patient was last seen 2 days ago and may have had a doctors appointment with Dr Rhodes, her PCP. Patient was found by her neighbor on the floor in her apartment.    CXR showed Bilateral perihilar linear scarring/possible upper lobe volume loss. XR pelvis showed no fracture. CTH showed acute vs early subacute infarct involving the right caudate nucleus, right corona radiata and right lentiform nucleus. MR head showed acute infarct involving the right basal ganglia/anterior coronal radiata region  Echo showed EF 55-60% with severe pulm HTN. mod to severe TR, negative for intracardiac shunt     Neuro following, continue with AC, no need for statin as per neuro. Patient can follow up with general neurology at [address] [place] [address],  1-2 weeks after discharge. Please instruct the patient to call [phone]  to schedule this appointment.  Cardiology following for Pafib, continue on atenolol  PT recs MADDI    Pt is comfortable in bed, does not look in respiratory distress    MEDICATIONS  (STANDING):  apixaban 2.5 milliGRAM(s) Oral every 12 hours  atenolol  Tablet 50 milliGRAM(s) Oral daily  atorvastatin 40 milliGRAM(s) Oral daily  bisacodyl 5 milliGRAM(s) Oral at bedtime  escitalopram 20 milliGRAM(s) Oral daily  polyethylene glycol 3350 17 Gram(s) Oral daily  senna 2 Tablet(s) Oral at bedtime    MEDICATIONS  (PRN):  acetaminophen     Tablet .. 650 milliGRAM(s) Oral every 6 hours PRN Temp greater or equal to 38C (100.4F), Mild Pain (1 - 3)  albuterol    90 MICROgram(s) HFA Inhaler 2 Puff(s) Inhalation every 6 hours PRN for bronchospasm  aluminum hydroxide/magnesium hydroxide/simethicone Suspension 30 milliLiter(s) Oral every 4 hours PRN Dyspepsia  lactulose Syrup 10 Gram(s) Oral daily PRN constipation  melatonin 3 milliGRAM(s) Oral at bedtime PRN Insomnia  ondansetron Injectable 4 milliGRAM(s) IV Push every 8 hours PRN Nausea and/or Vomiting      __________________________________________________  REVIEW OF SYSTEMS:    CONSTITUTIONAL: No fever,   EYES: no acute visual disturbances  NECK: No pain or stiffness  RESPIRATORY: No cough; No shortness of breath  CARDIOVASCULAR: No chest pain, no palpitations  GASTROINTESTINAL: No pain. No nausea or vomiting; No diarrhea   NEUROLOGICAL: No headache or numbness, no tremors  MUSCULOSKELETAL: No joint pain, no muscle pain  GENITOURINARY: no dysuria, no frequency, no hesitancy  ALL OTHER  ROS negative        Vital Signs Last 24 Hrs  T(C): 36.7 (24 Jan 2025 05:01), Max: 36.7 (24 Jan 2025 05:01)  T(F): 98 (24 Jan 2025 05:01), Max: 98 (24 Jan 2025 05:01)  HR: 76 (24 Jan 2025 10:40) (59 - 88)  BP: 121/60 (24 Jan 2025 10:40) (99/64 - 125/68)  BP(mean): 87 (24 Jan 2025 05:01) (87 - 87)  RR: 17 (24 Jan 2025 05:01) (17 - 18)  SpO2: 91% (24 Jan 2025 10:40) (89% - 95%)    Parameters below as of 24 Jan 2025 10:40  Patient On (Oxygen Delivery Method): room air        ________________________________________________  PHYSICAL EXAM:  GENERAL: NAD, frail  HEENT: Normocephalic; moist mucous membranes;   NECK : supple  CHEST/LUNG: Rhonchi   HEART: S1 S2  regular;  ABDOMEN: Soft, Nontender, Nondistended; Bowel sounds present  EXTREMITIES: no cyanosis; no edema; no calf tenderness  SKIN: warm and dry; no rash  NERVOUS SYSTEM:  Awake and alert; Oriented to person and time    _________________________________________________  LABS:              CAPILLARY BLOOD GLUCOSE            RADIOLOGY & ADDITIONAL TESTS:  < from: Xray Chest 1 View-PORTABLE IMMEDIATE (Xray Chest 1 View-PORTABLE IMMEDIATE .) (01.24.25 @ 02:35) >    ACC: [id number] EXAM:  XR CHEST PORTABLE IMMED 1V   ORDERED BY: ALONZO SANCHEZ     PROCEDURE DATE:  01/24/2025          INTERPRETATION:  Desaturating.    AP chest. Prior 1/16/2025.     Stable heart mediastinum. bilateral perihilar scarring/atelectasis   similar to prior. No consolidation significant pleural effusion or   pneumothorax.    IMPRESSION: No acute change from prior follow-up with chest CT as   previously recommended    --- End of Report ---            GERMANIA BRUNER MD; Attending Radiologist  This document has been electronically signed. Jan 24 2025  2:06PM    < end of copied text >    Imaging Personally Reviewed:  YES/NO    Consultant(s) Notes Reviewed:   YES/ No    Care Discussed with Consultants :     Plan of care was discussed with patient and /or primary care giver; all questions and concerns were addressed and care was aligned with patient's wishes.

## 2025-01-24 NOTE — PROGRESS NOTE ADULT - SUBJECTIVE AND OBJECTIVE BOX
PATIENT SEEN AND EXAMINED BY TAMI AN M.D. ON :- 1/24/25  DATE OF SERVICE:    1/24/25         Interim events noted,Labs ,Radiological studies and Cardiology tests reviewed .    Patient is a 89y old  Female who presents with a chief complaint of Cerebral infarction     (24 Jan 2025 11:44)      HPI:  89F from home walks with cane PMH of paroxysmal Afib, SVT, HLD, rheumatoid arthritis; Mycobacterium avium complex presented with acute mental status changes. Patient is AOx3 at baseline, living alone. Collateral obtained by niece. Patient was last seen 2 days ago and may have had a doctors appointment with Dr Rhodes, her PCP. Patient was found by her neighbor on the floor in her apartment.     (17 Jan 2025 06:49)      PAST MEDICAL & SURGICAL HISTORY:  Atrial fibrillation      Rheumatoid arthritis      Paroxysmal atrial fibrillation      HTN (hypertension)      JACINTA (mycobacterium avium-intracellulare)      No significant past surgical history          PREVIOUS DIAGNOSTIC TESTING:      ECHO  FINDINGS:    STRESS  FINDINGS:    CATHETERIZATION  FINDINGS:    MEDICATIONS  (STANDING):  apixaban 2.5 milliGRAM(s) Oral every 12 hours  atenolol  Tablet 50 milliGRAM(s) Oral daily  atorvastatin 40 milliGRAM(s) Oral daily  bisacodyl 5 milliGRAM(s) Oral at bedtime  escitalopram 20 milliGRAM(s) Oral daily  polyethylene glycol 3350 17 Gram(s) Oral daily  senna 2 Tablet(s) Oral at bedtime    MEDICATIONS  (PRN):  acetaminophen     Tablet .. 650 milliGRAM(s) Oral every 6 hours PRN Temp greater or equal to 38C (100.4F), Mild Pain (1 - 3)  albuterol    90 MICROgram(s) HFA Inhaler 2 Puff(s) Inhalation every 6 hours PRN for bronchospasm  aluminum hydroxide/magnesium hydroxide/simethicone Suspension 30 milliLiter(s) Oral every 4 hours PRN Dyspepsia  lactulose Syrup 10 Gram(s) Oral daily PRN constipation  melatonin 3 milliGRAM(s) Oral at bedtime PRN Insomnia  ondansetron Injectable 4 milliGRAM(s) IV Push every 8 hours PRN Nausea and/or Vomiting      FAMILY HISTORY:  No pertinent family history in first degree relatives        SOCIAL HISTORY:    CIGARETTES:    ALCOHOL:    REVIEW OF SYSTEMS:  CONSTITUTIONAL: No fever, weight loss, or fatigue  EYES: No eye pain, visual disturbances, or discharge  ENMT:  No difficulty hearing, tinnitus, vertigo; No sinus or throat pain  NECK: No pain or stiffness  RESPIRATORY: No cough, wheezing, chills or hemoptysis; No shortness of breath  CARDIOVASCULAR: No chest pain, palpitations, dizziness, or leg swelling  GASTROINTESTINAL: No abdominal or epigastric pain. No nausea, vomiting, or hematemesis; No diarrhea or constipation. No melena or hematochezia.  GENITOURINARY: No dysuria, frequency, hematuria, or incontinence  NEUROLOGICAL: No headaches, memory loss, loss of strength, numbness, or tremors  SKIN: No itching, burning, rashes, or lesions   LYMPH NODES: No enlarged glands  ENDOCRINE: No heat or cold intolerance; No hair loss  MUSCULOSKELETAL: No joint pain or swelling; No muscle, back, or extremity pain  PSYCHIATRIC: No depression, anxiety, mood swings, or difficulty sleeping  HEME/LYMPH: No easy bruising, or bleeding gums  ALLERY AND IMMUNOLOGIC: No hives or eczema    Vital Signs Last 24 Hrs  T(C): 36.3 (24 Jan 2025 20:24), Max: 36.7 (24 Jan 2025 05:01)  T(F): 97.3 (24 Jan 2025 20:24), Max: 98 (24 Jan 2025 05:01)  HR: 94 (24 Jan 2025 20:24) (59 - 94)  BP: 128/79 (24 Jan 2025 20:24) (105/56 - 131/94)  BP(mean): 107 (24 Jan 2025 13:30) (87 - 107)  RR: 18 (24 Jan 2025 20:24) (17 - 18)  SpO2: 90% (24 Jan 2025 20:24) (90% - 95%)    Parameters below as of 24 Jan 2025 20:24  Patient On (Oxygen Delivery Method): room air          PHYSICAL EXAM:  GENERAL: NAD, well-groomed, well-developed  HEAD:  Atraumatic, Normocephalic  EYES: EOMI, PERRLA, conjunctiva and sclera clear  ENMT: No tonsillar erythema, exudates, or enlargement; Moist mucous membranes, Good dentition, No lesions  NECK: Supple, No JVD, Normal thyroid  NERVOUS SYSTEM:  Alert & Oriented X3, Good concentration; Motor Strength 5/5 B/L upper and lower extremities; DTRs 2+ intact and symmetric  CHEST/LUNG: Clear to percussion bilaterally; No rales, rhonchi, wheezing, or rubs  HEART: Regular rate and rhythm; No murmurs, rubs, or gallops  ABDOMEN: Soft, Nontender, Nondistended; Bowel sounds present  EXTREMITIES:  2+ Peripheral Pulses, No clubbing, cyanosis, or edema  LYMPH: No lymphadenopathy noted  SKIN: No rashes or lesions      INTERPRETATION OF TELEMETRY:    ECG:    CHADSVASC:     LABS:                  Lipid Panel:   I&O's Summary    23 Jan 2025 07:01  -  24 Jan 2025 07:00  --------------------------------------------------------  IN: 200 mL / OUT: 1125 mL / NET: -925 mL    24 Jan 2025 07:01  -  24 Jan 2025 22:35  --------------------------------------------------------  IN: 300 mL / OUT: 0 mL / NET: 300 mL        RADIOLOGY & ADDITIONAL STUDIES:    < from: TTE W or WO Ultrasound Enhancing Agent (01.17.25 @ 13:23) >  CONCLUSIONS:      1. Left ventricular systolic function is normal with an ejection fraction visually estimated at 55 to 60 %.   2. There is normal LV mass and normal geometry.   3. The right ventricle is not well visualized. probably enlarged right ventricular cavity size and normal right ventricular systolic function.   4. Agitated saline injection was negative for intracardiac shunt.   5. Estimated pulmonary artery systolic pressure is 62 mmHg, consistent with severe pulmonary hypertension.   6. Moderate to severe tricuspid regurgitation.    < end of copied text >

## 2025-01-24 NOTE — PROGRESS NOTE ADULT - PROBLEM SELECTOR PROBLEM 6
Mycobacterium avium complex
Prophylactic measure
Mycobacterium avium complex
Prophylactic measure

## 2025-01-24 NOTE — PROGRESS NOTE ADULT - PROBLEM SELECTOR PLAN 7
DVT ppx: lovenox
pt is accepted to [facility] pending bed availability
DVT ppx: lovenox

## 2025-01-24 NOTE — DIETITIAN INITIAL EVALUATION ADULT - OTHER INFO
Patient has dx of HTN, Paroxysmal Atrial Fib, sepsis, rheumatoid arthritis, CVA, constipation, metabolic encephalopathy, mycobacterium avium complex, acute encephalopathy, A Fib with rapid ventricular response.

## 2025-01-24 NOTE — DIETITIAN INITIAL EVALUATION ADULT - ORAL INTAKE PTA/DIET HISTORY
Patient is alert and was able to provide information for the assessment. She said that she does not have a good appetite. She dislikes supplements but will eat puddings especially rice pudding. She likes ice cream as well. No other food preferences communicated, She is aware that she has been losing weight because her face is wrinkling.

## 2025-01-24 NOTE — DIETITIAN NUTRITION RISK NOTIFICATION - ADDITIONAL COMMENTS/DIETITIAN RECOMMENDATIONS
Continue current diet, provide puddings and ice cream with meals, check weights, labs and intake. Nursing to assist and encourage patient to eat.

## 2025-01-24 NOTE — DIETITIAN INITIAL EVALUATION ADULT - PERTINENT MEDS FT
MEDICATIONS  (STANDING):  apixaban 2.5 milliGRAM(s) Oral every 12 hours  atenolol  Tablet 50 milliGRAM(s) Oral daily  atorvastatin 40 milliGRAM(s) Oral daily  bisacodyl 5 milliGRAM(s) Oral at bedtime  escitalopram 20 milliGRAM(s) Oral daily  polyethylene glycol 3350 17 Gram(s) Oral daily  senna 2 Tablet(s) Oral at bedtime    MEDICATIONS  (PRN):  acetaminophen     Tablet .. 650 milliGRAM(s) Oral every 6 hours PRN Temp greater or equal to 38C (100.4F), Mild Pain (1 - 3)  albuterol    90 MICROgram(s) HFA Inhaler 2 Puff(s) Inhalation every 6 hours PRN for bronchospasm  aluminum hydroxide/magnesium hydroxide/simethicone Suspension 30 milliLiter(s) Oral every 4 hours PRN Dyspepsia  lactulose Syrup 10 Gram(s) Oral daily PRN constipation  melatonin 3 milliGRAM(s) Oral at bedtime PRN Insomnia  ondansetron Injectable 4 milliGRAM(s) IV Push every 8 hours PRN Nausea and/or Vomiting

## 2025-01-25 LAB
ALBUMIN SERPL ELPH-MCNC: 2.3 G/DL — LOW (ref 3.5–5)
ALP SERPL-CCNC: 81 U/L — SIGNIFICANT CHANGE UP (ref 40–120)
ALT FLD-CCNC: 30 U/L DA — SIGNIFICANT CHANGE UP (ref 10–60)
ANION GAP SERPL CALC-SCNC: 8 MMOL/L — SIGNIFICANT CHANGE UP (ref 5–17)
AST SERPL-CCNC: 60 U/L — HIGH (ref 10–40)
BILIRUB SERPL-MCNC: 1.3 MG/DL — HIGH (ref 0.2–1.2)
BUN SERPL-MCNC: 14 MG/DL — SIGNIFICANT CHANGE UP (ref 7–18)
CALCIUM SERPL-MCNC: 8.6 MG/DL — SIGNIFICANT CHANGE UP (ref 8.4–10.5)
CHLORIDE SERPL-SCNC: 102 MMOL/L — SIGNIFICANT CHANGE UP (ref 96–108)
CO2 SERPL-SCNC: 25 MMOL/L — SIGNIFICANT CHANGE UP (ref 22–31)
CREAT SERPL-MCNC: 0.57 MG/DL — SIGNIFICANT CHANGE UP (ref 0.5–1.3)
EGFR: 85 ML/MIN/1.73M2 — SIGNIFICANT CHANGE UP
GLUCOSE SERPL-MCNC: 100 MG/DL — HIGH (ref 70–99)
HCT VFR BLD CALC: 43.3 % — SIGNIFICANT CHANGE UP (ref 34.5–45)
HGB BLD-MCNC: 14.2 G/DL — SIGNIFICANT CHANGE UP (ref 11.5–15.5)
MAGNESIUM SERPL-MCNC: 1.9 MG/DL — SIGNIFICANT CHANGE UP (ref 1.6–2.6)
MCHC RBC-ENTMCNC: 32.4 PG — SIGNIFICANT CHANGE UP (ref 27–34)
MCHC RBC-ENTMCNC: 32.8 G/DL — SIGNIFICANT CHANGE UP (ref 32–36)
MCV RBC AUTO: 98.9 FL — SIGNIFICANT CHANGE UP (ref 80–100)
NRBC # BLD: 0 /100 WBCS — SIGNIFICANT CHANGE UP (ref 0–0)
NRBC BLD-RTO: 0 /100 WBCS — SIGNIFICANT CHANGE UP (ref 0–0)
PHOSPHATE SERPL-MCNC: 3 MG/DL — SIGNIFICANT CHANGE UP (ref 2.5–4.5)
PLATELET # BLD AUTO: 272 K/UL — SIGNIFICANT CHANGE UP (ref 150–400)
POTASSIUM SERPL-MCNC: 4.5 MMOL/L — SIGNIFICANT CHANGE UP (ref 3.5–5.3)
POTASSIUM SERPL-SCNC: 4.5 MMOL/L — SIGNIFICANT CHANGE UP (ref 3.5–5.3)
PROT SERPL-MCNC: 7.3 G/DL — SIGNIFICANT CHANGE UP (ref 6–8.3)
RBC # BLD: 4.38 M/UL — SIGNIFICANT CHANGE UP (ref 3.8–5.2)
RBC # FLD: 14 % — SIGNIFICANT CHANGE UP (ref 10.3–14.5)
SODIUM SERPL-SCNC: 135 MMOL/L — SIGNIFICANT CHANGE UP (ref 135–145)
WBC # BLD: 7.68 K/UL — SIGNIFICANT CHANGE UP (ref 3.8–10.5)
WBC # FLD AUTO: 7.68 K/UL — SIGNIFICANT CHANGE UP (ref 3.8–10.5)

## 2025-01-25 PROCEDURE — 99232 SBSQ HOSP IP/OBS MODERATE 35: CPT

## 2025-01-25 RX ORDER — IPRATROPIUM BROMIDE AND ALBUTEROL SULFATE .5; 2.5 MG/3ML; MG/3ML
3 SOLUTION RESPIRATORY (INHALATION) ONCE
Refills: 0 | Status: COMPLETED | OUTPATIENT
Start: 2025-01-25 | End: 2025-01-25

## 2025-01-25 RX ORDER — ACETYLCYSTEINE 200 MG/ML
4 VIAL (ML) MISCELLANEOUS ONCE
Refills: 0 | Status: COMPLETED | OUTPATIENT
Start: 2025-01-25 | End: 2025-01-25

## 2025-01-25 RX ORDER — BACTERIOSTATIC SODIUM CHLORIDE 0.9 %
1000 VIAL (ML) INJECTION
Refills: 0 | Status: DISCONTINUED | OUTPATIENT
Start: 2025-01-25 | End: 2025-01-27

## 2025-01-25 RX ORDER — HYDROCORTISONE 1 %
1 CREAM (GRAM) TOPICAL
Refills: 0 | Status: DISCONTINUED | OUTPATIENT
Start: 2025-01-25 | End: 2025-01-26

## 2025-01-25 RX ADMIN — Medication 2 TABLET(S): at 22:27

## 2025-01-25 RX ADMIN — Medication 4 MILLILITER(S): at 02:44

## 2025-01-25 RX ADMIN — Medication 60 MILLILITER(S): at 22:33

## 2025-01-25 RX ADMIN — Medication 1 APPLICATION(S): at 18:57

## 2025-01-25 RX ADMIN — Medication 5 MILLIGRAM(S): at 22:28

## 2025-01-25 RX ADMIN — Medication 50 MILLIGRAM(S): at 06:16

## 2025-01-25 RX ADMIN — IPRATROPIUM BROMIDE AND ALBUTEROL SULFATE 3 MILLILITER(S): .5; 2.5 SOLUTION RESPIRATORY (INHALATION) at 02:44

## 2025-01-25 RX ADMIN — APIXABAN 2.5 MILLIGRAM(S): 5 TABLET, FILM COATED ORAL at 10:55

## 2025-01-25 RX ADMIN — APIXABAN 2.5 MILLIGRAM(S): 5 TABLET, FILM COATED ORAL at 22:28

## 2025-01-25 RX ADMIN — ESCITALOPRAM 20 MILLIGRAM(S): 10 TABLET, FILM COATED ORAL at 14:42

## 2025-01-25 RX ADMIN — ATORVASTATIN CALCIUM 40 MILLIGRAM(S): 80 TABLET, FILM COATED ORAL at 22:28

## 2025-01-25 NOTE — PROGRESS NOTE ADULT - ASSESSMENT
A/P:  #Acute CVA Right basal ganglia and Corona Radiata   Subacute infarct left occipital/ inferior parietal cortex  #A. fib intermittent RVR   #Hx Depression with Anxiety    Plan:  PT re-eval appreciated; still recommend Acute Rehab which will also provide OT in this healthy 89 year old    reached out to Daughter; [facility] choice provided; as per  does not need Auth  Patient accepted; awaiting bed   No dysphagia; regular diet tolerated  Continue Eliquis and Statin; stroke in multiple areas concerning for embolic stroke in the setting of underlying A. Fib  TTE = EF 55-60% otherwise normal  Continue bowel regimen: Miralax, senna, add bisacodyl and lactulose prn and use for goal of 1 to 2 BM per day   Labs stable; repeat AM; if delirium and disorientation persist will consider CT Head as pt on new a/c  Rest as per ACP above; d/w NP Ana  Discussed with Patient; d/w daughter Haydee over the phone this evening and updated; she also felt patient was slightly disoriented; monitor mental status closely d/w ACP  Discussed with Housestaff, PT,  and RN; medically stable for Rehab. A/P:  #Acute CVA Right basal ganglia and Corona Radiata   #Subacute infarct left occipital/ inferior parietal cortex  #Acute delirium possibly dehydration resolved  #A. fib intermittent RVR, stable HR  #Hx Depression with Anxiety    Plan:  PT re-eval appreciated; recommend Acute Rehab which will also provide OT  Patient accepted; awaiting bed   No dysphagia; regular diet tolerated  OOB to chair d/w N rosetta this evening  Continue Eliquis and Statin; stroke in multiple areas concerning for embolic stroke in the setting of underlying A. Fib  TTE = EF 55-60% otherwise normal  Continue bowel regimen: Miralax, senna, add bisacodyl and lactulose prn and use for goal of 1 to 2 BM per day   Labs stable; no need to repeat daily;   Dscuss with ACP covering  Discussed with Patient; d/w daughter Haydee over the phone this evening and updated clinical improvement   Discussed with  Marcela; calls made this morning and afternoon; no bed available still  Discussed with RN; medically stable for Rehab. A/P:  #Acute CVA Right basal ganglia and Corona Radiata   #Subacute infarct left occipital/ inferior parietal cortex  #Acute delirium possibly dehydration resolved  #A. fib intermittent RVR, stable HR  #Hx Depression with Anxiety    Plan:  PT re-eval appreciated; recommend Acute Rehab which will also provide OT  Patient accepted; awaiting bed   No dysphagia; regular diet tolerated  OOB to chair d/w N rosetta this evening  Continue Eliquis and Statin; stroke in multiple areas concerning for embolic stroke in the setting of underlying A. Fib  TTE = EF 55-60% otherwise normal  Continue bowel regimen: Miralax, senna, add bisacodyl and lactulose prn and use for goal of 1 to 2 BM per day   Labs stable; no need to repeat daily;   Dscuss with ACP covering  Discussed with Patient; d/w GLADIS Hubbard over the phone this evening and updated clinical improvement   Discussed with  Marcela; calls made this morning and afternoon; no bed available still  Discussed with RN; medically stable for Rehab.

## 2025-01-25 NOTE — PROGRESS NOTE ADULT - SUBJECTIVE AND OBJECTIVE BOX
PATIENT SEEN AND EXAMINED BY TAMI AN M.D. ON :- 1/25/25  DATE OF SERVICE:   1/25/25          Interim events noted,Labs ,Radiological studies and Cardiology tests reviewed .    Patient is a 89y old  Female who presents with a chief complaint of acute encephalopathy , subacute stroke (25 Jan 2025 17:36)      HPI:  89F from home walks with cane PMH of paroxysmal Afib, SVT, HLD, rheumatoid arthritis; Mycobacterium avium complex presented with acute mental status changes. Patient is AOx3 at baseline, living alone. Collateral obtained by niece. Patient was last seen 2 days ago and may have had a doctors appointment with Dr Rhodes, her PCP. Patient was found by her neighbor on the floor in her apartment.     (17 Jan 2025 06:49)      PAST MEDICAL & SURGICAL HISTORY:  Atrial fibrillation      Rheumatoid arthritis      Paroxysmal atrial fibrillation      HTN (hypertension)      JACINTA (mycobacterium avium-intracellulare)      No significant past surgical history          PREVIOUS DIAGNOSTIC TESTING:      ECHO  FINDINGS:    STRESS  FINDINGS:    CATHETERIZATION  FINDINGS:    MEDICATIONS  (STANDING):  apixaban 2.5 milliGRAM(s) Oral every 12 hours  atenolol  Tablet 50 milliGRAM(s) Oral daily  atorvastatin 40 milliGRAM(s) Oral daily  bisacodyl 5 milliGRAM(s) Oral at bedtime  escitalopram 20 milliGRAM(s) Oral daily  hydrocortisone 1% Cream 1 Application(s) Topical two times a day  polyethylene glycol 3350 17 Gram(s) Oral daily  senna 2 Tablet(s) Oral at bedtime  sodium chloride 0.9%. 1000 milliLiter(s) (60 mL/Hr) IV Continuous <Continuous>    MEDICATIONS  (PRN):  acetaminophen     Tablet .. 650 milliGRAM(s) Oral every 6 hours PRN Temp greater or equal to 38C (100.4F), Mild Pain (1 - 3)  albuterol    90 MICROgram(s) HFA Inhaler 2 Puff(s) Inhalation every 6 hours PRN for bronchospasm  aluminum hydroxide/magnesium hydroxide/simethicone Suspension 30 milliLiter(s) Oral every 4 hours PRN Dyspepsia  lactulose Syrup 10 Gram(s) Oral daily PRN constipation  melatonin 3 milliGRAM(s) Oral at bedtime PRN Insomnia  ondansetron Injectable 4 milliGRAM(s) IV Push every 8 hours PRN Nausea and/or Vomiting      FAMILY HISTORY:  No pertinent family history in first degree relatives        SOCIAL HISTORY:    CIGARETTES:    ALCOHOL:    REVIEW OF SYSTEMS:  CONSTITUTIONAL: No fever, weight loss, or fatigue  EYES: No eye pain, visual disturbances, or discharge  ENMT:  No difficulty hearing, tinnitus, vertigo; No sinus or throat pain  NECK: No pain or stiffness  RESPIRATORY: No cough, wheezing, chills or hemoptysis; No shortness of breath  CARDIOVASCULAR: No chest pain, palpitations, dizziness, or leg swelling  GASTROINTESTINAL: No abdominal or epigastric pain. No nausea, vomiting, or hematemesis; No diarrhea or constipation. No melena or hematochezia.  GENITOURINARY: No dysuria, frequency, hematuria, or incontinence  NEUROLOGICAL: No headaches, memory loss, loss of strength, numbness, or tremors  SKIN: No itching, burning, rashes, or lesions   LYMPH NODES: No enlarged glands  ENDOCRINE: No heat or cold intolerance; No hair loss  MUSCULOSKELETAL: No joint pain or swelling; No muscle, back, or extremity pain  PSYCHIATRIC: No depression, anxiety, mood swings, or difficulty sleeping  HEME/LYMPH: No easy bruising, or bleeding gums  ALLERY AND IMMUNOLOGIC: No hives or eczema    Vital Signs Last 24 Hrs  T(C): 36.3 (25 Jan 2025 20:07), Max: 36.5 (25 Jan 2025 05:36)  T(F): 97.4 (25 Jan 2025 20:07), Max: 97.7 (25 Jan 2025 05:36)  HR: 94 (25 Jan 2025 20:07) (86 - 103)  BP: 113/79 (25 Jan 2025 20:07) (113/79 - 138/84)  BP(mean): --  RR: 18 (25 Jan 2025 20:07) (18 - 18)  SpO2: 96% (25 Jan 2025 20:07) (94% - 96%)    Parameters below as of 25 Jan 2025 20:07  Patient On (Oxygen Delivery Method): nasal cannula  O2 Flow (L/min): 2        PHYSICAL EXAM:  GENERAL: NAD, well-groomed, well-developed  HEAD:  Atraumatic, Normocephalic  EYES: EOMI, PERRLA, conjunctiva and sclera clear  ENMT: No tonsillar erythema, exudates, or enlargement; Moist mucous membranes, Good dentition, No lesions  NECK: Supple, No JVD, Normal thyroid  NERVOUS SYSTEM:  Alert & Oriented X3, Good concentration; Motor Strength 5/5 B/L upper and lower extremities; DTRs 2+ intact and symmetric  CHEST/LUNG: Clear to percussion bilaterally; No rales, rhonchi, wheezing, or rubs  HEART: Regular rate and rhythm; No murmurs, rubs, or gallops  ABDOMEN: Soft, Nontender, Nondistended; Bowel sounds present  EXTREMITIES:  2+ Peripheral Pulses, No clubbing, cyanosis, or edema  LYMPH: No lymphadenopathy noted  SKIN: No rashes or lesions      INTERPRETATION OF TELEMETRY:    ECG:    [facility]     LABS:                        14.2   7.68  )-----------( 272      ( 25 Jan 2025 05:55 )             43.3     01-25    135  |  102  |  14  ----------------------------<  100[H]  4.5   |  25  |  0.57    Ca    8.6      25 Jan 2025 05:55  Phos  3.0     01-25  Mg     1.9     01-25    TPro  7.3  /  Alb  2.3[L]  /  TBili  1.3[H]  /  DBili  x   /  AST  60[H]  /  ALT  30  /  AlkPhos  81  01-25          Urinalysis Basic - ( 25 Jan 2025 05:55 )    Color: x / Appearance: x / SG: x / pH: x  Gluc: 100 mg/dL / Ketone: x  / Bili: x / Urobili: x   Blood: x / Protein: x / Nitrite: x   Leuk Esterase: x / RBC: x / WBC x   Sq Epi: x / Non Sq Epi: x / Bacteria: x      Lipid Panel:   I&O's Summary    24 Jan 2025 07:01  -  25 Jan 2025 07:00  --------------------------------------------------------  IN: 300 mL / OUT: 0 mL / NET: 300 mL    25 Jan 2025 07:01  -  25 Jan 2025 22:50  --------------------------------------------------------  IN: 0 mL / OUT: 200 mL / NET: -200 mL        RADIOLOGY & ADDITIONAL STUDIES:    < from: TTE W or WO Ultrasound Enhancing Agent (01.17.25 @ 13:23) >  CONCLUSIONS:      1. Left ventricular systolic function is normal with an ejection fraction visually estimated at 55 to 60 %.   2. There is normal LV mass and normal geometry.   3. The right ventricle is not well visualized. probably enlarged right ventricular cavity size and normal right ventricular systolic function.   4. Agitated saline injection was negative for intracardiac shunt.   5. Estimated pulmonary artery systolic pressure is 62 mmHg, consistent with severe pulmonary hypertension.   6. Moderate to severe tricuspid regurgitation.    < end of copied text >

## 2025-01-25 NOTE — PROGRESS NOTE ADULT - SUBJECTIVE AND OBJECTIVE BOX
Patient seen and examined this afternoon    89 yo F with PMH of Paroxysmal A-fib, SVT, HLD brought from home confused and altered after she was found down on floor > 24 hours after she was last seen in her normal functioning state. Work up in the ED shows  hypoxic-ischemic encephalopathy likely related to possible subacute infarct in the right basal ganglia admitted for further management of possible acute stroke. MRI showing a new acute stroke in addition to subacute infarct in the setting of PAF. Patient initially refused anticoagulation but has now agreed    Patient is doing okay; comfortable in bed; reports being in a restaurant. noted to be eating dinner well.  Patient was seen by PT and recommended Acute Rehab for PT/OT; daughter in agreement; accepted to [facility]; await bed; updated PT requested and completed.    Vital Signs Last 24 Hrs  T(C): 36.2 (25 Jan 2025 13:28), Max: 36.5 (25 Jan 2025 05:36)  T(F): 97.2 (25 Jan 2025 13:28), Max: 97.7 (25 Jan 2025 05:36)  HR: 86 (25 Jan 2025 13:28) (86 - 103)  BP: 138/84 (25 Jan 2025 13:28) (128/79 - 138/84)  RR: 18 (25 Jan 2025 13:28) (18 - 18)  SpO2: 94% (25 Jan 2025 13:28) (90% - 95%): room air    P/E:  Psych: AAO x2; stable mood slightly delirious today  Neuro: No gross new focal deficits; Power and sensation intact  CVS: S1S2 present, regular, no edema  Resp: BLAE+, No wheeze or Rhonchi  GI: Soft, BS+, Non tender, non distended  Extr: No  calf tenderness B/L Lower extremities  Skin: Warm and moist without any rashes    Labs and Imaging reviewed: no new stable 1/22/25; repeat AM                              14.2   7.68  )-----------( 272      ( 25 Jan 2025 05:55 )             43.3   01-25  135  |  102  |  14  ----------------------------<  100[H]  4.5   |  25  |  0.57  Ca    8.6      25 Jan 2025 05:55  Phos  3.0     01-25  Mg     1.9     01-25  TPro  7.3  /  Alb  2.3[L]  /  TBili  1.3[H]  /  DBili  x   /  AST  60[H]  /  ALT  30  /  AlkPhos  81  01-25      MR Head No Cont (01.20.25 @ 14:17)   This exam is compared with prior head CT performed on January 16, 2025  Parenchymal volume loss and chronic microvessel ischemic changes are identified.    There is abnormalT2 prolongation with restricted diffusion seen involving the right basal ganglia/anterior coronal radiata region. This is compatible with an acute infarct. No hemorrhagic transformation is seen. No significant shift or herniation is seen. Abnormal T2 prolongation is seen involving the medial left occipital/inferior parietal cortex. These findings do demonstrate mild increased signal on the diffusion weighted sequence as well as minimal decreased signal on the ADC mapping. These findings could be compatible with subacute infarcts. No hemorrhagic transformation is seen. No significant shift or herniation is seen.    Left frontal extra-axial lesion is seen (7-16) this finding measures approximately 0.6 cm and could be compatible with a small meningioma.   Contrast enhanced MRI of the brain can be done for further evaluation.  The large vessels demonstrate normal flow voids. The paranasal sinuses appear clear  Inflammatory change involving both mastoid regions left greater than right.  Patient is status post bilateral cataract surgery.    IMPRESSION: Infarcts as described above.         Patient seen and examined this afternoon    89 yo F with PMH of Paroxysmal A-fib, SVT, HLD brought from home confused and altered after she was found down on floor > 24 hours after she was last seen in her normal functioning state. Work up in the ED shows  hypoxic-ischemic encephalopathy likely related to possible subacute infarct in the right basal ganglia admitted for further management of possible acute stroke. MRI showing a new acute stroke in addition to subacute infarct in the setting of PAF. Patient initially refused anticoagulation but has now agreed    Patient is doing okay; comfortable in bed; reports being in a restaurant. noted to be eating dinner well.  Patient was seen by PT and recommended Acute Rehab for PT/OT; daughter in agreement; accepted to [facility]; await bed; updated PT requested and completed.    MEDICATIONS  (STANDING):  apixaban 2.5 milliGRAM(s) Oral every 12 hours  atenolol  Tablet 50 milliGRAM(s) Oral daily  atorvastatin 40 milliGRAM(s) Oral daily  bisacodyl 5 milliGRAM(s) Oral at bedtime  escitalopram 20 milliGRAM(s) Oral daily  hydrocortisone 1% Cream 1 Application(s) Topical two times a day  polyethylene glycol 3350 17 Gram(s) Oral daily  senna 2 Tablet(s) Oral at bedtime  sodium chloride 0.9%. 1000 milliLiter(s) (60 mL/Hr) IV Continuous <Continuous>    MEDICATIONS  (PRN):  acetaminophen     Tablet .. 650 milliGRAM(s) Oral every 6 hours PRN Temp greater or equal to 38C (100.4F), Mild Pain (1 - 3)  albuterol    90 MICROgram(s) HFA Inhaler 2 Puff(s) Inhalation every 6 hours PRN for bronchospasm  aluminum hydroxide/magnesium hydroxide/simethicone Suspension 30 milliLiter(s) Oral every 4 hours PRN Dyspepsia  lactulose Syrup 10 Gram(s) Oral daily PRN constipation  melatonin 3 milliGRAM(s) Oral at bedtime PRN Insomnia  ondansetron Injectable 4 milliGRAM(s) IV Push every 8 hours PRN Nausea and/or Vomiting      Vital Signs Last 24 Hrs  T(C): 36.2 (25 Jan 2025 13:28), Max: 36.5 (25 Jan 2025 05:36)  T(F): 97.2 (25 Jan 2025 13:28), Max: 97.7 (25 Jan 2025 05:36)  HR: 86 (25 Jan 2025 13:28) (86 - 103)  BP: 138/84 (25 Jan 2025 13:28) (128/79 - 138/84)  RR: 18 (25 Jan 2025 13:28) (18 - 18)  SpO2: 94% (25 Jan 2025 13:28) (90% - 95%): room air    P/E:  Psych: AAO x2; stable mood slightly delirious today  Neuro: No gross new focal deficits; Power and sensation intact  CVS: S1S2 present, regular, no edema  Resp: BLAE+, No wheeze or Rhonchi  GI: Soft, BS+, Non tender, non distended  Extr: No  calf tenderness B/L Lower extremities  Skin: Warm and moist without any rashes    Labs and Imaging reviewed: no new stable 1/22/25; repeat AM                              14.2   7.68  )-----------( 272      ( 25 Jan 2025 05:55 )             43.3   01-25  135  |  102  |  14  ----------------------------<  100[H]  4.5   |  25  |  0.57  Ca    8.6      25 Jan 2025 05:55  Phos  3.0     01-25  Mg     1.9     01-25  TPro  7.3  /  Alb  2.3[L]  /  TBili  1.3[H]  /  DBili  x   /  AST  60[H]  /  ALT  30  /  AlkPhos  81  01-25      MR Head No Cont (01.20.25 @ 14:17)   This exam is compared with prior head CT performed on January 16, 2025  Parenchymal volume loss and chronic microvessel ischemic changes are identified.    There is abnormalT2 prolongation with restricted diffusion seen involving the right basal ganglia/anterior coronal radiata region. This is compatible with an acute infarct. No hemorrhagic transformation is seen. No significant shift or herniation is seen. Abnormal T2 prolongation is seen involving the medial left occipital/inferior parietal cortex. These findings do demonstrate mild increased signal on the diffusion weighted sequence as well as minimal decreased signal on the ADC mapping. These findings could be compatible with subacute infarcts. No hemorrhagic transformation is seen. No significant shift or herniation is seen.    Left frontal extra-axial lesion is seen (7-16) this finding measures approximately 0.6 cm and could be compatible with a small meningioma.   Contrast enhanced MRI of the brain can be done for further evaluation.  The large vessels demonstrate normal flow voids. The paranasal sinuses appear clear  Inflammatory change involving both mastoid regions left greater than right.  Patient is status post bilateral cataract surgery.    IMPRESSION: Infarcts as described above.         [facility]    89 [facility] F with PMH of Paroxysmal A-fib, SVT, HLD brought from home confused and altered after she was found down on floor > 24 hours after she was last seen in her normal functioning state. Work up in the ED shows  hypoxic-ischemic encephalopathy likely related to possible subacute infarct in the right basal ganglia admitted for further management of possible acute stroke. MRI showing a new acute stroke in addition to subacute infarct in the setting of PAF. Patient initially refused anticoagulation but has now agreed    Patient is doing better today; comfortable in bed; was sleepy in morning and reportd being in hospital; was more alert in the afternoon and is eving was placed OOB to chair; eating okay but appears dehydrated.  await Acute Rehab for PT/OT; accepted to [facility]; await bed; updated PT requested and completed.    MEDICATIONS  (STANDING):  apixaban 2.5 milliGRAM(s) Oral every 12 hours  atenolol  Tablet 50 milliGRAM(s) Oral daily  atorvastatin 40 milliGRAM(s) Oral daily  bisacodyl 5 milliGRAM(s) Oral at bedtime  escitalopram 20 milliGRAM(s) Oral daily  hydrocortisone 1% Cream 1 Application(s) Topical two times a day  polyethylene glycol 3350 17 Gram(s) Oral daily  senna 2 Tablet(s) Oral at bedtime  sodium chloride 0.9%. 1000 milliLiter(s) (60 mL/Hr) IV Continuous <Continuous>    MEDICATIONS  (PRN):  acetaminophen     Tablet .. 650 milliGRAM(s) Oral every 6 hours PRN Temp greater or equal to 38C (100.4F), Mild Pain (1 - 3)  albuterol    90 MICROgram(s) HFA Inhaler 2 Puff(s) Inhalation every 6 hours PRN for bronchospasm  aluminum hydroxide/magnesium hydroxide/simethicone Suspension 30 milliLiter(s) Oral every 4 hours PRN Dyspepsia  lactulose Syrup 10 Gram(s) Oral daily PRN constipation  melatonin 3 milliGRAM(s) Oral at bedtime PRN Insomnia  ondansetron Injectable 4 milliGRAM(s) IV Push every 8 hours PRN Nausea and/or Vomiting      Vital Signs Last 24 Hrs  T(C): 36.2 (25 Jan 2025 13:28), Max: 36.5 (25 Jan 2025 05:36)  T(F): 97.2 (25 Jan 2025 13:28), Max: 97.7 (25 Jan 2025 05:36)  HR: 86 (25 Jan 2025 13:28) (86 - 103)  BP: 138/84 (25 Jan 2025 13:28) (128/79 - 138/84)  RR: 18 (25 Jan 2025 13:28) (18 - 18)  SpO2: 94% (25 Jan 2025 13:28) (90% - 95%): room air    P/E:  eldery female, comfortable, oral mucosa dry  Psych: AAO x3; no re delirious today  Neuro: No gross new focal deficits; Power and sensation intact  CVS: S1S2 present, regular, no edema  Resp: BLAE+, No wheeze or Rhonchi  GI: Soft, BS+, Non tender, non distended  Extr: No  calf tenderness B/L Lower extremities  Skin: Warm and moist without any rashes    Labs: reviewed as below                        14.2   7.68  )-----------( 272      ( 25 Jan 2025 05:55 )             43.3   01-25  135  |  102  |  14  ----------------------------<  100[H]  4.5   |  25  |  0.57  Ca    8.6      25 Jan 2025 05:55  Phos  3.0     01-25  Mg     1.9     01-25  TPro  7.3  /  Alb  2.3[L]  /  TBili  1.3[H]  /  DBili  x   /  AST  60[H]  /  ALT  30  /  AlkPhos  81  01-25      MR Head No Cont (01.20.25 @ 14:17)   This exam is compared with prior head CT performed on January 16, 2025  Parenchymal volume loss and chronic microvessel ischemic changes are identified.    There is abnormalT2 prolongation with restricted diffusion seen involving the right basal ganglia/anterior coronal radiata region. This is compatible with an acute infarct. No hemorrhagic transformation is seen. No significant shift or herniation is seen. Abnormal T2 prolongation is seen involving the medial left occipital/inferior parietal cortex. These findings do demonstrate mild increased signal on the diffusion weighted sequence as well as minimal decreased signal on the ADC mapping. These findings could be compatible with subacute infarcts. No hemorrhagic transformation is seen. No significant shift or herniation is seen.    Left frontal extra-axial lesion is seen (7-16) this finding measures approximately 0.6 cm and could be compatible with a small meningioma.   Contrast enhanced MRI of the brain can be done for further evaluation.  The large vessels demonstrate normal flow voids. The paranasal sinuses appear clear  Inflammatory change involving both mastoid regions left greater than right.  Patient is status post bilateral cataract surgery.    IMPRESSION: Infarcts as described above.

## 2025-01-25 NOTE — CHART NOTE - NSCHARTNOTEFT_GEN_A_CORE
EVENT: EXAM:  CT CHEST PROCEDURE DATE:  01/24/2025  1.  The bilateral ground-glass opacities in the opacities lesion, and centrilobular nodules are of uncertain etiology.  2.  The right lower lobe ill-defined 1.6 x 0.8 cm opacity.  3.  A CT chest without contrast is recommended in 8- weeks to evaluate for resolution/change.    BRIEF HPI: 89F with a PMH of paroxysmal Afib, SVT, HDL, rheumatoid arthritis; Mycobacterium avium complex admitted for acute encephalopathy and subacute CVA. Desaturating to 90 % on room air.    OBJECTIVE:  Vital Signs Last 24 Hrs  T(C): 36.3 (24 Jan 2025 20:24), Max: 36.7 (24 Jan 2025 05:01)  T(F): 97.3 (24 Jan 2025 20:24), Max: 98 (24 Jan 2025 05:01)  HR: 94 (24 Jan 2025 20:24) (59 - 94)  BP: 128/79 (24 Jan 2025 20:24) (105/56 - 131/94)  BP(mean): 107 (24 Jan 2025 13:30) (87 - 107)  RR: 18 (24 Jan 2025 20:24) (17 - 18)  SpO2: 90% (24 Jan 2025 20:24) (90% - 95%)    Parameters below as of 24 Jan 2025 20:24  Patient On (Oxygen Delivery Method): room air    FOCUSED PHYSICAL EXAM:  GEN: Elderly female in bed  RESP: Unlabored on room air, diminished  CV: S1 S2 regular    IMAGING  CT CHEST PROCEDURE DATE:  01/24/2025  Tubes/Lines: None.  Lungs, airways and pleura: Bilateral pleural effusions and lower lobe passive atelectasis.  Secretions in the trachea, right main stem bronchus, bronchus intermedius and right middle lobar bronchus. In addition, there are secretions within the lower lobe segmental airways.  Bilateral bronchiectasis. Bilateral ground glass opacities and patchy consolidation within the upper lobes, lingula, and lower lobes. In addition, there are bilateral centrilobular nodules.  Within the right lower lobe is a 1.6 x 0.8 cm ill-defined opacity (series 4 image 58).  No pneumothorax.  Mediastinum: The visualized thyroid gland is unremarkable except for   bilobar thyroid calcifications. The esophagus is unremarkable.  The upper paratracheal, lower paratracheal, prevascular, and subcarinal   lymph nodes measure less than 15 mm in the short axis.  Cardiomegaly. Coronary artery calcifications. Aortic valve   calcifications. No pericardial effusion. Aorta is tortuous. Aortic calcifications.  Upper Abdomen: Partially imaged 1.0 x 0.6 cm hypodense lesion along the falciform ligament. Spondylosis of the thoracic spine. There is ankylosis of the T8 and T9 vertebral bodies anteriorly.  Bones And Soft Tissues: The bones are unremarkable.  The soft tissues are unremarkable.    PROBLEM: Probable hypoxemia due to bilateral pleural effusions and lower lobe passive atelectasis.  PLAN  Incentive spirometry as tolerated  Chest PT q4h  Pulmonary toilet    IFOLLOW UP / RESULT: Pulse oximetry EVENT: EXAM:  CT CHEST PROCEDURE DATE:  01/24/2025  1.  The bilateral ground-glass opacities in the opacities lesion, and centrilobular nodules are of uncertain etiology.  2.  The right lower lobe ill-defined 1.6 x 0.8 cm opacity.  3.  A CT chest without contrast is recommended in 8- weeks to evaluate for resolution/change.    BRIEF HPI: 89F with a PMH of paroxysmal Afib, SVT, HDL, rheumatoid arthritis; Mycobacterium avium complex admitted for acute encephalopathy and subacute CVA. Saturating 90 % on room air.    OBJECTIVE:  Vital Signs Last 24 Hrs  T(C): 36.3 (24 Jan 2025 20:24), Max: 36.7 (24 Jan 2025 05:01)  T(F): 97.3 (24 Jan 2025 20:24), Max: 98 (24 Jan 2025 05:01)  HR: 94 (24 Jan 2025 20:24) (59 - 94)  BP: 128/79 (24 Jan 2025 20:24) (105/56 - 131/94)  BP(mean): 107 (24 Jan 2025 13:30) (87 - 107)  RR: 18 (24 Jan 2025 20:24) (17 - 18)  SpO2: 90% (24 Jan 2025 20:24) (90% - 95%)    Parameters below as of 24 Jan 2025 20:24  Patient On (Oxygen Delivery Method): room air    FOCUSED PHYSICAL EXAM:  GEN: Elderly female in bed  RESP: Unlabored on room air, diminished at bases, rhonchus  CV: S1 S2 regular    IMAGING  CT CHEST PROCEDURE DATE:  01/24/2025  Tubes/Lines: None.  Lungs, airways and pleura: Bilateral pleural effusions and lower lobe passive atelectasis.  Secretions in the trachea, right main stem bronchus, bronchus intermedius and right middle lobar bronchus. In addition, there are secretions within the lower lobe segmental airways.  Bilateral bronchiectasis. Bilateral ground glass opacities and patchy consolidation within the upper lobes, lingula, and lower lobes. In addition, there are bilateral centrilobular nodules.  Within the right lower lobe is a 1.6 x 0.8 cm ill-defined opacity (series 4 image 58).  No pneumothorax.  Mediastinum: The visualized thyroid gland is unremarkable except for   bilobar thyroid calcifications. The esophagus is unremarkable.  The upper paratracheal, lower paratracheal, prevascular, and subcarinal   lymph nodes measure less than 15 mm in the short axis.  Cardiomegaly. Coronary artery calcifications. Aortic valve   calcifications. No pericardial effusion. Aorta is tortuous. Aortic calcifications.  Upper Abdomen: Partially imaged 1.0 x 0.6 cm hypodense lesion along the falciform ligament. Spondylosis of the thoracic spine. There is ankylosis of the T8 and T9 vertebral bodies anteriorly.  Bones And Soft Tissues: The bones are unremarkable.  The soft tissues are unremarkable.    PROBLEM: Probable hypoxemia due to bilateral pleural effusions and lower lobe passive atelectasis.  PLAN  Incentive spirometry as tolerated  Chest PT q4h  Pulmonary toilet  Acetylcysteine 10%  Inhalation 4 milliliter(s) Inhalation once  Albuterol/ipratropium for Nebulization. 3 milliliter(s) Nebulizer once  Cont albuterol  90 MICRO gram(s) HFA Inhaler 2 Puff(s) Inhalation every 6 hours PRN for bronchospasm    FOLLOW UP / RESULT: Pulse oximetry

## 2025-01-26 PROCEDURE — 99232 SBSQ HOSP IP/OBS MODERATE 35: CPT

## 2025-01-26 RX ORDER — HYDROCORTISONE 1 %
1 CREAM (GRAM) TOPICAL
Refills: 0 | Status: DISCONTINUED | OUTPATIENT
Start: 2025-01-26 | End: 2025-01-27

## 2025-01-26 RX ADMIN — Medication 50 MILLIGRAM(S): at 05:14

## 2025-01-26 RX ADMIN — Medication 5 MILLIGRAM(S): at 21:10

## 2025-01-26 RX ADMIN — Medication 60 MILLILITER(S): at 06:16

## 2025-01-26 RX ADMIN — Medication 2 TABLET(S): at 21:10

## 2025-01-26 RX ADMIN — APIXABAN 2.5 MILLIGRAM(S): 5 TABLET, FILM COATED ORAL at 09:28

## 2025-01-26 RX ADMIN — APIXABAN 2.5 MILLIGRAM(S): 5 TABLET, FILM COATED ORAL at 21:10

## 2025-01-26 RX ADMIN — Medication 1 APPLICATION(S): at 17:24

## 2025-01-26 RX ADMIN — ATORVASTATIN CALCIUM 40 MILLIGRAM(S): 80 TABLET, FILM COATED ORAL at 21:10

## 2025-01-26 RX ADMIN — Medication 1 APPLICATION(S): at 05:14

## 2025-01-26 RX ADMIN — ESCITALOPRAM 20 MILLIGRAM(S): 10 TABLET, FILM COATED ORAL at 13:22

## 2025-01-26 NOTE — PROGRESS NOTE ADULT - TIME BILLING
- Review of records, telemetry, vital signs and daily labs.   - General and cardiovascular physical examination.  - Generation of cardiovascular treatment plan and completion of note .  - Coordination of care.      Patient was seen and examined by me on  01/26/2025,interim events noted,labs and radiology studies reviewed.  Adolfo Bah MD,FACC.  [address] [facility].  [phone]  Availabe to call or text on Microsoft TEAMS.
- Review of records, telemetry, vital signs and daily labs.   - General and cardiovascular physical examination.  - Generation of cardiovascular treatment plan and completion of note .  - Coordination of care.      Patient was seen and examined by me on  01/22/2025,interim events noted,labs and radiology studies reviewed.  Adolfo Bah MD,FACC.  [address] [facility].  [phone]  Availabe to call or text on Microsoft TEAMS.
- Review of records, telemetry, vital signs and daily labs.   - General and cardiovascular physical examination.  - Generation of cardiovascular treatment plan and completion of note .  - Coordination of care.      Patient was seen and examined by me on  01/24/2025,interim events noted,labs and radiology studies reviewed.  Adolfo Bah MD,FACC.  [address] [place], [place] [id number].  [phone]  Availabe to call or text on Microsoft TEAMS.
- Review of records, telemetry, vital signs and daily labs.   - General and cardiovascular physical examination.  - Generation of cardiovascular treatment plan and completion of note .  - Coordination of care.      Patient was seen and examined by me on  01/21/2025,interim events noted,labs and radiology studies reviewed.  Adolfo Bah MD,FACC.  [address] [facility].  [phone]  Availabe to call or text on Microsoft TEAMS.
- Review of records, telemetry, vital signs and daily labs.   - General and cardiovascular physical examination.  - Generation of cardiovascular treatment plan and completion of note .  - Coordination of care.      Patient was seen and examined by me on  01/19/2025,interim events noted,labs and radiology studies reviewed.  Adolfo Bah MD,FACC.  [address] [facility].  [phone]  Availabe to call or text on Microsoft TEAMS.
- Review of records, telemetry, vital signs and daily labs.   - General and cardiovascular physical examination.  - Generation of cardiovascular treatment plan and completion of note .  - Coordination of care.      Patient was seen and examined by me on  01/18/2025,interim events noted,labs and radiology studies reviewed.  Adolfo Bah MD,FACC.  [address] [place], [place] [id number].  [phone]  Availabe to call or text on Microsoft TEAMS.
- Review of records, telemetry, vital signs and daily labs.   - General and cardiovascular physical examination.  - Generation of cardiovascular treatment plan and completion of note .  - Coordination of care.      Patient was seen and examined by me on  01/23/2025,interim events noted,labs and radiology studies reviewed.  Adolfo Bah MD,FACC.  [address] [facility].  [phone]  Availabe to call or text on Microsoft TEAMS.
Time spent includes direct patient care  (interview and examination of patient), discussion with other providers, support staff and/or patient's family members, review of medical records, ordering diagnostic tests and analyzing results, and documentation excluding time spent doing teaching and procedures.
- Review of records, telemetry, vital signs and daily labs.   - General and cardiovascular physical examination.  - Generation of cardiovascular treatment plan and completion of note .  - Coordination of care.      Patient was seen and examined by me on  01/25/2025,interim events noted,labs and radiology studies reviewed.  Adolfo Bah MD,FACC.  [address] [facility].  [phone]  Availabe to call or text on Microsoft TEAMS.
- Review of records, telemetry, vital signs and daily labs.   - General and cardiovascular physical examination.  - Generation of cardiovascular treatment plan and completion of note .  - Coordination of care.      Patient was seen and examined by me on  01/20/2025,interim events noted,labs and radiology studies reviewed.  Adolfo Bah MD,FACC.  [address] [place], [place] [id number].  [phone]  Availabe to call or text on Microsoft TEAMS.
Time spent includes direct patient care  (interview and examination of patient), discussion with other providers, support staff and/or patient's family members, review of medical records, ordering diagnostic tests and analyzing results, and documentation excluding time spent doing teaching and procedures.
Time spent includes direct patient care  (interview and examination of patient), discussion with other providers, support staff and/or patient's family members, review of medical records, ordering diagnostic tests and analyzing results, and documentation excluding time spent doing teaching and procedures.

## 2025-01-26 NOTE — PROGRESS NOTE ADULT - ASSESSMENT
A/P:  #Acute CVA Right basal ganglia and Corona Radiata   #Subacute infarct left occipital/ inferior parietal cortex  #Acute delirium possibly dehydration resolved  #A. fib intermittent RVR, stable HR  #Hx Depression with Anxiety    Plan:  PT re-eval appreciated; recommend Acute Rehab which will also provide OT  Patient accepted; awaiting bed   No dysphagia; regular diet tolerated  OOB to chair d/w N rosetta this evening  Continue Eliquis and Statin; stroke in multiple areas concerning for embolic stroke in the setting of underlying A. Fib  TTE = EF 55-60% otherwise normal  Continue bowel regimen: Miralax, senna, add bisacodyl and lactulose prn and use for goal of 1 to 2 BM per day   Labs stable; no need to repeat daily;   Dscuss with ACP covering  Discussed with Patient; d/w daughter Haydee over the phone this evening and updated clinical improvement   Discussed with  Marcela; calls made this morning and afternoon; no bed available still  Discussed with RN; medically stable for Rehab. A/P:  #Acute CVA Right basal ganglia and Corona Radiata   #Subacute infarct left occipital/ inferior parietal cortex  #Acute delirium possibly dehydration resolved  #A. fib intermittent RVR, stable HR  #Hx Depression with Anxiety    Plan:  PT follow up; requested f/u on weekend; OOB to chair PT recommend Acute Rehab which will also provide OT  Patient accepted; awaiting bed; nobed available this weekend  No dysphagia; regular diet tolerated  Continue Eliquis and Statin; stroke in multiple areas concerning for embolic stroke in the setting of underlying A. Fib  TTE = EF 55-60% otherwise normal  Continue bowel regimen: Miralax, senna, add bisacodyl and lactulose prn and use for goal of 1 to 2 BM per day   Labs stable; no need to repeat daily;   Discussed with ACP covering [place]  Discussed with Patient; d/w daughter Haydee over the phoneyesterday and updated clinical improvement   Discussed with  covering Robert;  Discussed with RN; medically stable for Rehab. A/P:  #Acute CVA Right basal ganglia and Corona Radiata   #Subacute infarct left occipital/ inferior parietal cortex  #Acute delirium possibly dehydration resolved  #A. fib intermittent RVR, stable HR  #Abnormal CT imaging  #Hx Depression with Anxiety    Plan:  PT follow up; requested f/u on weekend; OOB to chair PT recommend Acute Rehab which will also provide OT  Patient accepted; awaiting bed; nobed available this weekend  No dysphagia; regular diet tolerated  Continue Eliquis and Statin; stroke in multiple areas concerning for embolic stroke in the setting of underlying A. Fib  TTE = EF 55-60% otherwise normal  CT Chest with non specific findings of lung nodile and opacity; may repeat in 8 weeks if clinically indicated; avoid aggressive  intervention given advanced age;   Continue bowel regimen: Miralax, senna, add bisacodyl and lactulose prn and use for goal of 1 to 2 BM per day   Labs stable; no need to repeat daily;   Discussed with ACP covering [place]  Discussed with Patient; d/w daughter Haydee over the phoneyesterday and updated clinical improvement   Discussed with  johnathon Jones;  Discussed with ACP covering and RN; medically stable for Rehab. A/P:  #Acute CVA Right basal ganglia and Corona Radiata   #Subacute infarct left occipital/ inferior parietal cortex  #Acute delirium possibly dehydration resolved  #A. fib intermittent RVR, stable HR  #Abnormal CT imaging  #Hx Depression with Anxiety    Plan:  PT follow up; requested f/u on weekend; OOB to chair PT recommend Acute Rehab which will also provide OT  Patient accepted; awaiting bed; no bed available this weekend  No dysphagia; regular diet tolerated  Continue Eliquis and Statin; stroke in multiple areas concerning for embolic stroke in the setting of underlying A. Fib  TTE = EF 55-60% otherwise normal  CT Chest with non specific findings of lung nodule and opacity; may repeat in 8 weeks if clinically indicated; avoid aggressive  intervention given advanced age;   Continue bowel regimen: Miralax, senna, add bisacodyl and lactulose prn and use for goal of 1 to 2 BM per day   Labs stable; no need to repeat daily;   Discussed with ACP covering [place]  Discussed with Patient; d/w Jonathan Hubbard over the phone yesterday and updated clinical improvement   Discussed with  johnathon Jones;  Discussed with ACP covering and RN; medically stable for Rehab.

## 2025-01-26 NOTE — PROGRESS NOTE ADULT - SUBJECTIVE AND OBJECTIVE BOX
MR#[id number]  PATIENT NAME:ONOFRE REIS    DATE OF SERVICE: 01-26-25 @ 0900  Patient was seen and examined by Adolfo Bha MD on    01-26-25 @ 0900  Interim events noted.Consultant notes ,Labs,Telemetry reviewed by me       HOSPITAL COURSE: HPI:  89F from home walks with cane PMH of paroxysmal Afib, SVT, HLD, rheumatoid arthritis; Mycobacterium avium complex presented with acute mental status changes. Patient is AOx3 at baseline, living alone. Collateral obtained by niece. Patient was last seen 2 days ago and may have had a doctors appointment with Dr Rhodes, her PCP. Patient was found by her neighbor on the floor in her apartment.     (17 Jan 2025 06:49)      INTERIM EVENTS:Patient seen at bedside ,interim events noted.      PMH -reviewed admission note, no change since admission  HEART FAILURE: Acute[ ]Chronic[ ] Systolic[ ] Diastolic[ ] Combined Systolic and Diastolic[ ]  CAD[ ] CABG[ ] PCI[ ]  DEVICES[ ] PPM[ ] ICD[ ] ILR[ ]  ATRIAL FIBRILLATION[ ] Paroxysmal[ ] Permanent[ ] CHADS2-[  ]  FARAZ[ ] CKD1[ ] CKD2[ ] CKD3[ ] CKD4[ ] ESRD[ ]  COPD[ ] HTN[ ]   DM[ ] Type1[ ] Type 2[ ]   CVA[ ] Paresis[ ]    AMBULATION: Assisted[ ] Cane/walker[ ] Independent[ ]    MEDICATIONS  (STANDING):  apixaban 2.5 milliGRAM(s) Oral every 12 hours  atenolol  Tablet 50 milliGRAM(s) Oral daily  atorvastatin 40 milliGRAM(s) Oral daily  bisacodyl 5 milliGRAM(s) Oral at bedtime  escitalopram 20 milliGRAM(s) Oral daily  hydrocortisone 1% Cream 1 Application(s) Topical two times a day  polyethylene glycol 3350 17 Gram(s) Oral daily  senna 2 Tablet(s) Oral at bedtime  sodium chloride 0.9%. 1000 milliLiter(s) (60 mL/Hr) IV Continuous <Continuous>    MEDICATIONS  (PRN):  acetaminophen     Tablet .. 650 milliGRAM(s) Oral every 6 hours PRN Temp greater or equal to 38C (100.4F), Mild Pain (1 - 3)  albuterol    90 MICROgram(s) HFA Inhaler 2 Puff(s) Inhalation every 6 hours PRN for bronchospasm  aluminum hydroxide/magnesium hydroxide/simethicone Suspension 30 milliLiter(s) Oral every 4 hours PRN Dyspepsia  lactulose Syrup 10 Gram(s) Oral daily PRN constipation  melatonin 3 milliGRAM(s) Oral at bedtime PRN Insomnia  ondansetron Injectable 4 milliGRAM(s) IV Push every 8 hours PRN Nausea and/or Vomiting            REVIEW OF SYSTEMS:  Constitutional: [ ] fever, [ ]weight loss,  [ ]fatigue [ ]weight gain  Eyes: [ ] visual changes  Respiratory: [ ]shortness of breath;  [ ] cough, [ ]wheezing, [ ]chills, [ ]hemoptysis  Cardiovascular: [ ] chest pain, [ ]palpitations, [ ]dizziness,  [ ]leg swelling[ ]orthopnea[ ]PND  Gastrointestinal: [ ] abdominal pain, [ ]nausea, [ ]vomiting,  [ ]diarrhea [ ]Constipation [ ]Melena  Genitourinary: [ ] dysuria, [ ] hematuria [ ][place]  Neurologic: [ ] headaches [ ] tremors[ ]weakness [ ]Paralysis Right[ ] Left[ ]  Skin: [ ] itching, [ ]burning, [ ] rashes  Endocrine: [ ] heat or cold intolerance  Musculoskeletal: [ ] joint pain or swelling; [ ] muscle, back, or extremity pain  Psychiatric: [ ] depression, [ ]anxiety, [ ]mood swings, or [ ]difficulty sleeping  Hematologic: [ ] easy bruising, [ ] bleeding gums    [ ] All remaining systems negative except as per above.   [ ]Unable to obtain.  [x] No change in ROS since admission      Vital Signs Last 24 Hrs  T(C): 36.8 (26 Jan 2025 21:14), Max: 36.8 (26 Jan 2025 21:14)  T(F): 98.3 (26 Jan 2025 21:14), Max: 98.3 (26 Jan 2025 21:14)  HR: 87 (26 Jan 2025 21:14) (87 - 94)  BP: 117/75 (26 Jan 2025 21:14) (117/75 - 130/82)  BP(mean): --  RR: 18 (26 Jan 2025 21:14) (18 - 18)  SpO2: 91% (26 Jan 2025 21:14) (90% - 96%)    Parameters below as of 26 Jan 2025 21:14  Patient On (Oxygen Delivery Method): room air      I&O's Summary    25 Jan 2025 07:01  -  26 Jan 2025 07:00  --------------------------------------------------------  IN: 0 mL / OUT: 200 mL / NET: -200 mL    26 Jan 2025 07:01  -  26 Jan 2025 22:48  --------------------------------------------------------  IN: 0 mL / OUT: 400 mL / NET: -400 mL        PHYSICAL EXAM:  General: No acute distress BMI-  HEENT: EOMI, PERRL  Neck: Supple, [ ] JVD  Lungs: Equal air entry bilaterally; [ ] rales [ ] wheezing [ ] rhonchi  Heart: Regular rate and rhythm; [x ] murmur   2/6 [ x] systolic [ ] diastolic [ ] radiation[ ] rubs [ ]  gallops  Abdomen: Nontender, bowel sounds present  Extremities: No clubbing, cyanosis, [ ] edema [ ]Pulses  equal and intact  Nervous system:  Alert & Oriented X3, no focal deficits  Psychiatric: Normal affect  Skin: No rashes or lesions    LABS:  01-25    135  |  102  |  14  ----------------------------<  100[H]  4.5   |  25  |  0.57    Ca    8.6      25 Jan 2025 05:55  Phos  3.0     01-25  Mg     1.9     01-25    TPro  7.3  /  Alb  2.3[L]  /  TBili  1.3[H]  /  DBili  x   /  AST  60[H]  /  ALT  30  /  AlkPhos  81  01-25    Creatinine Trend: 0.57<--, 0.52<--, 0.55<--, 0.42<--, 0.44<--, 0.55<--                        14.2   7.68  )-----------( 272      ( 25 Jan 2025 05:55 )             43.3       < from: TTE W or WO Ultrasound Enhancing Agent (01.17.25 @ 13:23) >     CONCLUSIONS:      1. Left ventricular systolic function is normal with an ejection fraction visually estimated at 55 to 60 %.   2. There is normal LV mass and normal geometry.   3. The right ventricle is not well visualized. probably enlarged right ventricular cavity size and normal right ventricular systolic function.   4. Agitated saline injection was negative for intracardiac shunt.   5. Estimated pulmonary artery systolic pressure is 62 mmHg, consistent with severe pulmonary hypertension.   6. Moderate to severe tricuspid regurgitation.    < end of copied text >

## 2025-01-27 ENCOUNTER — TRANSCRIPTION ENCOUNTER (OUTPATIENT)
Age: 89
End: 2025-01-27

## 2025-01-27 ENCOUNTER — INPATIENT (INPATIENT)
Facility: HOSPITAL | Age: 89
LOS: 9 days | Discharge: ANOTHER HOSPITAL | DRG: 66 | End: 2025-02-06
Attending: PHYSICAL MEDICINE & REHABILITATION | Admitting: PHYSICAL MEDICINE & REHABILITATION
Payer: MEDICARE

## 2025-01-27 VITALS
DIASTOLIC BLOOD PRESSURE: 75 MMHG | SYSTOLIC BLOOD PRESSURE: 113 MMHG | HEART RATE: 80 BPM | OXYGEN SATURATION: 93 % | RESPIRATION RATE: 18 BRPM | TEMPERATURE: 97 F

## 2025-01-27 VITALS
SYSTOLIC BLOOD PRESSURE: 122 MMHG | OXYGEN SATURATION: 96 % | HEART RATE: 81 BPM | DIASTOLIC BLOOD PRESSURE: 80 MMHG | RESPIRATION RATE: 16 BRPM | TEMPERATURE: 98 F | WEIGHT: 126.1 LBS | HEIGHT: 64 IN

## 2025-01-27 DIAGNOSIS — I63.9 CEREBRAL INFARCTION, UNSPECIFIED: ICD-10-CM

## 2025-01-27 PROBLEM — A31.0 PULMONARY MYCOBACTERIAL INFECTION: Chronic | Status: ACTIVE | Noted: 2025-01-17

## 2025-01-27 PROBLEM — I48.0 PAROXYSMAL ATRIAL FIBRILLATION: Chronic | Status: ACTIVE | Noted: 2025-01-17

## 2025-01-27 PROBLEM — I10 ESSENTIAL (PRIMARY) HYPERTENSION: Chronic | Status: ACTIVE | Noted: 2025-01-17

## 2025-01-27 LAB
ANION GAP SERPL CALC-SCNC: 10 MMOL/L — SIGNIFICANT CHANGE UP (ref 5–17)
BUN SERPL-MCNC: 11 MG/DL — SIGNIFICANT CHANGE UP (ref 7–18)
CALCIUM SERPL-MCNC: 8.7 MG/DL — SIGNIFICANT CHANGE UP (ref 8.4–10.5)
CHLORIDE SERPL-SCNC: 103 MMOL/L — SIGNIFICANT CHANGE UP (ref 96–108)
CO2 SERPL-SCNC: 24 MMOL/L — SIGNIFICANT CHANGE UP (ref 22–31)
CREAT SERPL-MCNC: 0.52 MG/DL — SIGNIFICANT CHANGE UP (ref 0.5–1.3)
EGFR: 87 ML/MIN/1.73M2 — SIGNIFICANT CHANGE UP
GLUCOSE SERPL-MCNC: 113 MG/DL — HIGH (ref 70–99)
HCT VFR BLD CALC: 39.5 % — SIGNIFICANT CHANGE UP (ref 34.5–45)
HGB BLD-MCNC: 12.9 G/DL — SIGNIFICANT CHANGE UP (ref 11.5–15.5)
MCHC RBC-ENTMCNC: 32 PG — SIGNIFICANT CHANGE UP (ref 27–34)
MCHC RBC-ENTMCNC: 32.7 G/DL — SIGNIFICANT CHANGE UP (ref 32–36)
MCV RBC AUTO: 98 FL — SIGNIFICANT CHANGE UP (ref 80–100)
NRBC # BLD: 0 /100 WBCS — SIGNIFICANT CHANGE UP (ref 0–0)
NRBC BLD-RTO: 0 /100 WBCS — SIGNIFICANT CHANGE UP (ref 0–0)
PLATELET # BLD AUTO: 278 K/UL — SIGNIFICANT CHANGE UP (ref 150–400)
POTASSIUM SERPL-MCNC: 4.3 MMOL/L — SIGNIFICANT CHANGE UP (ref 3.5–5.3)
POTASSIUM SERPL-SCNC: 4.3 MMOL/L — SIGNIFICANT CHANGE UP (ref 3.5–5.3)
RBC # BLD: 4.03 M/UL — SIGNIFICANT CHANGE UP (ref 3.8–5.2)
RBC # FLD: 14 % — SIGNIFICANT CHANGE UP (ref 10.3–14.5)
SARS-COV-2 RNA SPEC QL NAA+PROBE: SIGNIFICANT CHANGE UP
SODIUM SERPL-SCNC: 137 MMOL/L — SIGNIFICANT CHANGE UP (ref 135–145)
WBC # BLD: 9.06 K/UL — SIGNIFICANT CHANGE UP (ref 3.8–10.5)
WBC # FLD AUTO: 9.06 K/UL — SIGNIFICANT CHANGE UP (ref 3.8–10.5)

## 2025-01-27 PROCEDURE — 84100 ASSAY OF PHOSPHORUS: CPT

## 2025-01-27 PROCEDURE — 87040 BLOOD CULTURE FOR BACTERIA: CPT

## 2025-01-27 PROCEDURE — 97530 THERAPEUTIC ACTIVITIES: CPT

## 2025-01-27 PROCEDURE — 83036 HEMOGLOBIN GLYCOSYLATED A1C: CPT

## 2025-01-27 PROCEDURE — 85027 COMPLETE CBC AUTOMATED: CPT

## 2025-01-27 PROCEDURE — 85610 PROTHROMBIN TIME: CPT

## 2025-01-27 PROCEDURE — 83735 ASSAY OF MAGNESIUM: CPT

## 2025-01-27 PROCEDURE — 81001 URINALYSIS AUTO W/SCOPE: CPT

## 2025-01-27 PROCEDURE — 87637 SARSCOV2&INF A&B&RSV AMP PRB: CPT

## 2025-01-27 PROCEDURE — 97162 PT EVAL MOD COMPLEX 30 MIN: CPT

## 2025-01-27 PROCEDURE — 92610 EVALUATE SWALLOWING FUNCTION: CPT

## 2025-01-27 PROCEDURE — 70496 CT ANGIOGRAPHY HEAD: CPT | Mod: MC

## 2025-01-27 PROCEDURE — 80061 LIPID PANEL: CPT

## 2025-01-27 PROCEDURE — 0241U: CPT

## 2025-01-27 PROCEDURE — 94640 AIRWAY INHALATION TREATMENT: CPT

## 2025-01-27 PROCEDURE — 71045 X-RAY EXAM CHEST 1 VIEW: CPT

## 2025-01-27 PROCEDURE — 70450 CT HEAD/BRAIN W/O DYE: CPT | Mod: MC

## 2025-01-27 PROCEDURE — 97116 GAIT TRAINING THERAPY: CPT

## 2025-01-27 PROCEDURE — 84484 ASSAY OF TROPONIN QUANT: CPT

## 2025-01-27 PROCEDURE — 80053 COMPREHEN METABOLIC PANEL: CPT

## 2025-01-27 PROCEDURE — 70498 CT ANGIOGRAPHY NECK: CPT | Mod: MC

## 2025-01-27 PROCEDURE — 93005 ELECTROCARDIOGRAM TRACING: CPT

## 2025-01-27 PROCEDURE — 82550 ASSAY OF CK (CPK): CPT

## 2025-01-27 PROCEDURE — 71250 CT THORAX DX C-: CPT | Mod: MC

## 2025-01-27 PROCEDURE — 96374 THER/PROPH/DIAG INJ IV PUSH: CPT

## 2025-01-27 PROCEDURE — 36415 COLL VENOUS BLD VENIPUNCTURE: CPT

## 2025-01-27 PROCEDURE — 85025 COMPLETE CBC W/AUTO DIFF WBC: CPT

## 2025-01-27 PROCEDURE — 70551 MRI BRAIN STEM W/O DYE: CPT | Mod: MC

## 2025-01-27 PROCEDURE — 85730 THROMBOPLASTIN TIME PARTIAL: CPT

## 2025-01-27 PROCEDURE — 99285 EMERGENCY DEPT VISIT HI MDM: CPT

## 2025-01-27 PROCEDURE — 99239 HOSP IP/OBS DSCHRG MGMT >30: CPT

## 2025-01-27 PROCEDURE — 72170 X-RAY EXAM OF PELVIS: CPT

## 2025-01-27 PROCEDURE — 83605 ASSAY OF LACTIC ACID: CPT

## 2025-01-27 PROCEDURE — 96375 TX/PRO/DX INJ NEW DRUG ADDON: CPT

## 2025-01-27 PROCEDURE — 82962 GLUCOSE BLOOD TEST: CPT

## 2025-01-27 PROCEDURE — 80048 BASIC METABOLIC PNL TOTAL CA: CPT

## 2025-01-27 PROCEDURE — 93306 TTE W/DOPPLER COMPLETE: CPT

## 2025-01-27 PROCEDURE — 72125 CT NECK SPINE W/O DYE: CPT | Mod: MC

## 2025-01-27 RX ORDER — MOMETASONE FUROATE 220 UG/1
2 INHALANT RESPIRATORY (INHALATION) DAILY
Refills: 0 | Status: DISCONTINUED | OUTPATIENT
Start: 2025-01-27 | End: 2025-01-29

## 2025-01-27 RX ORDER — PANTOPRAZOLE 20 MG/1
40 TABLET, DELAYED RELEASE ORAL
Refills: 0 | Status: DISCONTINUED | OUTPATIENT
Start: 2025-01-27 | End: 2025-02-06

## 2025-01-27 RX ORDER — SENNOSIDES 8.6 MG
2 TABLET ORAL AT BEDTIME
Refills: 0 | Status: DISCONTINUED | OUTPATIENT
Start: 2025-01-27 | End: 2025-02-06

## 2025-01-27 RX ORDER — ATORVASTATIN CALCIUM 80 MG/1
40 TABLET, FILM COATED ORAL AT BEDTIME
Refills: 0 | Status: DISCONTINUED | OUTPATIENT
Start: 2025-01-27 | End: 2025-02-06

## 2025-01-27 RX ORDER — VERAPAMIL HCL 120 MG
1 TABLET, EXTENDED RELEASE ORAL
Refills: 0 | DISCHARGE

## 2025-01-27 RX ORDER — ATENOLOL 50 MG
1 TABLET ORAL
Refills: 0 | DISCHARGE

## 2025-01-27 RX ORDER — ATENOLOL 50 MG
50 TABLET ORAL DAILY
Refills: 0 | Status: DISCONTINUED | OUTPATIENT
Start: 2025-01-27 | End: 2025-01-31

## 2025-01-27 RX ORDER — POLYETHYLENE GLYCOL 3350 17 G/17G
17 POWDER, FOR SOLUTION ORAL DAILY
Refills: 0 | Status: DISCONTINUED | OUTPATIENT
Start: 2025-01-27 | End: 2025-02-06

## 2025-01-27 RX ORDER — ATENOLOL 50 MG
1 TABLET ORAL
Qty: 0 | Refills: 0 | DISCHARGE
Start: 2025-01-27

## 2025-01-27 RX ORDER — ALBUTEROL 90 MCG
2 AEROSOL REFILL (GRAM) INHALATION EVERY 6 HOURS
Refills: 0 | Status: COMPLETED | OUTPATIENT
Start: 2025-01-27 | End: 2025-12-26

## 2025-01-27 RX ORDER — AMMONIUM LACTATE 12 %
1 LOTION (GRAM) TOPICAL
Refills: 0 | Status: DISCONTINUED | OUTPATIENT
Start: 2025-01-27 | End: 2025-02-06

## 2025-01-27 RX ORDER — TOFACITINIB 10 MG/1
1 TABLET, FILM COATED ORAL
Refills: 0 | DISCHARGE

## 2025-01-27 RX ORDER — ACETAMINOPHEN 160 MG/5ML
650 SUSPENSION ORAL EVERY 6 HOURS
Refills: 0 | Status: DISCONTINUED | OUTPATIENT
Start: 2025-01-27 | End: 2025-02-06

## 2025-01-27 RX ORDER — NYSTATIN 100000 U/G
1 POWDER TOPICAL
Refills: 0 | Status: DISCONTINUED | OUTPATIENT
Start: 2025-01-27 | End: 2025-02-06

## 2025-01-27 RX ORDER — ESCITALOPRAM 10 MG/1
20 TABLET, FILM COATED ORAL DAILY
Refills: 0 | Status: DISCONTINUED | OUTPATIENT
Start: 2025-01-27 | End: 2025-02-06

## 2025-01-27 RX ORDER — APIXABAN 5 MG/1
2.5 TABLET, FILM COATED ORAL EVERY 12 HOURS
Refills: 0 | Status: DISCONTINUED | OUTPATIENT
Start: 2025-01-27 | End: 2025-02-06

## 2025-01-27 RX ADMIN — Medication 2 TABLET(S): at 22:16

## 2025-01-27 RX ADMIN — APIXABAN 2.5 MILLIGRAM(S): 5 TABLET, FILM COATED ORAL at 11:35

## 2025-01-27 RX ADMIN — ESCITALOPRAM 20 MILLIGRAM(S): 10 TABLET, FILM COATED ORAL at 11:35

## 2025-01-27 RX ADMIN — Medication 50 MILLIGRAM(S): at 05:22

## 2025-01-27 RX ADMIN — Medication 1 APPLICATION(S): at 18:02

## 2025-01-27 RX ADMIN — Medication 1 APPLICATION(S): at 05:22

## 2025-01-27 RX ADMIN — ATORVASTATIN CALCIUM 40 MILLIGRAM(S): 80 TABLET, FILM COATED ORAL at 22:15

## 2025-01-27 RX ADMIN — POLYETHYLENE GLYCOL 3350 17 GRAM(S): 17 POWDER, FOR SOLUTION ORAL at 11:35

## 2025-01-27 NOTE — H&P ADULT - NSHPREVIEWOFSYSTEMS_GEN_ALL_CORE
REVIEW OF SYSTEMS  Constitutional: No fever, No Chills, + fatigue  HEENT: No eye pain, +R vision retinal problem w/ intermittent blurred vision, double vision, and floaters, No difficulty hearing  Pulm: + cough,  + shortness of breath, +intermittent difficulty breathing  Cardio: No chest pain, + intermittent palpitations  GI:  No abdominal pain, + nausea, + vomiting, No diarrhea, +constipation, +intermittent incontinence   : No dysuria, No frequency, No hematuria, +intermittent incontinence   Neuro: No headaches, No memory loss, +weakness, No numbness, No tremors  Skin: No itching, No rashes, No lesions   Endo: No temperature intolerance  MSK: +BLE calf pain, +BLE edema (LLE>RLE),  No Neck pain,  No back pain  Psych:  No depression, No anxiety

## 2025-01-27 NOTE — H&P ADULT - NSICDXPASTMEDICALHX_GEN_ALL_CORE_FT
PAST MEDICAL HISTORY:  Atrial fibrillation     HTN (hypertension)     JACINTA (mycobacterium avium-intracellulare)     Paroxysmal atrial fibrillation     Rheumatoid arthritis

## 2025-01-27 NOTE — DISCHARGE NOTE NURSING/CASE MANAGEMENT/SOCIAL WORK - FINANCIAL ASSISTANCE
[facility] provides services at a reduced cost to those who are determined to be eligible through [facility]’s financial assistance program. Information regarding [facility]’s financial assistance program can be found by going to [email or web address] or by calling [phone].

## 2025-01-27 NOTE — PATIENT PROFILE ADULT - FALL HARM RISK - HARM RISK INTERVENTIONS

## 2025-01-27 NOTE — PROGRESS NOTE ADULT - NUTRITIONAL ASSESSMENT
This patient has been assessed with a concern for Malnutrition and has been determined to have a diagnosis/diagnoses of Severe protein-calorie malnutrition.    This patient is being managed with:   Diet Regular-  DASH/TLC {Sodium & Cholesterol Restricted}  Entered: Jan 18 2025 10:53AM  

## 2025-01-27 NOTE — DISCHARGE NOTE NURSING/CASE MANAGEMENT/SOCIAL WORK - NSDCPEFALRISK_GEN_ALL_CORE
For information on Fall & Injury Prevention, visit: [email or web address] OR  [email or web address] OR  https://www.cdc.gov/steadi/patient.html

## 2025-01-27 NOTE — H&P ADULT - NSHPPHYSICALEXAM_GEN_ALL_CORE
PHYSICAL EXAM  VITALS  T(C): 36.3 (01-27-25 @ 13:35), Max: 36.8 (01-26-25 @ 21:14)  HR: 80 (01-27-25 @ 13:35) (66 - 87)  BP: 113/75 (01-27-25 @ 13:35) (104/66 - 131/81)  RR: 18 (01-27-25 @ 13:35) (18 - 18)  SpO2: 93% (01-27-25 @ 13:35) (91% - 97%)    Gen - Lethargic but arousable, frail appearing  HEENT - EOMI, MMM, PERRLA, Normal Conjunctivae  Neck - Supple, No limited ROM, audible    Pulm - Wet audible gurgling, crackles on auscultation   Cardiovascular - RRR, S1S2, No murmurs  Abdomen - Soft, NT/ND, +BS  Extremities - No C/C, BLE calf tenderness, BLE mild edema (LLE>RLE), distal b/l hands and feet w/ deformity   Neuro-     Cognitive - awake, alert, oriented to person, place, date, year, and situation.  Able  to follow command     Communication - Fluent, Comprehensible, No dysarthria, No aphasia, +Hypophonic, repetition intact      Attention: Intact, able to state days of week chronologically and backwards. Able to perform simple additions and subtractions     Memory: Recall 3 objects immediate and 2/3 3 min later,      Cranial Nerves -No facial asymmetry, Tongue midline, EOMI, Shoulder shrug intact     Motor -                     LEFT    UE - ShAB 5/5, EF 5/5, EE 3/5,  4/5                    RIGHT UE - ShAB 5/5, EF 5/5, EE 3/5,   4/5                    LEFT    LE - HF 5/5, KE 5/5, DF 5/5, PF 5/5                    RIGHT LE - HF 5/5, KE 5/5, DF 5/5, PF 5/5        Sensory - Intact  to LT     Coordination - FTN/HTS intact   Psychiatric - Mood stable, Affect WNL  Skin:  Generalized ecchymosis/scabbing, facial blanchable redness, b/l elbows blanchable redness, R hand middle finger abrasion healed, moisture associated dermatitis groin, L hip healed abrasion, pressure injury to coccyx w/ slough 1.2x0.5cm, BLE toes blanchable redness PHYSICAL EXAM  VITALS  T(C): 36.3 (01-27-25 @ 13:35), Max: 36.8 (01-26-25 @ 21:14)  HR: 80 (01-27-25 @ 13:35) (66 - 87)  BP: 113/75 (01-27-25 @ 13:35) (104/66 - 131/81)  RR: 18 (01-27-25 @ 13:35) (18 - 18)  SpO2: 93% (01-27-25 @ 13:35) (91% - 97%)    Gen - Lethargic but arousable, frail appearing  HEENT - EOMI, MMM, PERRLA, Normal Conjunctivae  Neck - Supple, No limited ROM, audible    Pulm - Wet audible gurgling, crackles on auscultation   Cardiovascular - RRR, S1S2, No murmurs  Abdomen - Soft, NT/ND, +BS  Extremities - No C/C, BLE calf tenderness, BLE mild edema (LLE>RLE), distal b/l hands and feet w/ deformity   Neuro-     Cognitive - awake, alert, oriented to person, place, date, year, and situation.  Able  to follow command     Communication - Fluent, Comprehensible, No dysarthria, No aphasia, +Hypophonic, repetition intact      Attention: Intact, able to state days of week chronologically and backwards. Able to perform simple additions and subtractions     Memory: Recall 3 objects immediate and 2/3 3 min later,      Cranial Nerves -No facial asymmetry, Tongue midline, EOMI, Shoulder shrug intact     Motor -                     LEFT    UE - ShAB 5/5, EF 5/5, EE 3/5,  4/5                    RIGHT UE - ShAB 5/5, EF 5/5, EE 3/5,   4/5                    LEFT    LE - HF 5/5, KE 5/5, DF 5/5, PF 5/5                    RIGHT LE - HF 5/5, KE 5/5, DF 5/5, PF 5/5        Sensory - Intact  to LT     Coordination - FTN/HTS intact   Psychiatric - Mood stable, Affect WNL  Skin:  Generalized ecchymosis/scabbing, facial blanchable redness, b/l elbows blanchable redness, R hand middle finger abrasion healed, moisture associated dermatitis groin, L hip healed abrasion, stage 1 scaral pressure injury   BLE toes blanchable redness

## 2025-01-27 NOTE — PROGRESS NOTE ADULT - SUBJECTIVE AND OBJECTIVE BOX
NP Note discussed with  Primary Attending    Patient is a 89y old  Female who presents with a chief complaint of acute encephalopathy , subacute stroke (26 Jan 2025 17:54)      INTERVAL HPI/OVERNIGHT EVENTS: no new complaints    MEDICATIONS  (STANDING):  apixaban 2.5 milliGRAM(s) Oral every 12 hours  atenolol  Tablet 50 milliGRAM(s) Oral daily  atorvastatin 40 milliGRAM(s) Oral daily  bisacodyl 5 milliGRAM(s) Oral at bedtime  escitalopram 20 milliGRAM(s) Oral daily  hydrocortisone 1% Cream 1 Application(s) Topical two times a day  polyethylene glycol 3350 17 Gram(s) Oral daily  senna 2 Tablet(s) Oral at bedtime  sodium chloride 0.9%. 1000 milliLiter(s) (60 mL/Hr) IV Continuous <Continuous>    MEDICATIONS  (PRN):  acetaminophen     Tablet .. 650 milliGRAM(s) Oral every 6 hours PRN Temp greater or equal to 38C (100.4F), Mild Pain (1 - 3)  albuterol    90 MICROgram(s) HFA Inhaler 2 Puff(s) Inhalation every 6 hours PRN for bronchospasm  aluminum hydroxide/magnesium hydroxide/simethicone Suspension 30 milliLiter(s) Oral every 4 hours PRN Dyspepsia  lactulose Syrup 10 Gram(s) Oral daily PRN constipation  melatonin 3 milliGRAM(s) Oral at bedtime PRN Insomnia  ondansetron Injectable 4 milliGRAM(s) IV Push every 8 hours PRN Nausea and/or Vomiting      __________________________________________________  REVIEW OF SYSTEMS:    CONSTITUTIONAL: No fever,   EYES: no acute visual disturbances  NECK: No pain or stiffness  RESPIRATORY: No cough; No shortness of breath  CARDIOVASCULAR: No chest pain, no palpitations  GASTROINTESTINAL: No pain. No nausea or vomiting; No diarrhea   NEUROLOGICAL: No headache or numbness, no tremors  MUSCULOSKELETAL: No joint pain, no muscle pain  GENITOURINARY: no dysuria, no frequency, no hesitancy  PSYCHIATRY: no depression , no anxiety  ALL OTHER  ROS negative        Vital Signs Last 24 Hrs  T(C): 36.3 (27 Jan 2025 05:17), Max: 36.8 (26 Jan 2025 21:14)  T(F): 97.4 (27 Jan 2025 05:17), Max: 98.3 (26 Jan 2025 21:14)  HR: 66 (27 Jan 2025 11:25) (66 - 94)  BP: 110/75 (27 Jan 2025 11:25) (104/66 - 131/81)  BP(mean): 87 (27 Jan 2025 11:25) (79 - 87)  RR: 18 (27 Jan 2025 05:17) (18 - 18)  SpO2: 97% (27 Jan 2025 11:25) (90% - 97%)    Parameters below as of 27 Jan 2025 11:25  Patient On (Oxygen Delivery Method): nasal cannula  O2 Flow (L/min): 2      ________________________________________________  PHYSICAL EXAM:  GENERAL: NAD  HEENT: Normocephalic;  conjunctivae and sclerae clear; moist mucous membranes;   NECK : supple  CHEST/LUNG: Clear to auscultation bilaterally with good air entry   HEART: S1 S2  regular; no murmurs, gallops or rubs  ABDOMEN: Soft, Nontender, Nondistended; Bowel sounds present  EXTREMITIES: no cyanosis; no edema; no calf tenderness  SKIN: warm and dry; no rash  NERVOUS SYSTEM:  Awake and alert; Oriented  to place, person and time ; no new deficits    _________________________________________________  LABS:                        12.9   9.06  )-----------( 278      ( 27 Jan 2025 06:10 )             39.5     01-27    137  |  103  |  11  ----------------------------<  113[H]  4.3   |  24  |  0.52    Ca    8.7      27 Jan 2025 06:10        Urinalysis Basic - ( 27 Jan 2025 06:10 )    Color: x / Appearance: x / SG: x / pH: x  Gluc: 113 mg/dL / Ketone: x  / Bili: x / Urobili: x   Blood: x / Protein: x / Nitrite: x   Leuk Esterase: x / RBC: x / WBC x   Sq Epi: x / Non Sq Epi: x / Bacteria: x      CAPILLARY BLOOD GLUCOSE            RADIOLOGY & ADDITIONAL TESTS:    Imaging Personally Reviewed:  YES/NO    Consultant(s) Notes Reviewed:   YES/ No    Care Discussed with Consultants :     Plan of care was discussed with patient and /or primary care giver; all questions and concerns were addressed and care was aligned with patient's wishes.

## 2025-01-27 NOTE — PROGRESS NOTE ADULT - PROVIDER SPECIALTY LIST ADULT
Internal Medicine
Cardiology
Cardiology
Internal Medicine
Neurology
Cardiology
Hospitalist
Cardiology
Hospitalist
Cardiology
Cardiology

## 2025-01-27 NOTE — DISCHARGE NOTE NURSING/CASE MANAGEMENT/SOCIAL WORK - PATIENT PORTAL LINK FT
You can access the FollowMyHealth Patient Portal offered by [facility] by registering at the following website: [email or web address]. By joining [facility]’s FollowMyHealth portal, you will also be able to view your health information using other applications (apps) compatible with our system.

## 2025-01-27 NOTE — H&P ADULT - NSHPLABSRESULTS_GEN_ALL_CORE
< from: 12 Lead ECG (02.08.19 @ 16:27) >    Diagnosis Line NORMAL SINUS RHYTHM  RIGHT BUNDLE BRANCH BLOCK  T WAVE ABNORMALITY, CONSIDER INFERIOR ISCHEMIA  ABNORMAL ECG    < end of copied text >    < from: 12 Lead ECG (01.19.25 @ 06:59) >    Atrial fibrillation with rapid ventricular response with premature ventricular or aberrantly conducted complexes  Low voltage QRS  Right bundle branch block  Marked ST abnormality, possible septal subendocardial injury  Abnormal ECG    < end of copied text >    < from: MR Head No Cont (01.20.25 @ 14:17) >    MRI of the brain was performed sagittal T1 axial T1 and T2 T2 FLAIR   diffusion and susceptibility sequence. Coronal T2-weighted sequence was   performed as well    This exam is compared with prior head CT performed on January 16, 2025    Parenchymal volume loss and chronic microvessel ischemic changes are   identified.    There is abnormalT2 prolongation with restricted diffusion seen   involving the right basal ganglia/anterior coronal radiata region. This   is compatible with an acute infarct. No hemorrhagic transformation is   seen. No significant shift or herniation is seen.    Abnormal T2 prolongation is seen involving the medial left   occipital/inferior parietal cortex. These findings do demonstrate mild   increased signal on the diffusion weighted sequence as well as minimal   decreased signal on the ADC mapping. These findings could be compatible   with subacute infarcts. No hemorrhagic transformation is seen. No   significant shift or herniation is seen.    Left frontal extra-axial lesion is seen (7-16) this finding measures   approximately 0.6 cm and could be compatible with a small meningioma.   Contrast enhanced MRI of the brain can be done for further evaluation.    The large vessels demonstrate normal flow voids.    The paranasal sinuses appear clear    Inflammatory change involving both mastoid regions leftgreater than   right.    Patient is status post bilateral cataract surgery.    IMPRESSION: Infarcts as described above.      < end of copied text >    Sodium: 137 mmol/L  Potassium: 4.3 mmol/L  Chloride: 103 mmol/L  Carbon Dioxide: 24 mmol/L  Anion Gap: 10 mmol/L  Blood Urea Nitrogen: 11 mg/dL  Creatinine: 0.52 mg/dL  Glucose: 113 mg/dL  Calcium: 8.7 mg/dL  eGFR: 87: Nucleated RBC: 0 /100 WBCs  WBC Count: 9.06 K/uL  RBC Count: 4.03 M/uL  Hemoglobin: 12.9 g/dL  Hematocrit: 39.5 %  Mean Cell Volume: 98.0 fl  Mean Cell Hemoglobin: 32.0 pg  Mean Cell Hemoglobin Conc: 32.7 g/dL  Red Cell Distrib Width: 14.0 %  Platelet Count - Automated: 278 K/uLLipid Profile (01.17.25 @ 10:38)   Cholesterol: 57 mg/dL  Triglycerides, Serum: 58 mg/dL  HDL Cholesterol: 24 mg/dL  Non HDL Cholesterol: 33: LDL Cholesterol Calculated: 19 mg/dLA1C with Estimated Average Glucose Result: 5.8: < from: 12 Lead ECG (02.08.19 @ 16:27) >    Diagnosis Line NORMAL SINUS RHYTHM  RIGHT BUNDLE BRANCH BLOCK  T WAVE ABNORMALITY, CONSIDER INFERIOR ISCHEMIA  ABNORMAL ECG    < from: 12 Lead ECG (01.19.25 @ 06:59) >    Atrial fibrillation with rapid ventricular response with premature ventricular or aberrantly conducted complexes  Low voltage QRS  Right bundle branch block  Marked ST abnormality, possible septal subendocardial injury  Abnormal ECG    < from: MR Head No Cont (01.20.25 @ 14:17) >    MRI of the brain was performed sagittal T1 axial T1 and T2 T2 FLAIR   diffusion and susceptibility sequence. Coronal T2-weighted sequence was   performed as well    This exam is compared with prior head CT performed on January 16, 2025    Parenchymal volume loss and chronic microvessel ischemic changes are   identified.    There is abnormalT2 prolongation with restricted diffusion seen   involving the right basal ganglia/anterior coronal radiata region. This   is compatible with an acute infarct. No hemorrhagic transformation is   seen. No significant shift or herniation is seen.    Abnormal T2 prolongation is seen involving the medial left   occipital/inferior parietal cortex. These findings do demonstrate mild   increased signal on the diffusion weighted sequence as well as minimal   decreased signal on the ADC mapping. These findings could be compatible   with subacute infarcts. No hemorrhagic transformation is seen. No   significant shift or herniation is seen.    Left frontal extra-axial lesion is seen (7-16) this finding measures   approximately 0.6 cm and could be compatible with a small meningioma.   Contrast enhanced MRI of the brain can be done for further evaluation.    The large vessels demonstrate normal flow voids.    The paranasal sinuses appear clear    Inflammatory change involving both mastoid regions leftgreater than   right.    Patient is status post bilateral cataract surgery.    IMPRESSION: Infarcts as described above.    Sodium: 137 mmol/L  Potassium: 4.3 mmol/L  Chloride: 103 mmol/L  Carbon Dioxide: 24 mmol/L  Anion Gap: 10 mmol/L  Blood Urea Nitrogen: 11 mg/dL  Creatinine: 0.52 mg/dL  Glucose: 113 mg/dL  Calcium: 8.7 mg/dL  eGFR: 87: Nucleated RBC: 0 /100 WBCs  WBC Count: 9.06 K/uL  RBC Count: 4.03 M/uL  Hemoglobin: 12.9 g/dL  Hematocrit: 39.5 %  Mean Cell Volume: 98.0 fl  Mean Cell Hemoglobin: 32.0 pg  Mean Cell Hemoglobin Conc: 32.7 g/dL  Red Cell Distrib Width: 14.0 %  Platelet Count - Automated: 278 K/uLLipid Profile (01.17.25 @ 10:38)   Cholesterol: 57 mg/dL  Triglycerides, Serum: 58 mg/dL  HDL Cholesterol: 24 mg/dL  Non HDL Cholesterol: 33: LDL Cholesterol Calculated: 19 mg/dLA1C with Estimated Average Glucose Result: 5.8:

## 2025-01-27 NOTE — H&P ADULT - HISTORY OF PRESENT ILLNESS
Patient is a 89 year old female with a PMH of HLD, paroxysmal Afib (not on AC at home, only on BB), SVT, rheumatoid arthritis, and Mycobacterium avium complex admitted to [facility] for acute encephalopathy and subacute CVA on 1/16/25. She had been found on the floor with AMS by her niece, who called EMS.   On arrival to ED,  patient had EKG which showed sinus tachycardia with RBB. Initial non con CTH with acute Vs subacute infarcts in Right BG and age indeterminate infarct in Left occipital lobe. Pt admitted for further work up and management of encephalopathy. Neuro consulted for stroke W/U. She was outside the window for TNK or antithrombolic on arrival to ED.  Patient was treated with aspirin suppository in the ED and admitted to tele.  Patient was monitored on tele monitor and no acute arrythmia were noted.  MRB w/ acute to subacute infarcts, CTA H/N w/no LVO or HGS and TTE w/ no evident potential embolic source    Of note, patient also found to meet SIRS sepsis criteria and completed course of antibiotics. Work-up remained negative for acute infection.    Patient was seen by PT with recommendations for acute IPR. Patient was deemed medially stable on 1/27/25 and transferred to [facility] for acute IPR.        Patient is a 89 year old female with a PMH of HLD, paroxysmal Afib (not on AC at home, only on BB), SVT, rheumatoid arthritis, and Mycobacterium avium complex admitted to [facility] for acute encephalopathy and subacute CVA on 1/16/25. She had been found on the floor with AMS by her niece, who called EMS.   On arrival to ED,  patient had EKG which showed sinus tachycardia with RBB. Initial non con CTH with acute Vs subacute infarcts in Right BG and age indeterminate infarct in Left occipital lobe. Pt admitted for further work up and management of encephalopathy. Neuro consulted for stroke W/U. She was outside the window for TNK or antithrombotic on arrival to ED.  Patient was treated with aspirin suppository in the ED and admitted to tele.  Patient was monitored on tele monitor and no acute arrythmia were noted.  MRB w/ acute to subacute infarcts, CTA H/N w/no LVO or HGS and TTE w/ no evident potential embolic source    Of note, patient also found to meet SIRS sepsis criteria and completed course of antibiotics. Work-up remained negative for acute infection.    Patient was seen by PT with recommendations for acute IPR. Patient was deemed medially stable on 1/27/25 and transferred to [facility] for acute IPR.

## 2025-01-27 NOTE — PATIENT PROFILE ADULT - VISION (WITH CORRECTIVE LENSES IF THE PATIENT USUALLY WEARS THEM):
unable to access/Normal vision: sees adequately in most situations; can see medication labels, newsprint

## 2025-01-27 NOTE — PROGRESS NOTE ADULT - REASON FOR ADMISSION
acute encephalopathy , subacute stroke

## 2025-01-27 NOTE — H&P ADULT - NSHPSOCIALHISTORY_GEN_ALL_CORE
SOCIAL HISTORY  Smoking - Denied  EtOH - Denied   Drugs - Denied    FUNCTIONAL HISTORY  Lives in a apartment on 5th floor with 0  and elevator access.   Prior Level of Function: Independent in ADLs and ambulation    CURRENT FUNCTIONAL STATUS  - Bed Mobility: min A   - Transfers: Min A   - Gait: 25ft with RW and min A   - ADLs: needs assist

## 2025-01-27 NOTE — H&P ADULT - ASSESSMENT
ASSESSMENT/PLAN  89 year-old female with a PMH of HLD, paroxysmal Afib (not on AC at home, only on BB), SVT, rheumatoid arthritis, and Mycobacterium avium complex   with Gait Instability, ADL impairments and Functional impairments.      #Gait Instability, ADL impairments and Functional impairments  - Start Comprehensive Rehab Program of PT/OT/SLP    #CVA  -Continue statin  -Started on eliquis 2.5mg BID for Pafib  -Per neuro, patient's LDL is 19 and statin is not needed   -Follow up with your PCP and Neurologist in 1 week from discharge from rehab      #Endo: HbA1C: 5.8   -Monitor fasting glucose levels on routine labs   -Follow up with PCP after dc from rehab      # Atrial fibrillation   -Continue AC with Eliquis   -Continue atenolol HR control   -Follow up with cardiologist 1 week after dc from rehab    #SIRS at referring hospital (WBC 16 with tachycardia)-resolved   -S/o Rocephin IV  -infectious w/u did not ret reveal acute infection     #history of Mycobacterium avium complex  -Use mometasone in lieu of flovent while at [facility]   -prn albuterol inhaler     #RA  -on Xeljanz at home, not on formulary at [facility], family will need to bring home supply     #Pain control  - Tylenol PRN    #GI/Bowel Mgmt   - Continue Senna at bedtime   - Miralax prn     #Bladder management  -  PVR once (SC if > 400)  -Monitor UO    #DVT  - Eliquis  - TEDs   -SCDs    #Skin:  -***    FEN   - Diet - DASH   - Dysphagia  SLP - evaluation and treatment    Precautions / PROPHYLAXIS:   - Falls, Cardiac, Seizure   - ortho: Weight bearing status: WBAT   - Lungs: Aspiration, Incentive Spirometer   - Pressure injury/Skin: Turn Q2hrs while in bed, OOB to Chair, PT/OT        Follow up with these providers:   General neurology at [address] [place] [address],  1-2 weeks after discharge. Please instruct the patient to call [phone]  to schedule this appointment.    PCP Dr Rhodes    MEDICAL PROGNOSIS: GOOD              REHAB POTENTIAL: GOOD              ESTIMATED DISPOSITION: HOME WITH HOME CARE              ELOS: 10-14 Days   EXPECTED THERAPY:     P.T. 1hr/day       O.T. 1hr/day      S.L.P. 1hr/day       P&O Unnecessary       EXP FREQUENCY: 5 days per 7 day period     PRESCREEN COMPARISON:   I have reviewed the prescreen information and I have found no relevant changes between the preadmission screening and my post admission evaluation     RATIONALE FOR INPATIENT ADMISSION - Patient demonstrates the following: (check all that apply)  [X] Medically appropriate for rehabilitation admission  [X] Has attainable rehab goals with an appropriate initial discharge plan  [X] Has rehabilitation potential (expected to make a significant improvement within a reasonable period of time)   [X] Requires close medical management by a rehab physician, rehab nursing care, Hospitalist and comprehensive interdisciplinary team (including PT, OT, & or SLP, Prosthetics and Orthotics)   ASSESSMENT/PLAN  89 year-old female with a PMH of HLD, paroxysmal Afib (not on AC at home, only on BB), SVT, rheumatoid arthritis, and Mycobacterium avium complex   with Gait Instability, ADL impairments and Functional impairments.      #Gait Instability, ADL impairments and Functional impairments  - Start Comprehensive Rehab Program of PT/OT/SLP    #CVA  -Continue statin  -Started on eliquis 2.5mg BID for Pafib  -Per neuro, patient's LDL is 19 and statin is not needed   -Follow up with your PCP and Neurologist in 1 week from discharge from rehab      #Endo: HbA1C: 5.8   -Monitor fasting glucose levels on routine labs   -Follow up with PCP after dc from rehab      # Atrial fibrillation   -Continue AC with Eliquis   -Continue atenolol HR control   -Follow up with cardiologist 1 week after dc from rehab    #SIRS at referring hospital (WBC 16 with tachycardia)-resolved   -S/o Rocephin IV  -infectious w/u did not ret reveal acute infection     #history of Mycobacterium avium complex  -Use mometasone in lieu of flovent while at [facility]   -prn albuterol inhaler     #RA  -on Xeljanz at home, not on formulary at [facility], family will need to bring home supply     #Pain control  - Tylenol PRN    #GI/Bowel Mgmt   - Continue Senna at bedtime   - Miralax prn   -Protonix for GI ppx while hospitalized    #Bladder management  -  PVR once (SC if > 400)  -Monitor UO    #DVT  - Eliquis  - TEDs   -SCDs    #Skin:  -***    FEN   - Diet - DASH   - Dysphagia  SLP - evaluation and treatment    Precautions / PROPHYLAXIS:   - Falls, Cardiac, Seizure   - ortho: Weight bearing status: WBAT   - Lungs: Aspiration, Incentive Spirometer   - Pressure injury/Skin: Turn Q2hrs while in bed, OOB to Chair, PT/OT        Follow up with these providers:   General neurology at [address] [place] [address],  1-2 weeks after discharge. Please instruct the patient to call [phone]  to schedule this appointment.    PCP Dr Rhodes    MEDICAL PROGNOSIS: GOOD              REHAB POTENTIAL: GOOD              ESTIMATED DISPOSITION: HOME WITH HOME CARE              ELOS: 10-14 Days   EXPECTED THERAPY:     P.T. 1hr/day       O.T. 1hr/day      S.L.P. 1hr/day       P&O Unnecessary       EXP FREQUENCY: 5 days per 7 day period     PRESCREEN COMPARISON:   I have reviewed the prescreen information and I have found no relevant changes between the preadmission screening and my post admission evaluation     RATIONALE FOR INPATIENT ADMISSION - Patient demonstrates the following: (check all that apply)  [X] Medically appropriate for rehabilitation admission  [X] Has attainable rehab goals with an appropriate initial discharge plan  [X] Has rehabilitation potential (expected to make a significant improvement within a reasonable period of time)   [X] Requires close medical management by a rehab physician, rehab nursing care, Hospitalist and comprehensive interdisciplinary team (including PT, OT, & or SLP, Prosthetics and Orthotics)   ASSESSMENT/PLAN  89 year-old female with a PMH of HLD, paroxysmal Afib (not on AC at home, only on BB), SVT, rheumatoid arthritis, and Mycobacterium avium complex   with Gait Instability, ADL impairments and Functional impairments.      #Gait Instability, ADL impairments and Functional impairments  - Start Comprehensive Rehab Program of PT/OT/SLP    #CVA  -Continue statin  -Started on eliquis 2.5mg BID for Pafib  -Per neuro, patient's LDL is 19 and statin is not needed   -Follow up with your PCP and Neurologist in 1 week from discharge from rehab    #Endo: HbA1C: 5.8   -Monitor fasting glucose levels on routine labs   -Follow up with PCP after dc from rehab    # Atrial fibrillation   -Continue AC with Eliquis   -Continue atenolol HR control   -Follow up with cardiologist 1 week after dc from rehab    #SIRS at referring hospital (WBC 16 with tachycardia)-resolved   -S/o Rocephin IV  -infectious w/u did not ret reveal acute infection     #history of Mycobacterium avium complex  -CTA chest 1/24--> Bilateral pleural effusions and lower lobe passive atelectasis. Bilateral groundglass opacities within the upper lobes, lingula, and lower lobes and centrilobular nodules are of uncertain etilogy. The right lower lobe ill-defined 1.6 x 0.8 cm opacity.  -Repeat CT chest without contrast is recommended in 8- weeks to evaluate for resolution/change.  -Use mometasone in lieu of flovent while at [facility]   -prn albuterol inhaler   -o2 sats between 88-91% on RA on admission---> begin 2L NC, may wean as appropriate     #RA  -on Xeljanz at home, not on formulary at [facility], family will need to bring home supply     #Pain control  - Tylenol PRN    #GI/Bowel Mgmt   - Continue Senna at bedtime   - Miralax prn   - Protonix for GI ppx while hospitalized    #Bladder management  - PVR once (SC if > 400)  -Monitor UO    #DVT  - Eliquis  -TEDs     #BLE calf pain  - Duplex US---> r/o DVT    #Skin:  - Generalized ecchymosis/scabbing, facial blanchable redness, b/l elbows blanchable redness, R hand middle finger abrasion healed, moisture associated dermatitis groin, L hip healed abrasion, pressure injury to coccyx w/ slough 1.2x0.5cm, BLE toes blanchable redness  - Nystatin BID  - Wound care consult appreciated     FEN   - Diet - DASH   - Dysphagia  SLP - evaluation and treatment    Precautions / PROPHYLAXIS:   - Falls, Cardiac, Seizure   - ortho: Weight bearing status: WBAT   - Lungs: Aspiration, Incentive Spirometer   - Pressure injury/Skin: Turn Q2hrs while in bed, OOB to Chair, PT/OT        Follow up with these providers:   General neurology at [address] [place] [address],  1-2 weeks after discharge. Please instruct the patient to call [phone]  to schedule this appointment.    PCP Dr Rhodes    MEDICAL PROGNOSIS: GOOD              REHAB POTENTIAL: GOOD              ESTIMATED DISPOSITION: HOME WITH HOME CARE              ELOS: 10-14 Days   EXPECTED THERAPY:     P.T. 1hr/day       O.T. 1hr/day      S.L.P. 1hr/day       P&O Unnecessary       EXP FREQUENCY: 5 days per 7 day period     PRESCREEN COMPARISON:   I have reviewed the prescreen information and I have found no relevant changes between the preadmission screening and my post admission evaluation     RATIONALE FOR INPATIENT ADMISSION - Patient demonstrates the following: (check all that apply)  [X] Medically appropriate for rehabilitation admission  [X] Has attainable rehab goals with an appropriate initial discharge plan  [X] Has rehabilitation potential (expected to make a significant improvement within a reasonable period of time)   [X] Requires close medical management by a rehab physician, rehab nursing care, Hospitalist and comprehensive interdisciplinary team (including PT, OT, & or SLP, Prosthetics and Orthotics)   ASSESSMENT/PLAN  89 year-old female with a PMH of HLD, paroxysmal Afib (not on AC at home, only on BB), SVT, rheumatoid arthritis, and Mycobacterium avium complex   with Gait Instability, ADL impairments and Functional impairments.      #Gait Instability, ADL impairments and Functional impairments  - Start Comprehensive Rehab Program of PT/OT/SLP    #CVA  -Continue statin  -Started on eliquis 2.5mg BID for Pafib  -Per neuro, patient's LDL is 19 and statin is not needed   -Follow up with your PCP and Neurologist in 1 week from discharge from rehab    #Endo: HbA1C: 5.8   -Monitor fasting glucose levels on routine labs   -Follow up with PCP after dc from rehab    # Atrial fibrillation   -Continue AC with Eliquis   -Continue atenolol HR control   -Follow up with cardiologist 1 week after dc from rehab    #SIRS at referring hospital (WBC 16 with tachycardia)-resolved   -S/o Rocephin IV  -infectious w/u did not ret reveal acute infection     #history of Mycobacterium avium complex  -CTA chest 1/24--> Bilateral pleural effusions and lower lobe passive atelectasis. Bilateral groundglass opacities within the upper lobes, lingula, and lower lobes and centrilobular nodules are of uncertain etilogy. The right lower lobe ill-defined 1.6 x 0.8 cm opacity.  -Repeat CT chest without contrast is recommended in 8- weeks to evaluate for resolution/change.  -Use mometasone in lieu of flovent while at [facility]   -prn albuterol inhaler   -o2 sats between 88-91% on RA on admission---> begin 2L NC, may wean as appropriate     #RA  -on Xeljanz at home, not on formulary at [facility], family will need to bring home supply     #Pain control  - Tylenol PRN    #GI/Bowel Mgmt   - Continue Senna at bedtime   - Miralax prn   - Protonix for GI ppx while hospitalized    #Bladder management  - PVR once (SC if > 400)  -Monitor UO    #DVT  - Eliquis  -TEDs     #BLE calf pain  - Duplex US---> r/o DVT    #Skin:  - Generalized ecchymosis/scabbing, facial blanchable redness, b/l elbows blanchable redness, R hand middle finger abrasion healed, moisture associated dermatitis groin, L hip healed abrasion, pressure injury to coccyx w/ slough 1.2x0.5cm, BLE toes blanchable redness  - Nystatin BID  - Lac hydrin BID   - Wound care consult appreciated     FEN   - Diet - DASH   - Dysphagia  SLP - evaluation and treatment    Precautions / PROPHYLAXIS:   - Falls, Cardiac, Seizure   - ortho: Weight bearing status: WBAT   - Lungs: Aspiration, Incentive Spirometer   - Pressure injury/Skin: Turn Q2hrs while in bed, OOB to Chair, PT/OT        Follow up with these providers:   General neurology at [facility], [facility],  1-2 weeks after discharge. Please instruct the patient to call [phone]  to schedule this appointment.    PCP Dr Rhodes    MEDICAL PROGNOSIS: GOOD              REHAB POTENTIAL: GOOD              ESTIMATED DISPOSITION: HOME WITH HOME CARE              ELOS: 10-14 Days   EXPECTED THERAPY:     P.T. 1hr/day       O.T. 1hr/day      S.L.P. 1hr/day       P&O Unnecessary       EXP FREQUENCY: 5 days per 7 day period     PRESCREEN COMPARISON:   I have reviewed the prescreen information and I have found no relevant changes between the preadmission screening and my post admission evaluation     RATIONALE FOR INPATIENT ADMISSION - Patient demonstrates the following: (check all that apply)  [X] Medically appropriate for rehabilitation admission  [X] Has attainable rehab goals with an appropriate initial discharge plan  [X] Has rehabilitation potential (expected to make a significant improvement within a reasonable period of time)   [X] Requires close medical management by a rehab physician, rehab nursing care, Hospitalist and comprehensive interdisciplinary team (including PT, OT, & or SLP, Prosthetics and Orthotics)   ASSESSMENT/PLAN  89 year-old female with a PMH of HLD, paroxysmal Afib (not on AC at home, only on BB), SVT, rheumatoid arthritis, and Mycobacterium avium complex   with Gait Instability, ADL impairments and Functional impairments.      #Gait Instability, ADL impairments and Functional impairments  - Start Comprehensive Rehab Program of PT/OT/SLP    #CVA  -Continue statin  -Started on eliquis 2.5mg BID for Pafib  -Per neuro, patient's LDL is 19 and statin is not needed   -Follow up with your PCP and Neurologist in 1 week from discharge from rehab    #Endo: HbA1C: 5.8   -Monitor fasting glucose levels on routine labs   -Follow up with PCP after dc from rehab    # Atrial fibrillation   -Continue AC with Eliquis   -Continue atenolol HR control   -Follow up with cardiologist 1 week after dc from rehab    #SIRS at referring hospital (WBC 16 with tachycardia)-resolved   -S/o Rocephin IV  -infectious w/u did not ret reveal acute infection     #history of Mycobacterium avium complex  -CTA chest 1/24--> Bilateral pleural effusions and lower lobe passive atelectasis. Bilateral groundglass opacities within the upper lobes, lingula, and lower lobes and centrilobular nodules are of uncertain etilogy. The right lower lobe ill-defined 1.6 x 0.8 cm opacity.  -Repeat CT chest without contrast is recommended in 8- weeks to evaluate for resolution/change.  -Use mometasone in lieu of flovent while at [facility]   -prn albuterol inhaler   -o2 sats between 88-91% on RA on admission---> begin 2L NC, may wean as appropriate     #RA  -on Xeljanz at home, not on formulary at [facility], family will need to bring home supply     #Pain control  - Tylenol PRN    #GI/Bowel Mgmt   - Continue Senna at bedtime   - Miralax prn   - Protonix for GI ppx while hospitalized    #Bladder management  - PVR once (SC if > 400)  -Monitor UO    #DVT  - Eliquis  -TEDs     #BLE calf pain  - Duplex US---> r/o DVT    #Skin:  - stage 1 pressure ulcer scarum  -turn and position q2h   - Nystatin BID  - Lac hydrin BID   - Wound care consult appreciated     FEN   - Diet - DASH   - Dysphagia  SLP - evaluation and treatment    Precautions / PROPHYLAXIS:   - Falls, Cardiac, Seizure   - ortho: Weight bearing status: WBAT   - Lungs: Aspiration, Incentive Spirometer   - Pressure injury/Skin: Turn Q2hrs while in bed, OOB to Chair, PT/OT        Follow up with these providers:   General neurology at [facility],  1-2 weeks after discharge. Please instruct the patient to call [phone]  to schedule this appointment.    PCP Dr Rhodes

## 2025-01-27 NOTE — H&P ADULT - NS ATTEND AMEND GEN_ALL_CORE FT
ASSESSMENT/PLAN  89 year-old female with a PMH of HLD, paroxysmal Afib (not on AC at home, only on BB), SVT, rheumatoid arthritis, and Mycobacterium avium complex   with Gait Instability, ADL impairments and Functional impairments.      #Gait Instability, ADL impairments and Functional impairments  - Start Comprehensive Rehab Program of PT/OT/SLP    #CVA  -Continue statin  -Started on eliquis 2.5mg BID for Pafib  -Per neuro, patient's LDL is 19 and statin is not needed   -Follow up with your PCP and Neurologist in 1 week from discharge from rehab

## 2025-01-28 LAB
ALBUMIN SERPL ELPH-MCNC: 2.2 G/DL — LOW (ref 3.3–5)
ALP SERPL-CCNC: 88 U/L — SIGNIFICANT CHANGE UP (ref 40–120)
ALT FLD-CCNC: 28 U/L — SIGNIFICANT CHANGE UP (ref 10–45)
ANION GAP SERPL CALC-SCNC: 6 MMOL/L — SIGNIFICANT CHANGE UP (ref 5–17)
AST SERPL-CCNC: 35 U/L — SIGNIFICANT CHANGE UP (ref 10–40)
BASOPHILS # BLD AUTO: 0.05 K/UL — SIGNIFICANT CHANGE UP (ref 0–0.2)
BASOPHILS NFR BLD AUTO: 0.7 % — SIGNIFICANT CHANGE UP (ref 0–2)
BILIRUB SERPL-MCNC: 0.9 MG/DL — SIGNIFICANT CHANGE UP (ref 0.2–1.2)
BUN SERPL-MCNC: 10 MG/DL — SIGNIFICANT CHANGE UP (ref 7–23)
CALCIUM SERPL-MCNC: 8.5 MG/DL — SIGNIFICANT CHANGE UP (ref 8.4–10.5)
CHLORIDE SERPL-SCNC: 102 MMOL/L — SIGNIFICANT CHANGE UP (ref 96–108)
CO2 SERPL-SCNC: 29 MMOL/L — SIGNIFICANT CHANGE UP (ref 22–31)
CREAT SERPL-MCNC: 0.64 MG/DL — SIGNIFICANT CHANGE UP (ref 0.5–1.3)
EGFR: 83 ML/MIN/1.73M2 — SIGNIFICANT CHANGE UP
EOSINOPHIL # BLD AUTO: 0.22 K/UL — SIGNIFICANT CHANGE UP (ref 0–0.5)
EOSINOPHIL NFR BLD AUTO: 3 % — SIGNIFICANT CHANGE UP (ref 0–6)
GLUCOSE SERPL-MCNC: 90 MG/DL — SIGNIFICANT CHANGE UP (ref 70–99)
HCT VFR BLD CALC: 38.3 % — SIGNIFICANT CHANGE UP (ref 34.5–45)
HGB BLD-MCNC: 12.8 G/DL — SIGNIFICANT CHANGE UP (ref 11.5–15.5)
IMM GRANULOCYTES NFR BLD AUTO: 0.7 % — SIGNIFICANT CHANGE UP (ref 0–0.9)
LYMPHOCYTES # BLD AUTO: 0.49 K/UL — LOW (ref 1–3.3)
LYMPHOCYTES # BLD AUTO: 6.6 % — LOW (ref 13–44)
MCHC RBC-ENTMCNC: 32.3 PG — SIGNIFICANT CHANGE UP (ref 27–34)
MCHC RBC-ENTMCNC: 33.4 G/DL — SIGNIFICANT CHANGE UP (ref 32–36)
MCV RBC AUTO: 96.7 FL — SIGNIFICANT CHANGE UP (ref 80–100)
MONOCYTES # BLD AUTO: 0.71 K/UL — SIGNIFICANT CHANGE UP (ref 0–0.9)
MONOCYTES NFR BLD AUTO: 9.6 % — SIGNIFICANT CHANGE UP (ref 2–14)
NEUTROPHILS # BLD AUTO: 5.85 K/UL — SIGNIFICANT CHANGE UP (ref 1.8–7.4)
NEUTROPHILS NFR BLD AUTO: 79.4 % — HIGH (ref 43–77)
NRBC # BLD: 0 /100 WBCS — SIGNIFICANT CHANGE UP (ref 0–0)
NRBC BLD-RTO: 0 /100 WBCS — SIGNIFICANT CHANGE UP (ref 0–0)
PLATELET # BLD AUTO: 256 K/UL — SIGNIFICANT CHANGE UP (ref 150–400)
POTASSIUM SERPL-MCNC: 3.7 MMOL/L — SIGNIFICANT CHANGE UP (ref 3.5–5.3)
POTASSIUM SERPL-SCNC: 3.7 MMOL/L — SIGNIFICANT CHANGE UP (ref 3.5–5.3)
PROT SERPL-MCNC: 7 G/DL — SIGNIFICANT CHANGE UP (ref 6–8.3)
RBC # BLD: 3.96 M/UL — SIGNIFICANT CHANGE UP (ref 3.8–5.2)
RBC # FLD: 14 % — SIGNIFICANT CHANGE UP (ref 10.3–14.5)
SODIUM SERPL-SCNC: 137 MMOL/L — SIGNIFICANT CHANGE UP (ref 135–145)
WBC # BLD: 7.37 K/UL — SIGNIFICANT CHANGE UP (ref 3.8–10.5)
WBC # FLD AUTO: 7.37 K/UL — SIGNIFICANT CHANGE UP (ref 3.8–10.5)

## 2025-01-28 PROCEDURE — 99223 1ST HOSP IP/OBS HIGH 75: CPT | Mod: GC

## 2025-01-28 PROCEDURE — 99223 1ST HOSP IP/OBS HIGH 75: CPT

## 2025-01-28 PROCEDURE — 93970 EXTREMITY STUDY: CPT | Mod: 26

## 2025-01-28 RX ADMIN — APIXABAN 2.5 MILLIGRAM(S): 5 TABLET, FILM COATED ORAL at 17:29

## 2025-01-28 RX ADMIN — PANTOPRAZOLE 40 MILLIGRAM(S): 20 TABLET, DELAYED RELEASE ORAL at 05:45

## 2025-01-28 RX ADMIN — ATORVASTATIN CALCIUM 40 MILLIGRAM(S): 80 TABLET, FILM COATED ORAL at 20:49

## 2025-01-28 RX ADMIN — MOMETASONE FUROATE 2 PUFF(S): 220 INHALANT RESPIRATORY (INHALATION) at 08:30

## 2025-01-28 RX ADMIN — Medication 50 MILLIGRAM(S): at 05:46

## 2025-01-28 RX ADMIN — NYSTATIN 1 APPLICATION(S): 100000 POWDER TOPICAL at 05:46

## 2025-01-28 RX ADMIN — Medication 1 APPLICATION(S): at 17:30

## 2025-01-28 RX ADMIN — Medication 1 APPLICATION(S): at 05:47

## 2025-01-28 RX ADMIN — ESCITALOPRAM 20 MILLIGRAM(S): 10 TABLET, FILM COATED ORAL at 12:27

## 2025-01-28 RX ADMIN — Medication 2 TABLET(S): at 20:49

## 2025-01-28 RX ADMIN — NYSTATIN 1 APPLICATION(S): 100000 POWDER TOPICAL at 17:30

## 2025-01-28 RX ADMIN — APIXABAN 2.5 MILLIGRAM(S): 5 TABLET, FILM COATED ORAL at 05:45

## 2025-01-28 NOTE — DIETITIAN INITIAL EVALUATION ADULT - NS FNS DIET ORDER
Diet, Soft and Bite Sized (01-28-25 @ 11:09)  Recommend Initiate Ensure Plus High Protein 8oz PO BID (Provides 700kcal & 40grams of Protein)

## 2025-01-28 NOTE — DIETITIAN NUTRITION RISK NOTIFICATION - TREATMENT: THE FOLLOWING DIET HAS BEEN RECOMMENDED
Diet, Soft and Bite Sized (01-28-25 @ 11:09) [Active]    Recommend Initiate Ensure Plus High Protein 8oz PO BID (Provides 700kcal & 40grams of Protein)

## 2025-01-28 NOTE — DIETITIAN NUTRITION RISK NOTIFICATION - MALNUTRITION EVALUATION AS DEMONSTRATED BY (ADULTS > 20 YEARS OF AGE)
[Mother] : mother [Yes] : Patient goes to dentist yearly [Up to date] : Up to date [Grade: ____] : Grade: [unfilled] [Uses tobacco] : does not use tobacco [Uses drugs] : does not use drugs  [Drinks alcohol] : drinks alcohol Loss of subcutaneous fat.../Loss of muscle... [No] : Patient has not had sexual intercourse [With Teen] : teen [de-identified] : 19 yo [facility] [de-identified] : none [de-identified] : tennis [de-identified] : rare usage

## 2025-01-28 NOTE — DIETITIAN INITIAL EVALUATION ADULT - PERTINENT MEDS FT
MEDICATIONS  (STANDING):  ammonium lactate 12% Lotion 1 Application(s) Topical two times a day  apixaban 2.5 milliGRAM(s) Oral every 12 hours  atenolol  Tablet 50 milliGRAM(s) Oral daily  atorvastatin 40 milliGRAM(s) Oral at bedtime  escitalopram 20 milliGRAM(s) Oral daily  mometasone 220 MICROgram(s) Inhaler 2 Puff(s) Inhalation daily  nystatin Powder 1 Application(s) Topical two times a day  pantoprazole    Tablet 40 milliGRAM(s) Oral before breakfast  senna 2 Tablet(s) Oral at bedtime    MEDICATIONS  (PRN):  acetaminophen     Tablet .. 650 milliGRAM(s) Oral every 6 hours PRN Mild Pain (1 - 3), Moderate Pain (4 - 6), Severe Pain (7 - 10)  albuterol    90 MICROgram(s) HFA Inhaler 2 Puff(s) Inhalation every 6 hours PRN Shortness of Breath and/or Wheezing  polyethylene glycol 3350 17 Gram(s) Oral daily PRN Constipation

## 2025-01-28 NOTE — DIETITIAN INITIAL EVALUATION ADULT - ENTER FROM (G/KG)
Pt states she sent blood pressure log last night. The blood pressure log night 8-6-24 message pb  ----- Message from Kadie Sheffield sent at 9/9/2024  2:07 PM CDT -----  Type:  Patient Returning Call    Who Called: Thelma   Who Left Message for Patient:nurse   Does the patient know what this is regarding?:missed the c all  Would the patient rather a call back or a response via [facility]? call  Best Call Back Number:[phone]    Additional Information:  
1.5

## 2025-01-28 NOTE — DIETITIAN INITIAL EVALUATION ADULT - OTHER INFO
Initial Nutrition Assessment   89yr Old Female   No known Food Allergy/Intolerance per EMR  Tolerates Diet Well - No Chewing/Swallowing Complications (Per Nursing staff)  No Pressure Ulcers (as Per Nursing Flow Sheets)  No Edema Noted (as Per Nursing Flow Sheets)  No Recent Constipation (as Per nursing staff)

## 2025-01-28 NOTE — CHART NOTE - NSCHARTNOTESELECT_GEN_ALL_CORE
CT follow up/Event Note
Event Note
Progress/Event Note
Desat to 88% on room air/Event Note
Event Note
Transition Care Note

## 2025-01-28 NOTE — DIETITIAN INITIAL EVALUATION ADULT - PERTINENT LABORATORY DATA
01-28    137  |  102  |  10  ----------------------------<  90  3.7   |  29  |  0.64    Ca    8.5      28 Jan 2025 08:06    TPro  7.0  /  Alb  2.2[L]  /  TBili  0.9  /  DBili  x   /  AST  35  /  ALT  28  /  AlkPhos  88  01-28  A1C with Estimated Average Glucose Result: 5.8 % (01-17-25 @ 10:38)

## 2025-01-28 NOTE — DIETITIAN INITIAL EVALUATION ADULT - ADD RECOMMEND
1) Monitor Weights, Intake, Tolerance, Skin & Labwork  2) Ensure Plus High Protein 8oz PO BID (Provides 700kcal & 40grams of Protein)   3) Continue Nutrition Plan of Care

## 2025-01-29 PROCEDURE — 71045 X-RAY EXAM CHEST 1 VIEW: CPT | Mod: 26

## 2025-01-29 PROCEDURE — 74230 X-RAY XM SWLNG FUNCJ C+: CPT | Mod: 26

## 2025-01-29 PROCEDURE — 99233 SBSQ HOSP IP/OBS HIGH 50: CPT

## 2025-01-29 PROCEDURE — 99232 SBSQ HOSP IP/OBS MODERATE 35: CPT | Mod: GC

## 2025-01-29 RX ORDER — ALBUTEROL 90 MCG
2 AEROSOL REFILL (GRAM) INHALATION EVERY 6 HOURS
Refills: 0 | Status: DISCONTINUED | OUTPATIENT
Start: 2025-01-29 | End: 2025-02-06

## 2025-01-29 RX ORDER — ALBUTEROL 90 MCG
2.5 AEROSOL REFILL (GRAM) INHALATION EVERY 6 HOURS
Refills: 0 | Status: DISCONTINUED | OUTPATIENT
Start: 2025-01-29 | End: 2025-02-06

## 2025-01-29 RX ORDER — ALBUTEROL 90 MCG
2 AEROSOL REFILL (GRAM) INHALATION EVERY 6 HOURS
Refills: 0 | Status: DISCONTINUED | OUTPATIENT
Start: 2025-01-29 | End: 2025-01-29

## 2025-01-29 RX ORDER — ACETAMINOPHEN, DIPHENHYDRAMINE HCL, PHENYLEPHRINE HCL 325; 25; 5 MG/1; MG/1; MG/1
3 TABLET ORAL AT BEDTIME
Refills: 0 | Status: DISCONTINUED | OUTPATIENT
Start: 2025-01-29 | End: 2025-02-06

## 2025-01-29 RX ORDER — BUDESONIDE 9 MG/1
0.5 TABLET, EXTENDED RELEASE ORAL EVERY 12 HOURS
Refills: 0 | Status: DISCONTINUED | OUTPATIENT
Start: 2025-01-29 | End: 2025-02-06

## 2025-01-29 RX ADMIN — Medication 1 APPLICATION(S): at 05:27

## 2025-01-29 RX ADMIN — MOMETASONE FUROATE 2 PUFF(S): 220 INHALANT RESPIRATORY (INHALATION) at 08:33

## 2025-01-29 RX ADMIN — NYSTATIN 1 APPLICATION(S): 100000 POWDER TOPICAL at 05:27

## 2025-01-29 RX ADMIN — Medication 1 APPLICATION(S): at 17:07

## 2025-01-29 RX ADMIN — Medication 2 PUFF(S): at 04:15

## 2025-01-29 RX ADMIN — Medication 2 TABLET(S): at 21:46

## 2025-01-29 RX ADMIN — APIXABAN 2.5 MILLIGRAM(S): 5 TABLET, FILM COATED ORAL at 05:25

## 2025-01-29 RX ADMIN — ESCITALOPRAM 20 MILLIGRAM(S): 10 TABLET, FILM COATED ORAL at 12:22

## 2025-01-29 RX ADMIN — ACETAMINOPHEN, DIPHENHYDRAMINE HCL, PHENYLEPHRINE HCL 3 MILLIGRAM(S): 325; 25; 5 TABLET ORAL at 21:46

## 2025-01-29 RX ADMIN — Medication 50 MILLIGRAM(S): at 05:25

## 2025-01-29 RX ADMIN — PANTOPRAZOLE 40 MILLIGRAM(S): 20 TABLET, DELAYED RELEASE ORAL at 05:25

## 2025-01-29 RX ADMIN — NYSTATIN 1 APPLICATION(S): 100000 POWDER TOPICAL at 17:08

## 2025-01-29 RX ADMIN — Medication 600 MILLIGRAM(S): at 17:07

## 2025-01-29 RX ADMIN — APIXABAN 2.5 MILLIGRAM(S): 5 TABLET, FILM COATED ORAL at 17:07

## 2025-01-29 RX ADMIN — ATORVASTATIN CALCIUM 40 MILLIGRAM(S): 80 TABLET, FILM COATED ORAL at 21:48

## 2025-01-29 NOTE — PROGRESS NOTE ADULT - SUBJECTIVE AND OBJECTIVE BOX
HPI:  Patient is a 89 year old female with a PMH of HLD, paroxysmal Afib (not on AC at home, only on BB), SVT, rheumatoid arthritis, and Mycobacterium avium complex admitted to [facility] for acute encephalopathy and subacute CVA on 1/16/25. She had been found on the floor with AMS by her niece, who called EMS.   On arrival to ED,  patient had EKG which showed sinus tachycardia with RBB. Initial non con CTH with acute Vs subacute infarcts in Right BG and age indeterminate infarct in Left occipital lobe. Pt admitted for further work up and management of encephalopathy. Neuro consulted for stroke W/U. She was outside the window for TNK or antithrombotic on arrival to ED.  Patient was treated with aspirin suppository in the ED and admitted to tele.  Patient was monitored on tele monitor and no acute arrythmia were noted.  MRB w/ acute to subacute infarcts, CTA H/N w/no LVO or HGS and TTE w/ no evident potential embolic source    Of note, patient also found to meet SIRS sepsis criteria and completed course of antibiotics. Work-up remained negative for acute infection.    Patient was seen by PT with recommendations for acute IPR. Patient was deemed medially stable on 1/27/25 and transferred to [facility] for acute IPR.     NAD in bed, drowsy, easily awaken to voice, alert to self and place, follows commands.  Wheezing this am-albuterol given, CXR reading pending, afebrile. Hospitalist to see. ?nebs needed.  O2 sats 80s RA. NC in place.  No HA, no chest pain, no palpitations.  Tolerating Eliquis-no overt bleeding.  Will add Melatonin at bedtime-impaired sleep reported.  s/p MBS today    MEDICATIONS  (STANDING):  ammonium lactate 12% Lotion 1 Application(s) Topical two times a day  apixaban 2.5 milliGRAM(s) Oral every 12 hours  atenolol  Tablet 50 milliGRAM(s) Oral daily  atorvastatin 40 milliGRAM(s) Oral at bedtime  buDESOnide    Inhalation Suspension 0.5 milliGRAM(s) Inhalation every 12 hours  escitalopram 20 milliGRAM(s) Oral daily  guaiFENesin  milliGRAM(s) Oral every 12 hours  nystatin Powder 1 Application(s) Topical two times a day  pantoprazole    Tablet 40 milliGRAM(s) Oral before breakfast  senna 2 Tablet(s) Oral at bedtime    MEDICATIONS  (PRN):  acetaminophen     Tablet .. 650 milliGRAM(s) Oral every 6 hours PRN Mild Pain (1 - 3), Moderate Pain (4 - 6), Severe Pain (7 - 10)  albuterol    90 MICROgram(s) HFA Inhaler 2 Puff(s) Inhalation every 6 hours PRN Shortness of Breath and/or Wheezing  polyethylene glycol 3350 17 Gram(s) Oral daily PRN Constipation                            12.8   7.37  )-----------( 256      ( 28 Jan 2025 08:06 )             38.3     01-28    137  |  102  |  10  ----------------------------<  90  3.7   |  29  |  0.64    Ca    8.5      28 Jan 2025 08:06    TPro  7.0  /  Alb  2.2[L]  /  TBili  0.9  /  DBili  x   /  AST  35  /  ALT  28  /  AlkPhos  88  01-28    Urinalysis Basic - ( 28 Jan 2025 08:06 )    Color: x / Appearance: x / SG: x / pH: x  Gluc: 90 mg/dL / Ketone: x  / Bili: x / Urobili: x   Blood: x / Protein: x / Nitrite: x   Leuk Esterase: x / RBC: x / WBC x   Sq Epi: x / Non Sq Epi: x / Bacteria: x      Vital Signs Last 24 Hrs  T(C): 37.1 (29 Jan 2025 08:41), Max: 37.1 (29 Jan 2025 08:41)  T(F): 98.8 (29 Jan 2025 08:41), Max: 98.8 (29 Jan 2025 08:41)  HR: 90 (29 Jan 2025 08:41) (89 - 99)  BP: 137/86 (29 Jan 2025 08:41) (124/70 - 149/88)  BP(mean): --  RR: 15 (29 Jan 2025 08:41) (15 - 16)  SpO2: 92% (29 Jan 2025 08:41) (92% - 95%)    Parameters below as of 29 Jan 2025 08:41  Patient On (Oxygen Delivery Method): nasal cannula  O2 Flow (L/min): 2     Gen - drowsy/ frail appearing  HEENT - EOMI, MMM, PERRLA, Normal Conjunctivae  Neck - Supple  Pulm - wheezing, crackles, diminished   Cardiovascular - RRR  Abdomen - Soft, NT/ND, +BS  Extremities -  BLE trace edema  Neuro-     Cognitive - awake, alert, oriented to person, place and year. Follows commands well.     Communication - Fluent, +Hypophonic     Attention: Intact     Cranial Nerves -No new deficits     Motor -                     LEFT    UE - ShAB 5/5, EF 5/5, EE 3/5,  4/5                    RIGHT UE - ShAB 5/5, EF 5/5, EE 3/5,   4/5                    LEFT    LE - HF 5/5, KE 5/5, DF 5/5, PF 5/5                    RIGHT LE - HF 5/5, KE 5/5, DF 5/5, PF 5/5        Sensory - Intact  to LT  Psychiatric - Mood stable, Affect WNL  Skin:  Generalized ecchymosis/scabbing, facial blanchable redness, b/l elbows blanchable redness, R hand middle finger abrasion healed, moisture associated dermatitis groin, L hip healed abrasion, stage 1 sacral pressure injury      Continue comprehensive acute rehab program consisting of 3hrs/day of OT/PT and SLP. HPI:  Patient is a 89 year old female with a PMH of HLD, paroxysmal Afib (not on AC at home, only on BB), SVT, rheumatoid arthritis, and Mycobacterium avium complex admitted to [facility] for acute encephalopathy and subacute CVA on 1/16/25. She had been found on the floor with AMS by her niece, who called EMS.   On arrival to ED,  patient had EKG which showed sinus tachycardia with RBB. Initial non con CTH with acute Vs subacute infarcts in Right BG and age indeterminate infarct in Left occipital lobe. Pt admitted for further work up and management of encephalopathy. Neuro consulted for stroke W/U. She was outside the window for TNK or antithrombotic on arrival to ED.  Patient was treated with aspirin suppository in the ED and admitted to tele.  Patient was monitored on tele monitor and no acute arrythmia were noted.  MRB w/ acute to subacute infarcts, CTA H/N w/no LVO or HGS and TTE w/ no evident potential embolic source    Of note, patient also found to meet SIRS sepsis criteria and completed course of antibiotics. Work-up remained negative for acute infection.    Patient was seen by PT with recommendations for acute IPR. Patient was deemed medially stable on 1/27/25 and transferred to [facility] for acute IPR.     NAD in bed, drowsy, easily awaken to voice, alert to self and place, follows commands.  Wheezing this am-albuterol given, CXR reading pending, afebrile. Hospitalist to see. ?nebs needed.  O2 sats 80s RA. NC in place. Pulmonary consult requested.  No HA, no chest pain, no palpitations.  Tolerating Eliquis-no overt bleeding.  Will add Melatonin at bedtime-impaired sleep reported.  s/p MBS today    MEDICATIONS  (STANDING):  ammonium lactate 12% Lotion 1 Application(s) Topical two times a day  apixaban 2.5 milliGRAM(s) Oral every 12 hours  atenolol  Tablet 50 milliGRAM(s) Oral daily  atorvastatin 40 milliGRAM(s) Oral at bedtime  buDESOnide    Inhalation Suspension 0.5 milliGRAM(s) Inhalation every 12 hours  escitalopram 20 milliGRAM(s) Oral daily  guaiFENesin  milliGRAM(s) Oral every 12 hours  nystatin Powder 1 Application(s) Topical two times a day  pantoprazole    Tablet 40 milliGRAM(s) Oral before breakfast  senna 2 Tablet(s) Oral at bedtime    MEDICATIONS  (PRN):  acetaminophen     Tablet .. 650 milliGRAM(s) Oral every 6 hours PRN Mild Pain (1 - 3), Moderate Pain (4 - 6), Severe Pain (7 - 10)  albuterol    90 MICROgram(s) HFA Inhaler 2 Puff(s) Inhalation every 6 hours PRN Shortness of Breath and/or Wheezing  polyethylene glycol 3350 17 Gram(s) Oral daily PRN Constipation                            12.8   7.37  )-----------( 256      ( 28 Jan 2025 08:06 )             38.3     01-28    137  |  102  |  10  ----------------------------<  90  3.7   |  29  |  0.64    Ca    8.5      28 Jan 2025 08:06    TPro  7.0  /  Alb  2.2[L]  /  TBili  0.9  /  DBili  x   /  AST  35  /  ALT  28  /  AlkPhos  88  01-28    Urinalysis Basic - ( 28 Jan 2025 08:06 )    Color: x / Appearance: x / SG: x / pH: x  Gluc: 90 mg/dL / Ketone: x  / Bili: x / Urobili: x   Blood: x / Protein: x / Nitrite: x   Leuk Esterase: x / RBC: x / WBC x   Sq Epi: x / Non Sq Epi: x / Bacteria: x      Vital Signs Last 24 Hrs  T(C): 37.1 (29 Jan 2025 08:41), Max: 37.1 (29 Jan 2025 08:41)  T(F): 98.8 (29 Jan 2025 08:41), Max: 98.8 (29 Jan 2025 08:41)  HR: 90 (29 Jan 2025 08:41) (89 - 99)  BP: 137/86 (29 Jan 2025 08:41) (124/70 - 149/88)  BP(mean): --  RR: 15 (29 Jan 2025 08:41) (15 - 16)  SpO2: 92% (29 Jan 2025 08:41) (92% - 95%)    Parameters below as of 29 Jan 2025 08:41  Patient On (Oxygen Delivery Method): nasal cannula  O2 Flow (L/min): 2     Gen - drowsy/ frail appearing  HEENT - EOMI, MMM, PERRLA, Normal Conjunctivae  Neck - Supple  Pulm - wheezing, crackles, diminished   Cardiovascular - RRR  Abdomen - Soft, NT/ND, +BS  Extremities -  BLE trace edema  Neuro-     Cognitive - awake, alert, oriented to person, place and year. Follows commands well.     Communication - Fluent, +Hypophonic     Attention: Intact     Cranial Nerves -No new deficits     Motor -                     LEFT    UE - ShAB 5/5, EF 5/5, EE 3/5,  4/5                    RIGHT UE - ShAB 5/5, EF 5/5, EE 3/5,   4/5                    LEFT    LE - HF 5/5, KE 5/5, DF 5/5, PF 5/5                    RIGHT LE - HF 5/5, KE 5/5, DF 5/5, PF 5/5        Sensory - Intact  to LT  Psychiatric - Mood stable, Affect WNL  Skin:  Generalized ecchymosis/scabbing, facial blanchable redness, b/l elbows blanchable redness, R hand middle finger abrasion healed, moisture associated dermatitis groin, L hip healed abrasion, stage 1 sacral pressure injury      Continue comprehensive acute rehab program consisting of 3hrs/day of OT/PT and SLP.

## 2025-01-29 NOTE — PROGRESS NOTE ADULT - ASSESSMENT
ASSESSMENT/PLAN  89 year-old female with a PMH of HLD, paroxysmal Afib (not on AC at home, only on BB), SVT, rheumatoid arthritis, and Mycobacterium avium complex   with Gait Instability, ADL impairments and Functional impairments.      #Gait Instability, ADL impairments and Functional impairments  - Comprehensive Rehab Program of PT/OT/SLP    #CVA  -Continue statin  -on eliquis 2.5mg BID for Pafib  - MBS today  -Follow up with your PCP and Neurologist in 1 week from discharge from rehab    #Endo: HbA1C: 5.8   -Monitor fasting glucose levels on routine labs   -Follow up with PCP after dc from rehab    # Atrial fibrillation   -Continue AC with Eliquis   -Continue atenolol  -Follow up with cardiologist 1 week after dc from rehab    #SIRS at referring hospital (WBC 16 with tachycardia)-resolved   -S/o Rocephin IV  -infectious w/u did not ret reveal acute infection     #history of Mycobacterium avium complex  -CTA chest 1/24--> Bilateral pleural effusions and lower lobe passive atelectasis. Bilateral groundglass opacities within the upper lobes, lingula, and lower lobes and centrilobular nodules are of uncertain etilogy. The right lower lobe ill-defined 1.6 x 0.8 cm opacity.  -Repeat CT chest without contrast is recommended in 8- weeks to evaluate for resolution/change.  -prn albuterol inhaler   -o2 sats between 88-91% on RA on admission---> 2L NC, sats 82% RA in therapy.  - hospitalist to see ?nebs needed. CXR completed    #RA  -on Xeljanz at home, not on formulary at [facility], family will need to bring home supply     #Pain control  - Tylenol PRN    #GI/Bowel Mgmt   - Continue Senna at bedtime   - Miralax prn   - Protonix for GI ppx while hospitalized    #Bladder management  -no retention reported, continent    #BLE calf pain  - Duplex US-on Eliquis- US 1/28 by covering team-NEG    #Skin:  - stage 1 pressure ulcer scarum  -turn and position q2h   - Nystatin BID  - Lac hydrin BID     FEN   - Diet - DASH   - Dysphagia  SLP - evaluation and treatment- MBS 1/29    Precautions / PROPHYLAXIS:   - Falls, Cardiac, Seizure   - Lungs: Aspiration, Incentive Spirometer   - Pressure injury/Skin: Turn Q2hrs while in bed, OOB to Chair, PT/OT        Follow up with these providers:   General neurology at [facility], [facility],  1-2 weeks after discharge. Please instruct the patient to call [phone]  to schedule this appointment.    PCP Dr Rhodes

## 2025-01-29 NOTE — CONSULT NOTE ADULT - SUBJECTIVE AND OBJECTIVE BOX
Patient is a 89y old  Female who presents with a chief complaint of CVA (27 Jan 2025 15:28)        HPI:  Patient is a 89 year old female with a PMH of HLD, paroxysmal Afib (not on AC at home, only on BB), SVT, rheumatoid arthritis, and Mycobacterium avium complex admitted to [facility] for acute encephalopathy and subacute CVA on 1/16/25. She had been found on the floor with AMS by her niece, who called EMS.   On arrival to ED,  patient had EKG which showed sinus tachycardia with RBB. Initial non con CTH with acute Vs subacute infarcts in Right BG and age indeterminate infarct in Left occipital lobe. Pt admitted for further work up and management of encephalopathy. Neuro consulted for stroke W/U. She was outside the window for TNK or antithrombotic on arrival to ED.  Patient was treated with aspirin suppository in the ED and admitted to tele.  Patient was monitored on tele monitor and no acute arrythmia were noted.  MRB w/ acute to subacute infarcts, CTA H/N w/no LVO or HGS and TTE w/ no evident potential embolic source    Of note, patient also found to meet SIRS sepsis criteria and completed course of antibiotics. Work-up remained negative for acute infection.    Patient was seen by PT with recommendations for acute IPR. Patient was deemed medially stable on 1/27/25 and transferred to [facility] for acute IPR.        (27 Jan 2025 15:28)      PAST MEDICAL & SURGICAL HISTORY:  Atrial fibrillation      Rheumatoid arthritis      Paroxysmal atrial fibrillation      HTN (hypertension)      JACINTA (mycobacterium avium-intracellulare)      No significant past surgical history                Substance Use (street drugs): ( X ) never used  (  ) other:  Tobacco Usage:  ( X  ) never smoked   (   ) former smoker   (   ) current smoker  (     ) pack year  Alcohol Usage: denies      Allergies    Cardizem (Rash)    Intolerances        ammonium lactate 12% Lotion 1 Application(s) Topical two times a day  nystatin Powder 1 Application(s) Topical two times a day      REVIEW OF SYSTEMS:  Constitutional: No fever, No Chills, + fatigue  HEENT: No eye pain, +R vision retinal problem w/ intermittent blurred vision, double vision, and floaters, No difficulty hearing  Pulm: + cough,  denies shortness of breath  Cardio: No chest pain,  GI:  No abdominal pain, + nausea, + vomiting, No diarrhea, +constipation, +intermittent incontinence   : No dysuria, No frequency, No hematuria, +intermittent incontinence   Neuro: No headaches, No memory loss, +weakness, No numbness, No tremors  Skin: No itching, No rashes, No lesions   Endo: No temperature intolerance  MSK:  No Neck pain,  No back pain  Psych:  No depression, No anxiety    T(C): 36.7 (01-28-25 @ 08:35), Max: 36.7 (01-28-25 @ 08:35)  HR: 82 (01-28-25 @ 08:35) (66 - 94)  BP: 120/76 (01-28-25 @ 08:35) (104/66 - 137/89)  RR: 15 (01-28-25 @ 08:35) (15 - 18)  SpO2: 96% (01-28-25 @ 08:35) (93% - 97%)  Wt(kg): --Vital Signs Last 24 Hrs  T(C): 36.7 (28 Jan 2025 08:35), Max: 36.7 (28 Jan 2025 08:35)  T(F): 98 (28 Jan 2025 08:35), Max: 98 (28 Jan 2025 08:35)  HR: 82 (28 Jan 2025 08:35) (66 - 94)  BP: 120/76 (28 Jan 2025 08:35) (104/66 - 137/89)  BP(mean): 87 (27 Jan 2025 11:25) (79 - 87)  RR: 15 (28 Jan 2025 08:35) (15 - 18)  SpO2: 96% (28 Jan 2025 08:35) (93% - 97%)    Parameters below as of 28 Jan 2025 08:35  Patient On (Oxygen Delivery Method): room air        PHYSICAL EXAM:  GENERAL: NAD, well-groomed, well-developed  HEAD:  Atraumatic, Normocephalic  EYES: EOMI,  conjunctiva and sclera clear  ENMT: Moist mucous membranes  NECK: Supple, No JVD  NERVOUS SYSTEM:  Alert & Oriented   CHEST/LUNG: Clear to percussion bilaterally; No rales, rhonchi, wheezing  HEART: Regular rate and rhythm  ABDOMEN: Soft, Nontender, Nondistended; Bowel sounds present  EXTREMITIES:  No clubbing, cyanosis      LABS:                        12.8   7.37  )-----------( 256      ( 28 Jan 2025 08:06 )             38.3     01-28    137  |  102  |  10  ----------------------------<  90  3.7   |  29  |  0.64    Ca    8.5      28 Jan 2025 08:06    TPro  7.0  /  Alb  2.2[L]  /  TBili  0.9  /  DBili  x   /  AST  35  /  ALT  28  /  AlkPhos  88  01-28      Urinalysis Basic - ( 28 Jan 2025 08:06 )    Color: x / Appearance: x / SG: x / pH: x  Gluc: 90 mg/dL / Ketone: x  / Bili: x / Urobili: x   Blood: x / Protein: x / Nitrite: x   Leuk Esterase: x / RBC: x / WBC x   Sq Epi: x / Non Sq Epi: x / Bacteria: x       CAPILLARY BLOOD GLUCOSE            Urinalysis Basic - ( 28 Jan 2025 08:06 )    Color: x / Appearance: x / SG: x / pH: x  Gluc: 90 mg/dL / Ketone: x  / Bili: x / Urobili: x   Blood: x / Protein: x / Nitrite: x   Leuk Esterase: x / RBC: x / WBC x   Sq Epi: x / Non Sq Epi: x / Bacteria: x        RADIOLOGY & ADDITIONAL TESTS:    Consultant(s) Notes Reviewed:  [x ] YES  [ ] NO  Care Discussed with Consultants/Other Providers [ x] YES  [ ] NO  Imaging Personally Reviewed:  [ ] YES  [ ] NO
PULMONARY CONSULT  Location of Patient :  [facility] [address] ( [facility])  Attending requesting Consult:Susana Prather      Initial HPI on admission:  HPI:  89 year old female with a PMH of HLD, paroxysmal Afib (not on AC at home, only on BB), SVT, rheumatoid arthritis, and Mycobacterium avium complex admitted to [facility] for acute encephalopathy and subacute CVA on 1/16/25. She had been found on the floor with AMS by her niece, who called EMS.   On arrival to ED,  patient had EKG which showed sinus tachycardia with RBB. Initial non con CTH with acute Vs subacute infarcts in Right BG and age indeterminate infarct in Left occipital lobe. Pt admitted for further work up and management of encephalopathy. Neuro consulted for stroke W/U. She was outside the window for TNK or antithrombotic on arrival to ED.  Patient was treated with aspirin suppository in the ED and admitted to tele.  Patient was monitored on tele monitor and no acute arrythmia were noted.  MRB w/ acute to subacute infarcts, CTA H/N w/no LVO or HGS and TTE w/ no evident potential embolic source    Of note, patient also found to meet SIRS sepsis criteria and completed course of antibiotics. Work-up remained negative for acute infection.    Patient was seen by PT with recommendations for acute IPR. Patient was deemed medially stable on 1/27/25 and transferred to [facility] for acute IPR.    (27 Jan 2025 15:28)      BRIEF HOSPITAL COURSE:   pulmonary consult called for increasing sob and hypoxia   now on 2 L n/c sat 94% when seen  patient feels weak  no p, palp, n/v    PAST MEDICAL & SURGICAL HISTORY:  Atrial fibrillation  Rheumatoid arthritis  Paroxysmal atrial fibrillation  HTN (hypertension)  JACINTA (mycobacterium avium-intracellulare)  No significant past surgical history        Allergies    Cardizem (Rash)  Haider (Unknown)    Intolerances      FAMILY HISTORY:  No pertinent family history in first degree relatives      Social history: Social History:  SOCIAL HISTORY  Smoking - former  EtOH - Denied   Drugs - Denied    FUNCTIONAL HISTORY  Lives in a apartment on 5th floor with 0  and elevator access.   Prior Level of Function: Independent in ADLs and ambulation    CURRENT FUNCTIONAL STATUS  - Bed Mobility: min A   - Transfers: Min A   - Gait: 25ft with RW and min A   - ADLs: needs assist (27 Jan 2025 15:28)     Review of Systems: as stated above, poor historian       Medications:  MEDICATIONS  (STANDING):  ammonium lactate 12% Lotion 1 Application(s) Topical two times a day  apixaban 2.5 milliGRAM(s) Oral every 12 hours  atenolol  Tablet 50 milliGRAM(s) Oral daily  atorvastatin 40 milliGRAM(s) Oral at bedtime  buDESOnide    Inhalation Suspension 0.5 milliGRAM(s) Inhalation every 12 hours  escitalopram 20 milliGRAM(s) Oral daily  guaiFENesin  milliGRAM(s) Oral every 12 hours  melatonin 3 milliGRAM(s) Oral at bedtime  nystatin Powder 1 Application(s) Topical two times a day  pantoprazole    Tablet 40 milliGRAM(s) Oral before breakfast  senna 2 Tablet(s) Oral at bedtime    MEDICATIONS  (PRN):  acetaminophen     Tablet .. 650 milliGRAM(s) Oral every 6 hours PRN Mild Pain (1 - 3), Moderate Pain (4 - 6), Severe Pain (7 - 10)  albuterol    90 MICROgram(s) HFA Inhaler 2 Puff(s) Inhalation every 6 hours PRN Shortness of Breath and/or Wheezing  polyethylene glycol 3350 17 Gram(s) Oral daily PRN Constipation      Antibiotics History      Heme Medications   apixaban 2.5 milliGRAM(s) Oral every 12 hours, 01-27-25 @ 20:30      GI Medications  pantoprazole    Tablet 40 milliGRAM(s) Oral before breakfast, 01-27-25 @ 20:31, Routine  polyethylene glycol 3350 17 Gram(s) Oral daily, 01-27-25 @ 20:30, Routine PRN  senna 2 Tablet(s) Oral at bedtime, 01-27-25 @ 20:30, Routine        Home Medications:  Last Order Reconciliation Date: 01-27-25 @ 15:56 (Admission Reconciliation)  Albuterol (Eqv-ProAir HFA) 90 mcg/inh inhalation aerosol: 2 puff(s) inhaled every 6 hours as needed for  bronchospasm (01-23-25 @ 14:17)  apixaban 2.5 mg oral tablet: 1 tab(s) orally 2 times a day (01-23-25 @ 14:17)  atenolol 50 mg oral tablet: 1 tab(s) orally once a day (01-27-25 @ 14:51)  atorvastatin 40 mg oral tablet: 1 tab(s) orally once a day (01-23-25 @ 14:17)  Lexapro 20 mg oral tablet: 1 tab(s) orally once a day (01-23-25 @ 14:17)      LABS:                        12.8   7.37  )-----------( 256      ( 28 Jan 2025 08:06 )             38.3     01-28    137  |  102  |  10  ----------------------------<  90  3.7   |  29  |  0.64    Ca    8.5      28 Jan 2025 08:06    TPro  7.0  /  Alb  2.2[L]  /  TBili  0.9  /  DBili  x   /  AST  35  /  ALT  28  /  AlkPhos  88  01-28                 CULTURES: (if applicable)    Culture - Blood (collected 01-17-25 @ 00:30)  Source: .Blood BLOOD  Final Report (01-22-25 @ 06:00):    No growth at 5 days    Culture - Blood (collected 01-17-25 @ 00:20)  Source: .Blood BLOOD  Final Report (01-22-25 @ 06:00):    No growth at 5 days             RADIOLOGY  CXR:      CT:  < from: CT Chest No Cont (01.24.25 @ 18:17) >  ACC: [id number] EXAM:  CT CHEST   ORDERED BY: MELISA LOFTON     PROCEDURE DATE:  01/24/2025          INTERPRETATION:  CLINICAL INFORMATION:    COMPARISON: Chest radiograph 1/24/25 and 1/16/2025.    CONTRAST/COMPLICATIONS:  IV Contrast: NONE  Oral Contrast: NONE      FINDINGS:    Tubes/Lines: None.    Lungs, airways and pleura: Bilateral pleural effusions and lower lobe   passive atelectasis.    Secretions in the trachea, right main stem bronchus, bronchus intermedius   and right middle lobar bronchus. In addition, there are secretions within   the lower lobe segmental airways.    Bilateral bronchiectasis. Bilateral ground glass opacities and patchy   consolidation within the upper lobes, lingula, and lower lobes. In   addition, there are bilateral centrilobular nodules.    Within the right lower lobe is a 1.6 x 0.8 cm ill-defined opacity (series   4 image 58).    No pneumothorax.    Mediastinum: The visualized thyroid gland is unremarkable except for   bilobar thyroid calcifications. The esophagus is unremarkable.    The upper paratracheal, lower paratracheal, prevascular, and subcarinal   lymph nodes measure less than 15 mm in the short axis.    Cardiomegaly. Coronary artery calcifications. Aortic valve   calcifications. No pericardial effusion. Aorta is tortuous. Aortic   calcifications.    Upper Abdomen: Partially imaged 1.0 x 0.6 cm hypodense lesion along the   falciform ligament. Spondylosis of the thoracic spine. There is ankylosis   of the T8 and T9 vertebral bodies anteriorly.    Bones And Soft Tissues: The bones are unremarkable.  The soft tissues are   unremarkable.      IMPRESSION:    1.  The bilateral groundglass opacities in the opacities lesion, and   centrilobular nodules are of uncertain etiology.  2.  The right lower lobe ill-defined 1.6 x 0.8 cm opacity.  3.  A CT chest without contrast is recommended in 8- weeks to evaluate   for resolution/change.    --- End of Report -    < end of copied text >    ECHO:    < from: TTE W or WO Ultrasound Enhancing Agent (01.17.25 @ 13:23) >  CONCLUSIONS:      1. Left ventricular systolic function is normal with an ejection fraction visually estimated at 55 to 60 %.   2. There is normal LV mass and normal geometry.   3. The right ventricle is not well visualized. probably enlarged right ventricular cavity size and normal right ventricular systolic function.   4. Agitated saline injection was negative for intracardiac shunt.   5. Estimated pulmonary artery systolic pressure is 62 mmHg, consistent with severe pulmonary hypertension.   6. Moderate to severe tricuspid regurgitation.      < end of copied text >    VITALS:  T(C): 36.8 (01-29-25 @ 20:51), Max: 37.1 (01-29-25 @ 08:41)  T(F): 98.3 (01-29-25 @ 20:51), Max: 98.8 (01-29-25 @ 08:41)  HR: 89 (01-29-25 @ 20:51) (89 - 99)  BP: 150/73 (01-29-25 @ 20:51) (124/70 - 150/73)  BP(mean): --  ABP: --  ABP(mean): --  RR: 16 (01-29-25 @ 20:51) (15 - 16)  SpO2: 95% (01-29-25 @ 20:51) (92% - 95%)  CVP(mm Hg): --  CVP(cm H2O): --    Ins and Outs       Height (cm): 162.6 (01-27-25 @ 19:37)  Weight (kg): 57.2 (01-27-25 @ 19:37)  BMI (kg/m2): 21.6 (01-27-25 @ 19:37)        I&O's Detail      Physical Examination:  GENERAL:               Alert, Oriented, No acute distress.    HEENT:                     No JVD, dry MM  PULM:                     Bilateral air entry, dimminished to auscultation bilaterally, no significant sputum production, No Rales, No Rhonchi, No Wheezing  CVS:                         S1, S2,  No Murmur  ABD:                        Soft, nondistended, nontender, normoactive bowel sounds,   EXT:                         No edema, nontender, No Cyanosis or Clubbing   Vascular:                Cool Extremities,    NEURO:                  Alert, oriented, interactive,  follows commands  PSYC:                      Calm, + Insight. poor hisotrina

## 2025-01-29 NOTE — PROGRESS NOTE ADULT - SUBJECTIVE AND OBJECTIVE BOX
Patient is a 89 year old female with a PMH of HLD, paroxysmal Afib (not on AC at home, only on BB), SVT, rheumatoid arthritis, and Mycobacterium avium complex admitted to [facility] for acute encephalopathy and subacute CVA on 1/16/25. She had been found on the floor with AMS by her niece, who called EMS.   On arrival to ED,  patient had EKG which showed sinus tachycardia with RBB. Initial non con CTH with acute Vs subacute infarcts in Right BG and age indeterminate infarct in Left occipital lobe. Pt admitted for further work up and management of encephalopathy. Neuro consulted for stroke W/U. She was outside the window for TNK or antithrombotic on arrival to ED.  Patient was treated with aspirin suppository in the ED and admitted to tele.  Patient was monitored on tele monitor and no acute arrythmia were noted.  MRB w/ acute to subacute infarcts, CTA H/N w/no LVO or HGS and TTE w/ no evident potential embolic source  Of note, patient also found to meet SIRS sepsis criteria and completed course of antibiotics. Work-up remained negative for acute infection.    seen at the bedside , denies pain, n/v, sob, cp.  sats noted to be 85 to 86% on RA, improved to 92% with 2L NC.     Vital Signs Last 24 Hrs  T(C): 37.1 (29 Jan 2025 08:41), Max: 37.1 (29 Jan 2025 08:41)  T(F): 98.8 (29 Jan 2025 08:41), Max: 98.8 (29 Jan 2025 08:41)  HR: 90 (29 Jan 2025 08:41) (89 - 99)  BP: 137/86 (29 Jan 2025 08:41) (124/70 - 149/88)  BP(mean): --  RR: 15 (29 Jan 2025 08:41) (15 - 16)  SpO2: 92% (29 Jan 2025 08:41) (92% - 95%)    Parameters below as of 29 Jan 2025 08:41  Patient On (Oxygen Delivery Method): nasal cannula  O2 Flow (L/min): 2    GENERAL- NAD  EAR/NOSE/MOUTH/THROAT -  MMM  EYES- LESLY, conjunctiva and Sclera clear  NECK- supple  RESPIRATORY-  rales to auscultation bilaterally  CARDIOVASCULAR - SIS2, RRR  GI - soft NT BS present  EXTREMITIES- no pedal edema  NEUROLOGY- no gross focal deficits, confused  PSYCHIATRY- AAO X 1-2                12.8                 137  | 29   | 10           7.37  >-----------< 256     ------------------------< 90                    38.3                 3.7  | 102  | 0.64                                         Ca 8.5   Mg x     Ph x                Labs reviewed:     CXR personally reviewed: no acute changes form prior 1/29/25    Xray Chest 1 View-PORTABLE IMMEDIATE (Xray Chest 1 View-PORTABLE IMMEDIATE .) (01.24.25 @ 02:35) >    IMPRESSION: No acute change from prior follow-up with chest CT as   previously recommended    < from: CT Chest No Cont (01.24.25 @ 18:17) >    IMPRESSION:    1.  The bilateral groundglass opacities in the opacities lesion, and   centrilobular nodules are of uncertain etiology.  2.  The right lower lobe ill-defined 1.6 x 0.8 cm opacity.  3.  A CT chest without contrast is recommended in 8- weeks to evaluate   for resolution/change.      < from: US Duplex Venous Lower Ext Complete, Bilateral (01.28.25 @ 17:30) >  IMPRESSION:  No evidence of deep venous thrombosis in either lower extremity.    < from: MR Head No Cont (01.20.25 @ 14:17) >  There is abnormalT2 prolongation with restricted diffusion seen   involving the right basal ganglia/anterior coronal radiata region. This   is compatible with an acute infarct. No hemorrhagic transformation is   seen. No significant shift or herniation is seen.      < from: CT Angio Head w/ IV Cont (01.17.25 @ 01:28) >    IMPRESSION:    CT BRAIN: Stable region of hypodensity in the right caudate nucleus,   corona radiata and right lentiform nucleussuspicious for subacute   infarct. No acute hemorrhage.        MEDICATIONS  (STANDING):  ammonium lactate 12% Lotion 1 Application(s) Topical two times a day  apixaban 2.5 milliGRAM(s) Oral every 12 hours  atenolol  Tablet 50 milliGRAM(s) Oral daily  atorvastatin 40 milliGRAM(s) Oral at bedtime  buDESOnide    Inhalation Suspension 0.5 milliGRAM(s) Inhalation every 12 hours  escitalopram 20 milliGRAM(s) Oral daily  guaiFENesin  milliGRAM(s) Oral every 12 hours  melatonin 3 milliGRAM(s) Oral at bedtime  nystatin Powder 1 Application(s) Topical two times a day  pantoprazole    Tablet 40 milliGRAM(s) Oral before breakfast  senna 2 Tablet(s) Oral at bedtime    MEDICATIONS  (PRN):  acetaminophen     Tablet .. 650 milliGRAM(s) Oral every 6 hours PRN Mild Pain (1 - 3), Moderate Pain (4 - 6), Severe Pain (7 - 10)  albuterol    90 MICROgram(s) HFA Inhaler 2 Puff(s) Inhalation every 6 hours PRN Shortness of Breath and/or Wheezing  polyethylene glycol 3350 17 Gram(s) Oral daily PRN Constipation

## 2025-01-29 NOTE — PROGRESS NOTE ADULT - ASSESSMENT
89 year-old female with a PMH of HLD, paroxysmal Afib (not on AC at home, only on BB), SVT, rheumatoid arthritis, and Mycobacterium avium complex   with Gait Instability, ADL impairments and Functional impairments.    #CVA  -Continue statin, Eliquis   - PT/OT/SLP  -Follow up with your PCP and Neurologist in 1 week from discharge from rehab    #Pre-diabetes   -diet control  -Follow up with PCP after dc from rehab    # Atrial fibrillation   - Eliquis   - atenolol   -Follow up with cardiologist 1 week after dc from rehab    #met SIRS criteria at referring hospital (WBC 16 with tachycardia)-resolved   -S/p Rocephin IV  -infectious w/u did not reveal acute infection     #history of Mycobacterium avium complex  -sats noted to be 85 to 86% on RA, improved to 92% with 2L NC.   -CTA chest 1/24 - Bilateral pleural effusions and lower lobe passive atelectasis. Bilateral groundglass opacities within the upper lobes, lingula, and lower lobes and centrilobular nodules are of uncertain etilogy.     The right lower lobe ill-defined 1.6 x 0.8 cm opacity.  - Repeat CT chest without contrast is recommended in 8- weeks to evaluate for resolution/change.  - change Asmanex to budesonide nebulizer and albuterol   - prn albuterol   - supplement O2 PRN   - recommend  pulm consult     #RA  -on Xeljanz at home, not on formulary at [facility], family will need to bring home supply     # depression- Lexapro    #DVT  - Eliquis    will follow  d/w rehab team   time spent in e/m visit 50 min

## 2025-01-29 NOTE — CONSULT NOTE ADULT - ASSESSMENT
Assessment  1. Hypoxia unsure cause,   2. Abnormal CT chest - unsure if acute changes or chronic - h/o JACINTA  3.Afib on eliquis with recent stroke  4. Underlying H/o RA xeljanz , DVT      Plan  Check CXR, BNP, Trop, Pro morris  unsure if imaging findings new vs old  will try to obtain primary pulm MD to follow up  n/c o2 to maintain sat  more recs based on workup and discussion with primary pulm linda. 
89 year-old female with a PMH of HLD, paroxysmal Afib (not on AC at home, only on BB), SVT, rheumatoid arthritis, and Mycobacterium avium complex   with Gait Instability, ADL impairments and Functional impairments.        #CVA  -Continue statin  -Started on eliquis 2.5mg BID for Pafib given age and weight  -Comprehensive Rehab Program of PT/OT/SLP  -Follow up with your PCP and Neurologist in 1 week from discharge from rehab    #Pre-diabetes   -diet control  -Follow up with PCP after dc from rehab    # Atrial fibrillation   -Continue AC with Eliquis   -Continue atenolol HR control   -Follow up with cardiologist 1 week after dc from rehab    #SIRS at referring hospital (WBC 16 with tachycardia)-resolved   -S/o Rocephin IV  -infectious w/u did not ret reveal acute infection     #history of Mycobacterium avium complex  -CTA chest 1/24--> Bilateral pleural effusions and lower lobe passive atelectasis. Bilateral groundglass opacities within the upper lobes, lingula, and lower lobes and centrilobular nodules are of uncertain etilogy. The right lower lobe ill-defined 1.6 x 0.8 cm opacity.  -Repeat CT chest without contrast is recommended in 8- weeks to evaluate for resolution/change.  -Use mometasone in lieu of flovent while at rehab  -prn albuterol inhaler   -supplement O2 PRN     #RA  -on Xeljanz at home, not on formulary at [facility], family will need to bring home supply     #GI/Bowel Mgmt   - Continue Senna at bedtime   - Miralax prn   - Protonix for GI ppx       #DVT  - Eliquis  -TEDs

## 2025-01-30 LAB
ALBUMIN SERPL ELPH-MCNC: 2.3 G/DL — LOW (ref 3.3–5)
ALP SERPL-CCNC: 94 U/L — SIGNIFICANT CHANGE UP (ref 40–120)
ALT FLD-CCNC: 22 U/L — SIGNIFICANT CHANGE UP (ref 10–45)
ANION GAP SERPL CALC-SCNC: 5 MMOL/L — SIGNIFICANT CHANGE UP (ref 5–17)
AST SERPL-CCNC: 26 U/L — SIGNIFICANT CHANGE UP (ref 10–40)
BASOPHILS # BLD AUTO: 0.03 K/UL — SIGNIFICANT CHANGE UP (ref 0–0.2)
BASOPHILS NFR BLD AUTO: 0.4 % — SIGNIFICANT CHANGE UP (ref 0–2)
BILIRUB SERPL-MCNC: 1.1 MG/DL — SIGNIFICANT CHANGE UP (ref 0.2–1.2)
BUN SERPL-MCNC: 12 MG/DL — SIGNIFICANT CHANGE UP (ref 7–23)
CALCIUM SERPL-MCNC: 8.7 MG/DL — SIGNIFICANT CHANGE UP (ref 8.4–10.5)
CHLORIDE SERPL-SCNC: 101 MMOL/L — SIGNIFICANT CHANGE UP (ref 96–108)
CO2 SERPL-SCNC: 28 MMOL/L — SIGNIFICANT CHANGE UP (ref 22–31)
CREAT SERPL-MCNC: 0.5 MG/DL — SIGNIFICANT CHANGE UP (ref 0.5–1.3)
CRP SERPL-MCNC: 21 MG/L — HIGH
EGFR: 88 ML/MIN/1.73M2 — SIGNIFICANT CHANGE UP
EOSINOPHIL # BLD AUTO: 0.24 K/UL — SIGNIFICANT CHANGE UP (ref 0–0.5)
EOSINOPHIL NFR BLD AUTO: 3 % — SIGNIFICANT CHANGE UP (ref 0–6)
GLUCOSE SERPL-MCNC: 101 MG/DL — HIGH (ref 70–99)
HCT VFR BLD CALC: 38.5 % — SIGNIFICANT CHANGE UP (ref 34.5–45)
HGB BLD-MCNC: 13 G/DL — SIGNIFICANT CHANGE UP (ref 11.5–15.5)
IMM GRANULOCYTES NFR BLD AUTO: 0.4 % — SIGNIFICANT CHANGE UP (ref 0–0.9)
LYMPHOCYTES # BLD AUTO: 0.54 K/UL — LOW (ref 1–3.3)
LYMPHOCYTES # BLD AUTO: 6.8 % — LOW (ref 13–44)
MAGNESIUM SERPL-MCNC: 1.8 MG/DL — SIGNIFICANT CHANGE UP (ref 1.6–2.6)
MCHC RBC-ENTMCNC: 32.6 PG — SIGNIFICANT CHANGE UP (ref 27–34)
MCHC RBC-ENTMCNC: 33.8 G/DL — SIGNIFICANT CHANGE UP (ref 32–36)
MCV RBC AUTO: 96.5 FL — SIGNIFICANT CHANGE UP (ref 80–100)
MONOCYTES # BLD AUTO: 0.57 K/UL — SIGNIFICANT CHANGE UP (ref 0–0.9)
MONOCYTES NFR BLD AUTO: 7.1 % — SIGNIFICANT CHANGE UP (ref 2–14)
NEUTROPHILS # BLD AUTO: 6.57 K/UL — SIGNIFICANT CHANGE UP (ref 1.8–7.4)
NEUTROPHILS NFR BLD AUTO: 82.3 % — HIGH (ref 43–77)
NRBC # BLD: 0 /100 WBCS — SIGNIFICANT CHANGE UP (ref 0–0)
NRBC BLD-RTO: 0 /100 WBCS — SIGNIFICANT CHANGE UP (ref 0–0)
NT-PROBNP SERPL-SCNC: 2045 PG/ML — HIGH (ref 0–300)
PHOSPHATE SERPL-MCNC: 3.8 MG/DL — SIGNIFICANT CHANGE UP (ref 2.5–4.5)
PLATELET # BLD AUTO: 263 K/UL — SIGNIFICANT CHANGE UP (ref 150–400)
POTASSIUM SERPL-MCNC: 4 MMOL/L — SIGNIFICANT CHANGE UP (ref 3.5–5.3)
POTASSIUM SERPL-SCNC: 4 MMOL/L — SIGNIFICANT CHANGE UP (ref 3.5–5.3)
PROCALCITONIN SERPL-MCNC: 0.05 NG/ML — SIGNIFICANT CHANGE UP
PROT SERPL-MCNC: 7.5 G/DL — SIGNIFICANT CHANGE UP (ref 6–8.3)
RBC # BLD: 3.99 M/UL — SIGNIFICANT CHANGE UP (ref 3.8–5.2)
RBC # FLD: 14.1 % — SIGNIFICANT CHANGE UP (ref 10.3–14.5)
SODIUM SERPL-SCNC: 134 MMOL/L — LOW (ref 135–145)
TROPONIN I, HIGH SENSITIVITY RESULT: 13.4 NG/L — SIGNIFICANT CHANGE UP
TSH SERPL-MCNC: 3.19 UIU/ML — SIGNIFICANT CHANGE UP (ref 0.36–3.74)
WBC # BLD: 7.98 K/UL — SIGNIFICANT CHANGE UP (ref 3.8–10.5)
WBC # FLD AUTO: 7.98 K/UL — SIGNIFICANT CHANGE UP (ref 3.8–10.5)

## 2025-01-30 PROCEDURE — 71045 X-RAY EXAM CHEST 1 VIEW: CPT | Mod: 26

## 2025-01-30 PROCEDURE — 99232 SBSQ HOSP IP/OBS MODERATE 35: CPT

## 2025-01-30 PROCEDURE — 99232 SBSQ HOSP IP/OBS MODERATE 35: CPT | Mod: GC

## 2025-01-30 RX ADMIN — Medication 2.5 MILLIGRAM(S): at 08:40

## 2025-01-30 RX ADMIN — Medication 2 TABLET(S): at 21:35

## 2025-01-30 RX ADMIN — ACETAMINOPHEN, DIPHENHYDRAMINE HCL, PHENYLEPHRINE HCL 3 MILLIGRAM(S): 325; 25; 5 TABLET ORAL at 21:35

## 2025-01-30 RX ADMIN — Medication 50 MILLIGRAM(S): at 06:08

## 2025-01-30 RX ADMIN — PANTOPRAZOLE 40 MILLIGRAM(S): 20 TABLET, DELAYED RELEASE ORAL at 06:07

## 2025-01-30 RX ADMIN — ATORVASTATIN CALCIUM 40 MILLIGRAM(S): 80 TABLET, FILM COATED ORAL at 21:35

## 2025-01-30 RX ADMIN — Medication 600 MILLIGRAM(S): at 06:07

## 2025-01-30 RX ADMIN — BUDESONIDE 0.5 MILLIGRAM(S): 9 TABLET, EXTENDED RELEASE ORAL at 22:11

## 2025-01-30 RX ADMIN — Medication 2.5 MILLIGRAM(S): at 22:12

## 2025-01-30 RX ADMIN — Medication 1 APPLICATION(S): at 06:06

## 2025-01-30 RX ADMIN — APIXABAN 2.5 MILLIGRAM(S): 5 TABLET, FILM COATED ORAL at 06:08

## 2025-01-30 RX ADMIN — Medication 1 APPLICATION(S): at 17:25

## 2025-01-30 RX ADMIN — ESCITALOPRAM 20 MILLIGRAM(S): 10 TABLET, FILM COATED ORAL at 11:01

## 2025-01-30 RX ADMIN — NYSTATIN 1 APPLICATION(S): 100000 POWDER TOPICAL at 17:25

## 2025-01-30 RX ADMIN — Medication 600 MILLIGRAM(S): at 17:24

## 2025-01-30 RX ADMIN — APIXABAN 2.5 MILLIGRAM(S): 5 TABLET, FILM COATED ORAL at 17:24

## 2025-01-30 RX ADMIN — NYSTATIN 1 APPLICATION(S): 100000 POWDER TOPICAL at 06:07

## 2025-01-30 RX ADMIN — BUDESONIDE 0.5 MILLIGRAM(S): 9 TABLET, EXTENDED RELEASE ORAL at 08:39

## 2025-01-30 NOTE — PROGRESS NOTE ADULT - ASSESSMENT
Physical Examination:  GENERAL:               Alert, Oriented, No acute distress.    HEENT:                     No JVD, dry MM  PULM:                     Bilateral air entry, dimminished to auscultation bilaterally, no significant sputum production, No Rales, No Rhonchi, No Wheezing  CVS:                         S1, S2,  No Murmur  ABD:                        Soft, nondistended, nontender, normoactive bowel sounds,   EXT:                         No edema, nontender, No Cyanosis or Clubbing   Vascular:                Cool Extremities,    NEURO:                  Alert, oriented, interactive,  follows commands  PSYC:                      Calm, + Insight. poor hisotrina          Assessment  1. Hypoxia unsure cause, but improving today   2. Abnormal CT chest - unsure if acute changes or chronic - h/o JACINTA  3.Afib on eliquis with recent stroke  4. Underlying H/o RA xeljanz , DVT      Plan  Elevated BNP   will give trial of diuretics  d/w Dr. Junior today - primary pulm     no h/o nodule - will need out patient ct imaging after discharge    chronic bronchiectasis  CXR pending  currently on n/c o2    taper to off  monitor sat on room air rest/exertion daily

## 2025-01-30 NOTE — PROGRESS NOTE ADULT - ASSESSMENT
ASSESSMENT/PLAN  89 year-old female with a PMH of HLD, paroxysmal Afib (not on AC at home, only on BB), SVT, rheumatoid arthritis, and Mycobacterium avium complex   with Gait Instability, ADL impairments and Functional impairments.      #Gait Instability, ADL impairments and Functional impairments  - Comprehensive Rehab Program of PT/OT/SLP    #CVA  -Continue statin  -on eliquis 2.5mg BID for Pafib  - MBS today  -Follow up with your PCP and Neurologist in 1 week from discharge from rehab    #Endo: HbA1C: 5.8   -Monitor fasting glucose levels on routine labs   -Follow up with PCP after dc from rehab    # Atrial fibrillation   -Continue AC with Eliquis   -Continue atenolol  -Follow up with cardiologist 1 week after dc from rehab    #SIRS at referring hospital (WBC 16 with tachycardia)-resolved   -S/o Rocephin IV  -infectious w/u did not ret reveal acute infection     #history of Mycobacterium avium complex  -CTA chest 1/24--> Bilateral pleural effusions and lower lobe passive atelectasis. Bilateral groundglass opacities within the upper lobes, lingula, and lower lobes and centrilobular nodules are of uncertain etilogy. The right lower lobe ill-defined 1.6 x 0.8 cm opacity.  -Repeat CT chest without contrast is recommended in 8- weeks to evaluate for resolution/change.  -prn albuterol inhaler   -o2 sats between 88-91% on RA on admission---> 2L NC, sats 82% RA in therapy.  - hospitalist to see ?nebs needed. CXR completed    #RA  -on Xeljanz at home, not on formulary at [facility], family will need to bring home supply     #Pain control  - Tylenol PRN    #GI/Bowel Mgmt   - Continue Senna at bedtime   - Miralax prn   - Protonix for GI ppx while hospitalized    #Bladder management  -no retention reported, continent    #BLE calf pain  - Duplex US-on Eliquis- US 1/28 by covering team-NEG    #Skin:  - stage 1 pressure ulcer scarum  -turn and position q2h   - Nystatin BID  - Lac hydrin BID     FEN   - Diet - DASH   - Dysphagia  SLP - evaluation and treatment- MBS 1/29    Precautions / PROPHYLAXIS:   - Falls, Cardiac, Seizure   - Lungs: Aspiration, Incentive Spirometer   - Pressure injury/Skin: Turn Q2hrs while in bed, OOB to Chair, PT/OT        Follow up with these providers:   General neurology at [facility], [facility],  1-2 weeks after discharge. Please instruct the patient to call [phone]  to schedule this appointment.    PCP Dr Rhodes      89 year-old female with a PMH of HLD, paroxysmal Afib (not on AC at home, only on BB), SVT, rheumatoid arthritis, and Mycobacterium avium complex   with Gait Instability, ADL impairments and Functional impairments.      #Gait Instability, ADL impairments and Functional impairments  - Comprehensive Rehab Program of PT/OT/SLP    #CVA  -Continue statin  -on eliquis 2.5mg BID for Pafib  - MBS completed-diet adjusted  -Follow up with your PCP and Neurologist in 1 week from discharge from rehab    #Endo: HbA1C: 5.8   -Monitor fasting glucose levels on routine labs   -Follow up with PCP after dc from rehab    # Atrial fibrillation   -Continue AC with Eliquis   -Continue atenolol  -Follow up with cardiologist 1 week after dc from rehab    #SIRS at referring hospital (WBC 16 with tachycardia)-resolved   -S/o Rocephin IV  -infectious w/u did not ret reveal acute infection     #history of Mycobacterium avium complex  -CTA chest 1/24--> Bilateral pleural effusions and lower lobe passive atelectasis. Bilateral groundglass opacities within the upper lobes, lingula, and lower lobes and centrilobular nodules are of uncertain etilogy. The right lower lobe ill-defined 1.6 x 0.8 cm opacity.  -Repeat CT chest without contrast is recommended in 8- weeks to evaluate for resolution/change.  -prn albuterol inhaler   -o2 sats between 88-91% on RA on admission---> 2L NC, sats 82% RA in therapy.  -pulm in    #RA  -on Xeljanz at home, not on formulary at [facility], family will need to bring home supply     #Pain control  - Tylenol PRN    #GI/Bowel Mgmt   - Continue Senna at bedtime   - Miralax prn   - Protonix for GI ppx while hospitalized    #Bladder management  -no retention reported, continent    #BLE calf pain  - Duplex US-on Eliquis- US 1/28 by covering team-NEG    #Skin:  - stage 1 pressure ulcer scarum  -turn and position q2h   - Nystatin BID  - Lac hydrin BID     FEN   - Diet - DASH   - Dysphagia  SLP - evaluation and treatment- MBS 1/29    Precautions / PROPHYLAXIS:   - Falls, Cardiac, Seizure   - Lungs: Aspiration, Incentive Spirometer   - Pressure injury/Skin: Turn Q2hrs while in bed, OOB to Chair, PT/OT        Follow up with these providers:   General neurology at [facility], [facility],  1-2 weeks after discharge. Please instruct the patient to call [phone]  to schedule this appointment.    PCP Dr Rhodes

## 2025-01-30 NOTE — PROGRESS NOTE ADULT - SUBJECTIVE AND OBJECTIVE BOX
HPI:  Patient is a 89 year old female with a PMH of HLD, paroxysmal Afib (not on AC at home, only on BB), SVT, rheumatoid arthritis, and Mycobacterium avium complex admitted to [facility] for acute encephalopathy and subacute CVA on 1/16/25. She had been found on the floor with AMS by her niece, who called EMS.   On arrival to ED,  patient had EKG which showed sinus tachycardia with RBB. Initial non con CTH with acute Vs subacute infarcts in Right BG and age indeterminate infarct in Left occipital lobe. Pt admitted for further work up and management of encephalopathy. Neuro consulted for stroke W/U. She was outside the window for TNK or antithrombotic on arrival to ED.  Patient was treated with aspirin suppository in the ED and admitted to tele.  Patient was monitored on tele monitor and no acute arrythmia were noted.  MRB w/ acute to subacute infarcts, CTA H/N w/no LVO or HGS and TTE w/ no evident potential embolic source    Of note, patient also found to meet SIRS sepsis criteria and completed course of antibiotics. Work-up remained negative for acute infection.    Patient was seen by PT with recommendations for acute IPR. Patient was deemed medially stable on 1/27/25 and transferred to [facility] for acute IPR.     NAD in bed, drowsy, easily awaken to voice, alert to self and place, follows commands.  Wheezing this am-albuterol given, CXR reading pending, afebrile. Hospitalist to see. ?nebs needed.  O2 sats 80s RA. NC in place. Pulmonary consult requested.  No HA, no chest pain, no palpitations.  Tolerating Eliquis-no overt bleeding.  Will add Melatonin at bedtime-impaired sleep reported.  s/p MBS today      MEDICATIONS  (STANDING):  albuterol    0.083% 2.5 milliGRAM(s) Nebulizer every 6 hours  ammonium lactate 12% Lotion 1 Application(s) Topical two times a day  apixaban 2.5 milliGRAM(s) Oral every 12 hours  atenolol  Tablet 50 milliGRAM(s) Oral daily  atorvastatin 40 milliGRAM(s) Oral at bedtime  buDESOnide    Inhalation Suspension 0.5 milliGRAM(s) Inhalation every 12 hours  escitalopram 20 milliGRAM(s) Oral daily  guaiFENesin  milliGRAM(s) Oral every 12 hours  melatonin 3 milliGRAM(s) Oral at bedtime  nystatin Powder 1 Application(s) Topical two times a day  pantoprazole    Tablet 40 milliGRAM(s) Oral before breakfast  senna 2 Tablet(s) Oral at bedtime    MEDICATIONS  (PRN):  acetaminophen     Tablet .. 650 milliGRAM(s) Oral every 6 hours PRN Mild Pain (1 - 3), Moderate Pain (4 - 6), Severe Pain (7 - 10)  albuterol    90 MICROgram(s) HFA Inhaler 2 Puff(s) Inhalation every 6 hours PRN Shortness of Breath and/or Wheezing  polyethylene glycol 3350 17 Gram(s) Oral daily PRN Constipation                            12.8   7.37  )-----------( 256      ( 28 Jan 2025 08:06 )             38.3     01-28    137  |  102  |  10  ----------------------------<  90  3.7   |  29  |  0.64    Ca    8.5      28 Jan 2025 08:06    TPro  7.0  /  Alb  2.2[L]  /  TBili  0.9  /  DBili  x   /  AST  35  /  ALT  28  /  AlkPhos  88  01-28    Urinalysis Basic - ( 28 Jan 2025 08:06 )    Color: x / Appearance: x / SG: x / pH: x  Gluc: 90 mg/dL / Ketone: x  / Bili: x / Urobili: x   Blood: x / Protein: x / Nitrite: x   Leuk Esterase: x / RBC: x / WBC x   Sq Epi: x / Non Sq Epi: x / Bacteria: x        Vital Signs Last 24 Hrs  T(C): 36.8 (29 Jan 2025 20:51), Max: 37.1 (29 Jan 2025 08:41)  T(F): 98.3 (29 Jan 2025 20:51), Max: 98.8 (29 Jan 2025 08:41)  HR: 84 (30 Jan 2025 06:04) (84 - 98)  BP: 148/76 (30 Jan 2025 06:04) (137/86 - 150/73)  BP(mean): --  RR: 16 (29 Jan 2025 20:51) (15 - 16)  SpO2: 95% (29 Jan 2025 20:51) (92% - 95%)    Parameters below as of 29 Jan 2025 20:51  Patient On (Oxygen Delivery Method): nasal cannula       Gen - drowsy/ frail appearing  HEENT - EOMI, MMM, PERRLA, Normal Conjunctivae  Neck - Supple  Pulm - wheezing, crackles, diminished   Cardiovascular - RRR  Abdomen - Soft, NT/ND, +BS  Extremities -  BLE trace edema  Neuro-     Cognitive - awake, alert, oriented to person, place and year. Follows commands well.     Communication - Fluent, +Hypophonic     Attention: Intact     Cranial Nerves -No new deficits     Motor -                     LEFT    UE - ShAB 5/5, EF 5/5, EE 3/5,  4/5                    RIGHT UE - ShAB 5/5, EF 5/5, EE 3/5,   4/5                    LEFT    LE - HF 5/5, KE 5/5, DF 5/5, PF 5/5                    RIGHT LE - HF 5/5, KE 5/5, DF 5/5, PF 5/5        Sensory - Intact  to LT  Psychiatric - Mood stable, Affect WNL  Skin:  Generalized ecchymosis/scabbing, facial blanchable redness, b/l elbows blanchable redness, R hand middle finger abrasion healed, moisture associated dermatitis groin, L hip healed abrasion, stage 1 sacral pressure injury        Continue comprehensive acute rehab program consisting of 3hrs/day of OT/PT and SLP. HPI:  Patient is a 89 year old female with a PMH of HLD, paroxysmal Afib (not on AC at home, only on BB), SVT, rheumatoid arthritis, and Mycobacterium avium complex admitted to [facility] for acute encephalopathy and subacute CVA on 1/16/25. She had been found on the floor with AMS by her niece, who called EMS.   On arrival to ED,  patient had EKG which showed sinus tachycardia with RBB. Initial non con CTH with acute Vs subacute infarcts in Right BG and age indeterminate infarct in Left occipital lobe. Pt admitted for further work up and management of encephalopathy. Neuro consulted for stroke W/U. She was outside the window for TNK or antithrombotic on arrival to ED.  Patient was treated with aspirin suppository in the ED and admitted to tele.  Patient was monitored on tele monitor and no acute arrythmia were noted.  MRB w/ acute to subacute infarcts, CTA H/N w/no LVO or HGS and TTE w/ no evident potential embolic source    Of note, patient also found to meet SIRS sepsis criteria and completed course of antibiotics. Work-up remained negative for acute infection.    Patient was seen by PT with recommendations for acute IPR. Patient was deemed medially stable on 1/27/25 and transferred to [facility] for acute IPR.     NAD in bed, drowsy, easily awaken to voice, alert to self and place, follows commands.  Wheezing less- pulmonary consulted, respi tx continues  O2 sats 80s RA. NC in place.   No HA, no chest pain, no palpitations.  Tolerating Eliquis-no overt bleeding.  Will add Melatonin at bedtime-impaired sleep reported.  s/p MBS      MEDICATIONS  (STANDING):  albuterol    0.083% 2.5 milliGRAM(s) Nebulizer every 6 hours  ammonium lactate 12% Lotion 1 Application(s) Topical two times a day  apixaban 2.5 milliGRAM(s) Oral every 12 hours  atenolol  Tablet 50 milliGRAM(s) Oral daily  atorvastatin 40 milliGRAM(s) Oral at bedtime  buDESOnide    Inhalation Suspension 0.5 milliGRAM(s) Inhalation every 12 hours  escitalopram 20 milliGRAM(s) Oral daily  guaiFENesin  milliGRAM(s) Oral every 12 hours  melatonin 3 milliGRAM(s) Oral at bedtime  nystatin Powder 1 Application(s) Topical two times a day  pantoprazole    Tablet 40 milliGRAM(s) Oral before breakfast  senna 2 Tablet(s) Oral at bedtime    MEDICATIONS  (PRN):  acetaminophen     Tablet .. 650 milliGRAM(s) Oral every 6 hours PRN Mild Pain (1 - 3), Moderate Pain (4 - 6), Severe Pain (7 - 10)  albuterol    90 MICROgram(s) HFA Inhaler 2 Puff(s) Inhalation every 6 hours PRN Shortness of Breath and/or Wheezing  polyethylene glycol 3350 17 Gram(s) Oral daily PRN Constipation                            12.8   7.37  )-----------( 256      ( 28 Jan 2025 08:06 )             38.3     01-28    137  |  102  |  10  ----------------------------<  90  3.7   |  29  |  0.64    Ca    8.5      28 Jan 2025 08:06    TPro  7.0  /  Alb  2.2[L]  /  TBili  0.9  /  DBili  x   /  AST  35  /  ALT  28  /  AlkPhos  88  01-28    Urinalysis Basic - ( 28 Jan 2025 08:06 )    Color: x / Appearance: x / SG: x / pH: x  Gluc: 90 mg/dL / Ketone: x  / Bili: x / Urobili: x   Blood: x / Protein: x / Nitrite: x   Leuk Esterase: x / RBC: x / WBC x   Sq Epi: x / Non Sq Epi: x / Bacteria: x        Vital Signs Last 24 Hrs  T(C): 36.8 (29 Jan 2025 20:51), Max: 37.1 (29 Jan 2025 08:41)  T(F): 98.3 (29 Jan 2025 20:51), Max: 98.8 (29 Jan 2025 08:41)  HR: 84 (30 Jan 2025 06:04) (84 - 98)  BP: 148/76 (30 Jan 2025 06:04) (137/86 - 150/73)  BP(mean): --  RR: 16 (29 Jan 2025 20:51) (15 - 16)  SpO2: 95% (29 Jan 2025 20:51) (92% - 95%)    Parameters below as of 29 Jan 2025 20:51  Patient On (Oxygen Delivery Method): nasal cannula       Gen - drowsy/ frail appearing  HEENT - EOMI, MMM, PERRLA, Normal Conjunctivae  Neck - Supple  Pulm - wheezing, crackles, diminished   Cardiovascular - RRR  Abdomen - Soft, NT/ND, +BS  Extremities -  BLE trace edema  Neuro-     Cognitive - awake, alert, oriented to person, place and year. Follows commands well.     Communication - Fluent, +Hypophonic     Attention: Intact     Cranial Nerves -No new deficits     Motor -                     LEFT    UE - ShAB 5/5, EF 5/5, EE 3/5,  4/5                    RIGHT UE - ShAB 5/5, EF 5/5, EE 3/5,   4/5                    LEFT    LE - HF 5/5, KE 5/5, DF 5/5, PF 5/5                    RIGHT LE - HF 5/5, KE 5/5, DF 5/5, PF 5/5        Sensory - Intact  to LT  Psychiatric - Mood stable, Affect WNL  Skin:  Generalized ecchymosis/scabbing, facial blanchable redness, b/l elbows blanchable redness, R hand middle finger abrasion healed, moisture associated dermatitis groin, L hip healed abrasion, stage 1 sacral pressure injury        Continue comprehensive acute rehab program consisting of 3hrs/day of OT/PT and SLP.

## 2025-01-30 NOTE — PROGRESS NOTE ADULT - SUBJECTIVE AND OBJECTIVE BOX
Patient is a 89 year old female with a PMH of HLD, paroxysmal Afib (not on AC at home, only on BB), SVT, rheumatoid arthritis, and Mycobacterium avium complex admitted with acute to subacute CVA.    Patient seen and examined. No acute evnts. No new complaints.       MEDICATIONS  (STANDING):  albuterol    0.083% 2.5 milliGRAM(s) Nebulizer every 6 hours  ammonium lactate 12% Lotion 1 Application(s) Topical two times a day  apixaban 2.5 milliGRAM(s) Oral every 12 hours  atenolol  Tablet 50 milliGRAM(s) Oral daily  atorvastatin 40 milliGRAM(s) Oral at bedtime  buDESOnide    Inhalation Suspension 0.5 milliGRAM(s) Inhalation every 12 hours  escitalopram 20 milliGRAM(s) Oral daily  guaiFENesin  milliGRAM(s) Oral every 12 hours  melatonin 3 milliGRAM(s) Oral at bedtime  nystatin Powder 1 Application(s) Topical two times a day  pantoprazole    Tablet 40 milliGRAM(s) Oral before breakfast  senna 2 Tablet(s) Oral at bedtime    MEDICATIONS  (PRN):  acetaminophen     Tablet .. 650 milliGRAM(s) Oral every 6 hours PRN Mild Pain (1 - 3), Moderate Pain (4 - 6), Severe Pain (7 - 10)  albuterol    90 MICROgram(s) HFA Inhaler 2 Puff(s) Inhalation every 6 hours PRN Shortness of Breath and/or Wheezing  polyethylene glycol 3350 17 Gram(s) Oral daily PRN Constipation    T(C): 36.8 (01-30-25 @ 07:58), Max: 36.8 (01-29-25 @ 20:51)  T(F): 98.3 (01-30-25 @ 07:58), Max: 98.3 (01-29-25 @ 20:51)  HR: 69 (01-30-25 @ 07:58) (69 - 89)  BP: 123/70 (01-30-25 @ 07:58) (123/70 - 150/73)  ABP: --  ABP(mean): --  RR: 16 (01-30-25 @ 07:58) (16 - 16)  SpO2: 96% (01-30-25 @ 07:58) (95% - 96%)    GENERAL- NAD, on Nasal Cannula  EAR/NOSE/MOUTH/THROAT -  MMM  EYES- LESLY, conjunctiva and Sclera clear  NECK- supple  RESPIRATORY-  rales to auscultation bilaterally  CARDIOVASCULAR - SIS2, RRR  GI - soft NT BS present  EXTREMITIES- no pedal edema  NEUROLOGY- no gross focal deficits, confused  PSYCHIATRY- AAO X 1-2      MEDICATIONS  (STANDING):  albuterol    0.083% 2.5 milliGRAM(s) Nebulizer every 6 hours  ammonium lactate 12% Lotion 1 Application(s) Topical two times a day  apixaban 2.5 milliGRAM(s) Oral every 12 hours  atenolol  Tablet 50 milliGRAM(s) Oral daily  atorvastatin 40 milliGRAM(s) Oral at bedtime  buDESOnide    Inhalation Suspension 0.5 milliGRAM(s) Inhalation every 12 hours  escitalopram 20 milliGRAM(s) Oral daily  guaiFENesin  milliGRAM(s) Oral every 12 hours  melatonin 3 milliGRAM(s) Oral at bedtime  nystatin Powder 1 Application(s) Topical two times a day  pantoprazole    Tablet 40 milliGRAM(s) Oral before breakfast  senna 2 Tablet(s) Oral at bedtime    MEDICATIONS  (PRN):  acetaminophen     Tablet .. 650 milliGRAM(s) Oral every 6 hours PRN Mild Pain (1 - 3), Moderate Pain (4 - 6), Severe Pain (7 - 10)  albuterol    90 MICROgram(s) HFA Inhaler 2 Puff(s) Inhalation every 6 hours PRN Shortness of Breath and/or Wheezing  polyethylene glycol 3350 17 Gram(s) Oral daily PRN Constipation

## 2025-01-30 NOTE — PROGRESS NOTE ADULT - ASSESSMENT
89 year-old female with a PMH of HLD, paroxysmal Afib (not on AC at home, only on BB), SVT, rheumatoid arthritis, and Mycobacterium avium complex   with Gait Instability, ADL impairments and Functional impairments.    #CVA  -Continue statin, Eliquis   - PT/OT/SLP  -Follow up with your PCP and Neurologist in 1 week from discharge from rehab    #Pre-diabetes   -diet control  -Follow up with PCP after dc from rehab    # Atrial fibrillation   - Eliquis   - atenolol   -Follow up with cardiologist 1 week after dc from rehab    #met SIRS criteria at referring hospital (WBC 16 with tachycardia)-resolved   -S/p Rocephin IV  -infectious w/u did not reveal acute infection     #history of Mycobacterium avium complex  -sats noted to be 85 to 86% on RA, improved to 92% with 2L NC.   -CTA chest 1/24 - Bilateral pleural effusions and lower lobe passive atelectasis. Bilateral groundglass opacities within the upper lobes, lingula, and lower lobes and centrilobular nodules are of uncertain etilogy.     The right lower lobe ill-defined 1.6 x 0.8 cm opacity.  - Repeat CT chest without contrast is recommended in 8- weeks to evaluate for resolution/change.  - change Asmanex to budesonide nebulizer and albuterol   - prn albuterol   - supplement O2 PRN   -seen by pulmo, recommendations reviewed.    #RA  -on Xeljanz at home, not on formulary at [facility], family will need to bring home supply     # depression- Lexapro    #DVT  - Eliquis    will follow  d/w rehab team

## 2025-01-30 NOTE — PROGRESS NOTE ADULT - SUBJECTIVE AND OBJECTIVE BOX
Follow-up Pulmonary Progress Note  Chief Complaint : Cerebral infarction        patient seen and examined  comfortable  on 2 L sat 98% today  feeling better        Allergies :Cardizem (Rash)  Haider (Unknown)      PAST MEDICAL & SURGICAL HISTORY:  Atrial fibrillation    Rheumatoid arthritis    Paroxysmal atrial fibrillation    HTN (hypertension)    JACINTA (mycobacterium avium-intracellulare)    No significant past surgical history        Medications:  MEDICATIONS  (STANDING):  albuterol    0.083% 2.5 milliGRAM(s) Nebulizer every 6 hours  ammonium lactate 12% Lotion 1 Application(s) Topical two times a day  apixaban 2.5 milliGRAM(s) Oral every 12 hours  atenolol  Tablet 50 milliGRAM(s) Oral daily  atorvastatin 40 milliGRAM(s) Oral at bedtime  buDESOnide    Inhalation Suspension 0.5 milliGRAM(s) Inhalation every 12 hours  escitalopram 20 milliGRAM(s) Oral daily  guaiFENesin  milliGRAM(s) Oral every 12 hours  melatonin 3 milliGRAM(s) Oral at bedtime  nystatin Powder 1 Application(s) Topical two times a day  pantoprazole    Tablet 40 milliGRAM(s) Oral before breakfast  senna 2 Tablet(s) Oral at bedtime    MEDICATIONS  (PRN):  acetaminophen     Tablet .. 650 milliGRAM(s) Oral every 6 hours PRN Mild Pain (1 - 3), Moderate Pain (4 - 6), Severe Pain (7 - 10)  albuterol    90 MICROgram(s) HFA Inhaler 2 Puff(s) Inhalation every 6 hours PRN Shortness of Breath and/or Wheezing  polyethylene glycol 3350 17 Gram(s) Oral daily PRN Constipation      Antibiotics History      Heme Medications   apixaban 2.5 milliGRAM(s) Oral every 12 hours, 01-27-25 @ 20:30      GI Medications  pantoprazole    Tablet 40 milliGRAM(s) Oral before breakfast, 01-27-25 @ 20:31, Routine  polyethylene glycol 3350 17 Gram(s) Oral daily, 01-27-25 @ 20:30, Routine PRN  senna 2 Tablet(s) Oral at bedtime, 01-27-25 @ 20:30, Routine        LABS:                        13.0   7.98  )-----------( 263      ( 30 Jan 2025 06:43 )             38.5     01-30    134[L]  |  101  |  12  ----------------------------<  101[H]  4.0   |  28  |  0.50    Ca    8.7      30 Jan 2025 06:43  Phos  3.8     01-30  Mg     1.8     01-30    TPro  7.5  /  Alb  2.3[L]  /  TBili  1.1  /  DBili  x   /  AST  26  /  ALT  22  /  AlkPhos  94  01-30       Trend BNP  01-30-25 @ 06:43   -  2045[H]    Procalcitonin Trend  01-30-25 @ 06:43   -   0.05      CULTURES: (if applicable)    Culture - Blood (collected 01-17-25 @ 00:30)  Source: .Blood BLOOD  Final Report (01-22-25 @ 06:00):    No growth at 5 days    Culture - Blood (collected 01-17-25 @ 00:20)  Source: .Blood BLOOD  Final Report (01-22-25 @ 06:00):    No growth at 5 days         RADIOLOGY  CXR:  cxr pending      VITALS:  T(C): 36.8 (01-30-25 @ 07:58), Max: 36.8 (01-29-25 @ 20:51)  T(F): 98.3 (01-30-25 @ 07:58), Max: 98.3 (01-29-25 @ 20:51)  HR: 69 (01-30-25 @ 07:58) (69 - 89)  BP: 123/70 (01-30-25 @ 07:58) (123/70 - 150/73)  BP(mean): --  ABP: --  ABP(mean): --  RR: 16 (01-30-25 @ 07:58) (16 - 16)  SpO2: 96% (01-30-25 @ 07:58) (95% - 96%)  CVP(mm Hg): --  CVP(cm H2O): --    Ins and Outs       Height (cm): 162.6 (01-27-25 @ 19:37)  Weight (kg): 57.2 (01-27-25 @ 19:37)  BMI (kg/m2): 21.6 (01-27-25 @ 19:37)        I&O's Detail

## 2025-01-31 PROCEDURE — 99232 SBSQ HOSP IP/OBS MODERATE 35: CPT | Mod: GC

## 2025-01-31 RX ORDER — ATENOLOL 50 MG
25 TABLET ORAL DAILY
Refills: 0 | Status: DISCONTINUED | OUTPATIENT
Start: 2025-02-01 | End: 2025-02-06

## 2025-01-31 RX ADMIN — NYSTATIN 1 APPLICATION(S): 100000 POWDER TOPICAL at 17:36

## 2025-01-31 RX ADMIN — ACETAMINOPHEN, DIPHENHYDRAMINE HCL, PHENYLEPHRINE HCL 3 MILLIGRAM(S): 325; 25; 5 TABLET ORAL at 21:41

## 2025-01-31 RX ADMIN — BUDESONIDE 0.5 MILLIGRAM(S): 9 TABLET, EXTENDED RELEASE ORAL at 22:29

## 2025-01-31 RX ADMIN — Medication 1 APPLICATION(S): at 05:56

## 2025-01-31 RX ADMIN — Medication 600 MILLIGRAM(S): at 05:56

## 2025-01-31 RX ADMIN — Medication 2 TABLET(S): at 21:41

## 2025-01-31 RX ADMIN — ESCITALOPRAM 20 MILLIGRAM(S): 10 TABLET, FILM COATED ORAL at 12:36

## 2025-01-31 RX ADMIN — ATORVASTATIN CALCIUM 40 MILLIGRAM(S): 80 TABLET, FILM COATED ORAL at 21:41

## 2025-01-31 RX ADMIN — Medication 1 APPLICATION(S): at 17:36

## 2025-01-31 RX ADMIN — Medication 600 MILLIGRAM(S): at 17:36

## 2025-01-31 RX ADMIN — Medication 2.5 MILLIGRAM(S): at 16:19

## 2025-01-31 RX ADMIN — APIXABAN 2.5 MILLIGRAM(S): 5 TABLET, FILM COATED ORAL at 05:56

## 2025-01-31 RX ADMIN — Medication 2.5 MILLIGRAM(S): at 22:29

## 2025-01-31 RX ADMIN — PANTOPRAZOLE 40 MILLIGRAM(S): 20 TABLET, DELAYED RELEASE ORAL at 05:56

## 2025-01-31 RX ADMIN — APIXABAN 2.5 MILLIGRAM(S): 5 TABLET, FILM COATED ORAL at 17:36

## 2025-01-31 RX ADMIN — NYSTATIN 1 APPLICATION(S): 100000 POWDER TOPICAL at 05:56

## 2025-01-31 RX ADMIN — Medication 50 MILLIGRAM(S): at 05:56

## 2025-01-31 NOTE — PROGRESS NOTE ADULT - ASSESSMENT
89 year-old female with a PMH of HLD, paroxysmal Afib (not on AC at home, only on BB), SVT, rheumatoid arthritis, and Mycobacterium avium complex   with Gait Instability, ADL impairments and Functional impairments.      #Gait Instability, ADL impairments and Functional impairments  - Comprehensive Rehab Program of PT/OT/SLP    #CVA  -Continue statin  -on eliquis 2.5mg BID for Pafib  - MBS completed-diet adjusted  -Follow up with your PCP and Neurologist in 1 week from discharge from rehab    #Endo: HbA1C: 5.8   -Monitor fasting glucose levels on routine labs   -Follow up with PCP after dc from rehab    # Atrial fibrillation   -Continue AC with Eliquis   -on atenolol- hospitalist to see  -Follow up with cardiologist    #SIRS at referring hospital (WBC 16 with tachycardia)-resolved   -S/o Rocephin IV  -infectious w/u did not ret reveal acute infection     #history of Mycobacterium avium complex  -CTA chest 1/24--> Bilateral pleural effusions and lower lobe passive atelectasis. Bilateral groundglass opacities within the upper lobes, lingula, and lower lobes and centrilobular nodules are of uncertain etilogy. The right lower lobe ill-defined 1.6 x 0.8 cm opacity.  -Repeat CT chest without contrast is recommended in 8- weeks to evaluate for resolution/change.  -prn albuterol inhaler   -o2 sats between 85-91% on RA.  -pulm in, BNP up    #RA  -on Xeljanz at home, not on formulary at [facility], family will need to bring home supply     #Pain control  - Tylenol PRN    #GI/Bowel Mgmt   - Continue Senna at bedtime   - Miralax prn   - Protonix for GI ppx while hospitalized    #Bladder management  -no retention reported, continent  - toileting schedule    #BLE calf pain  - Duplex US-on Eliquis- US 1/28 by covering team-NEG    #Skin:  - stage 1 pressure ulcer scarum  - turn and position q2h   - Nystatin BID    FEN   - Diet - DASH   - Dysphagia  SLP - evaluation and treatment- [facility] 1/29    Precautions / PROPHYLAXIS:   - Falls, Cardiac, Seizure   - Lungs: Aspiration, Incentive Spirometer   - Pressure injury/Skin: Turn Q2hrs while in bed, OOB to Chair, PT/OT        Follow up with these providers:   General neurology at [address] [place] [address],  1-2 weeks after discharge. Please instruct the patient to call [phone]  to schedule this appointment.    PCP Dr Rhodes

## 2025-01-31 NOTE — PROGRESS NOTE ADULT - SUBJECTIVE AND OBJECTIVE BOX
HPI:  Patient is a 89 year old female with a PMH of HLD, paroxysmal Afib (not on AC at home, only on BB), SVT, rheumatoid arthritis, and Mycobacterium avium complex admitted to [facility] for acute encephalopathy and subacute CVA on 1/16/25. She had been found on the floor with AMS by her niece, who called EMS.   On arrival to ED,  patient had EKG which showed sinus tachycardia with RBB. Initial non con CTH with acute Vs subacute infarcts in Right BG and age indeterminate infarct in Left occipital lobe. Pt admitted for further work up and management of encephalopathy. Neuro consulted for stroke W/U. She was outside the window for TNK or antithrombotic on arrival to ED.  Patient was treated with aspirin suppository in the ED and admitted to tele.  Patient was monitored on tele monitor and no acute arrythmia were noted.  MRB w/ acute to subacute infarcts, CTA H/N w/no LVO or HGS and TTE w/ no evident potential embolic source    Of note, patient also found to meet SIRS sepsis criteria and completed course of antibiotics. Work-up remained negative for acute infection.    Patient was seen by PT with recommendations for acute IPR. Patient was deemed medially stable on 1/27/25 and transferred to [facility] for acute IPR.       NAD this am  Wheezing less- pulmonary consulted, respi tx continues  O2 sats 80s RA 1/31 as well. NC in place. Hospitalist to follow up, ?diuresis for elevated BNP 1/30.   No HA, no chest pain, no palpitations.  Tolerating Eliquis-no overt bleeding.  Melatonin at bedtime  s/p MBS      MEDICATIONS  (STANDING):  albuterol    0.083% 2.5 milliGRAM(s) Nebulizer every 6 hours  ammonium lactate 12% Lotion 1 Application(s) Topical two times a day  apixaban 2.5 milliGRAM(s) Oral every 12 hours  atenolol  Tablet 50 milliGRAM(s) Oral daily  atorvastatin 40 milliGRAM(s) Oral at bedtime  buDESOnide    Inhalation Suspension 0.5 milliGRAM(s) Inhalation every 12 hours  escitalopram 20 milliGRAM(s) Oral daily  guaiFENesin  milliGRAM(s) Oral every 12 hours  melatonin 3 milliGRAM(s) Oral at bedtime  nystatin Powder 1 Application(s) Topical two times a day  pantoprazole    Tablet 40 milliGRAM(s) Oral before breakfast  senna 2 Tablet(s) Oral at bedtime    MEDICATIONS  (PRN):  acetaminophen     Tablet .. 650 milliGRAM(s) Oral every 6 hours PRN Mild Pain (1 - 3), Moderate Pain (4 - 6), Severe Pain (7 - 10)  albuterol    90 MICROgram(s) HFA Inhaler 2 Puff(s) Inhalation every 6 hours PRN Shortness of Breath and/or Wheezing  polyethylene glycol 3350 17 Gram(s) Oral daily PRN Constipation                            13.0   7.98  )-----------( 263      ( 30 Jan 2025 06:43 )             38.5     01-30    134[L]  |  101  |  12  ----------------------------<  101[H]  4.0   |  28  |  0.50    Ca    8.7      30 Jan 2025 06:43  Phos  3.8     01-30  Mg     1.8     01-30    TPro  7.5  /  Alb  2.3[L]  /  TBili  1.1  /  DBili  x   /  AST  26  /  ALT  22  /  AlkPhos  94  01-30    Urinalysis Basic - ( 30 Jan 2025 06:43 )    Color: x / Appearance: x / SG: x / pH: x  Gluc: 101 mg/dL / Ketone: x  / Bili: x / Urobili: x   Blood: x / Protein: x / Nitrite: x   Leuk Esterase: x / RBC: x / WBC x   Sq Epi: x / Non Sq Epi: x / Bacteria: x        Vital Signs Last 24 Hrs  T(C): 36.8 (31 Jan 2025 08:35), Max: 37 (30 Jan 2025 20:16)  T(F): 98.2 (31 Jan 2025 08:35), Max: 98.6 (30 Jan 2025 20:16)  HR: 70 (31 Jan 2025 08:35) (70 - 90)  BP: 109/61 (31 Jan 2025 08:35) (101/65 - 128/91)  BP(mean): --  RR: 16 (31 Jan 2025 08:35) (16 - 16)  SpO2: 96% (31 Jan 2025 08:35) (96% - 97%)    Parameters below as of 31 Jan 2025 08:35  Patient On (Oxygen Delivery Method): nasal cannula  O2 Flow (L/min): 2       Gen - frail appearing  HEENT - EOMI, MMM, PERRLA, Normal Conjunctivae  Neck - Supple  Pulm - wheezing, crackles, diminished   Cardiovascular - RRR  Abdomen - Soft, NT/ND, +BS  Extremities -  BLE trace edema  Neuro-     Cognitive - awake, alert, oriented to person, place and year. Follows commands well.     Communication - Fluent, +Hypophonic     Attention: Intact     Cranial Nerves -No new deficits     Motor -                     LEFT    UE - ShAB 5/5, EF 5/5, EE 3/5,  4/5                    RIGHT UE - ShAB 5/5, EF 5/5, EE 3/5,   4/5                    LEFT    LE - HF 5/5, KE 5/5, DF 5/5, PF 5/5                    RIGHT LE - HF 5/5, KE 5/5, DF 5/5, PF 5/5        Sensory - Intact  to LT  Psychiatric - Mood stable, Affect WNL  Skin:  Generalized ecchymosis/scabbing, facial blanchable redness, b/l elbows blanchable redness, R hand middle finger abrasion healed, moisture associated dermatitis groin, L hip healed abrasion, stage 1 sacral pressure injury         Continue comprehensive acute rehab program consisting of 3hrs/day of OT/PT and SLP.

## 2025-02-01 PROCEDURE — 99232 SBSQ HOSP IP/OBS MODERATE 35: CPT

## 2025-02-01 PROCEDURE — 99232 SBSQ HOSP IP/OBS MODERATE 35: CPT | Mod: GC

## 2025-02-01 RX ADMIN — PANTOPRAZOLE 40 MILLIGRAM(S): 20 TABLET, DELAYED RELEASE ORAL at 06:04

## 2025-02-01 RX ADMIN — BUDESONIDE 0.5 MILLIGRAM(S): 9 TABLET, EXTENDED RELEASE ORAL at 21:35

## 2025-02-01 RX ADMIN — Medication 600 MILLIGRAM(S): at 17:20

## 2025-02-01 RX ADMIN — NYSTATIN 1 APPLICATION(S): 100000 POWDER TOPICAL at 06:04

## 2025-02-01 RX ADMIN — ATORVASTATIN CALCIUM 40 MILLIGRAM(S): 80 TABLET, FILM COATED ORAL at 21:13

## 2025-02-01 RX ADMIN — Medication 25 MILLIGRAM(S): at 06:03

## 2025-02-01 RX ADMIN — Medication 600 MILLIGRAM(S): at 06:04

## 2025-02-01 RX ADMIN — Medication 2.5 MILLIGRAM(S): at 21:35

## 2025-02-01 RX ADMIN — Medication 20 MILLIGRAM(S): at 06:03

## 2025-02-01 RX ADMIN — APIXABAN 2.5 MILLIGRAM(S): 5 TABLET, FILM COATED ORAL at 17:20

## 2025-02-01 RX ADMIN — Medication 2 TABLET(S): at 21:13

## 2025-02-01 RX ADMIN — ESCITALOPRAM 20 MILLIGRAM(S): 10 TABLET, FILM COATED ORAL at 11:35

## 2025-02-01 RX ADMIN — NYSTATIN 1 APPLICATION(S): 100000 POWDER TOPICAL at 17:21

## 2025-02-01 RX ADMIN — Medication 1 APPLICATION(S): at 06:04

## 2025-02-01 RX ADMIN — APIXABAN 2.5 MILLIGRAM(S): 5 TABLET, FILM COATED ORAL at 06:03

## 2025-02-01 RX ADMIN — ACETAMINOPHEN, DIPHENHYDRAMINE HCL, PHENYLEPHRINE HCL 3 MILLIGRAM(S): 325; 25; 5 TABLET ORAL at 21:13

## 2025-02-01 RX ADMIN — Medication 1 APPLICATION(S): at 17:21

## 2025-02-01 NOTE — PROGRESS NOTE ADULT - ASSESSMENT
89 year-old female with a PMH of HLD, paroxysmal Afib (not on AC at home, only on BB), SVT, rheumatoid arthritis, and Mycobacterium avium complex   with Gait Instability, ADL impairments and Functional impairments.      #Gait Instability, ADL impairments and Functional impairments  - Comprehensive Rehab Program of PT/OT/SLP    #CVA  -Continue statin  -on eliquis 2.5mg BID for Pafib  - MBS completed-diet adjusted  -Follow up with your PCP and Neurologist in 1 week from discharge from rehab    #Endo: HbA1C: 5.8   -Monitor fasting glucose levels on routine labs   -Follow up with PCP after dc from rehab    # Atrial fibrillation   -Continue AC with Eliquis   -on atenolol- hospitalist to see  -Follow up with cardiologist    #SIRS at referring hospital (WBC 16 with tachycardia)-resolved   -S/o Rocephin IV  -infectious w/u did not ret reveal acute infection     #history of Mycobacterium avium complex  -CTA chest 1/24--> Bilateral pleural effusions and lower lobe passive atelectasis. Bilateral groundglass opacities within the upper lobes, lingula, and lower lobes and centrilobular nodules are of uncertain etilogy. The right lower lobe ill-defined 1.6 x 0.8 cm opacity.  -Repeat CT chest without contrast is recommended in 8- weeks to evaluate for resolution/change.  -prn albuterol inhaler   - Supplemental O2. Monitor resp status.   - pulm in, BNP up    #RA  -on Xeljanz at home, not on formulary at [facility], family will need to bring home supply     #Pain control  - Tylenol PRN    #GI/Bowel Mgmt   - Continue Senna at bedtime   - Miralax prn   - Protonix for GI ppx while hospitalized    #Bladder management  -no retention reported, continent  - toileting schedule    #BLE calf pain  - Duplex US-on Eliquis- US 1/28 by covering team-NEG    #Skin:  - stage 1 pressure ulcer scarum  - turn and position q2h   - Nystatin BID    FEN   - Diet - DASH   - Dysphagia  SLP - evaluation and treatment- [facility] 1/29    Precautions / PROPHYLAXIS:   - Falls, Cardiac, Seizure   - Lungs: Aspiration, Incentive Spirometer   - Pressure injury/Skin: Turn Q2hrs while in bed, OOB to Chair, PT/OT        Follow up with these providers:   General neurology at [address] [place] [address], [place],  1-2 weeks after discharge. Please instruct the patient to call [phone]  to schedule this appointment.    PCP Dr Rhodes    ------------------------------------  - Chart reviewed  - Continue comprehensive rehabilitation program. Patient requires active and ongoing therapeutic interventions of multiple disciplines and can tolerate 3h/d of therapies. Can actively participate and benefit from an intensive rehabilitation program. Requires supervision of a rehabilitation physician and a coordinated interdisciplinary approach to providing rehabilitation.   - Continue current medications  - Follow up: monitor respiratory status, wean O2 as tolerated, monitor fever curve  - Discussed with resident on call and interdisciplinary team

## 2025-02-01 NOTE — PROGRESS NOTE ADULT - SUBJECTIVE AND OBJECTIVE BOX
PROGRESS NOTE:     Patient is a 89y old  Female who presents with a chief complaint of CVA (31 Jan 2025 13:48)          SUBJECTIVE & OBJECTIVE:   Pt seen and examined at bedside in AM    no overnight events.       REVIEW OF SYSTEMS: remaining ROS negative     PHYSICAL EXAM:  VITALS:  Vital Signs Last 24 Hrs  T(C): 36.6 (01 Feb 2025 08:25), Max: 36.7 (31 Jan 2025 19:07)  T(F): 97.8 (01 Feb 2025 08:25), Max: 98.1 (31 Jan 2025 19:07)  HR: 71 (01 Feb 2025 08:25) (71 - 97)  BP: 103/69 (01 Feb 2025 08:25) (103/69 - 121/78)  BP(mean): --  RR: 17 (01 Feb 2025 08:25) (16 - 17)  SpO2: 94% (01 Feb 2025 08:25) (94% - 97%)    Parameters below as of 01 Feb 2025 08:25  Patient On (Oxygen Delivery Method): nasal cannula  O2 Flow (L/min): 2        GENERAL: NAD,  no increased WOB  HEAD:  Atraumatic, Normocephalic  EYES: EOMI, conjunctiva and sclera clear  ENMT: Moist mucous membranes  NECK: Supple, No JVD  NERVOUS SYSTEM:  Alert   CHEST/LUNG: Clear to auscultation bilaterally; No rales, rhonchi, wheezing  HEART: Regular rate and rhythm  ABDOMEN: Soft, Nontender, Nondistended; Bowel sounds present  EXTREMITIES:  No clubbing, cyanosis, calf tenderness or edema b/l      MEDICATIONS  (STANDING):  albuterol    0.083% 2.5 milliGRAM(s) Nebulizer every 6 hours  ammonium lactate 12% Lotion 1 Application(s) Topical two times a day  apixaban 2.5 milliGRAM(s) Oral every 12 hours  atenolol  Tablet 25 milliGRAM(s) Oral daily  atorvastatin 40 milliGRAM(s) Oral at bedtime  buDESOnide    Inhalation Suspension 0.5 milliGRAM(s) Inhalation every 12 hours  escitalopram 20 milliGRAM(s) Oral daily  furosemide    Tablet 20 milliGRAM(s) Oral daily  guaiFENesin  milliGRAM(s) Oral every 12 hours  melatonin 3 milliGRAM(s) Oral at bedtime  nystatin Powder 1 Application(s) Topical two times a day  pantoprazole    Tablet 40 milliGRAM(s) Oral before breakfast  senna 2 Tablet(s) Oral at bedtime    MEDICATIONS  (PRN):  acetaminophen     Tablet .. 650 milliGRAM(s) Oral every 6 hours PRN Mild Pain (1 - 3), Moderate Pain (4 - 6), Severe Pain (7 - 10)  albuterol    90 MICROgram(s) HFA Inhaler 2 Puff(s) Inhalation every 6 hours PRN Shortness of Breath and/or Wheezing  polyethylene glycol 3350 17 Gram(s) Oral daily PRN Constipation      Allergies    Cardizem (Rash)  Haider (Unknown)    Intolerances              LABS:                 CAPILLARY BLOOD GLUCOSE                    RECENT CULTURES:          RADIOLOGY & ADDITIONAL TESTS:

## 2025-02-01 NOTE — PROGRESS NOTE ADULT - SUBJECTIVE AND OBJECTIVE BOX
Patient is a 89y old  Female who presents with a chief complaint of CVA (01 Feb 2025 10:47)    ---------------------------------------------------------------------  SUBJECTIVE:  Seen and evaluated at bedside this AM. No acute issues overnight. Cough overnight. Working on weaning O2. Afebrile.    Other ROS:  Denies: headache, CP, SOB, pain, bowel or bladder issues  ----------------------------------------------------------------------  PHYSICAL EXAM:    Vital Signs Last 24 Hrs  T(C): 36.6 (01 Feb 2025 08:25), Max: 36.7 (31 Jan 2025 19:07)  T(F): 97.8 (01 Feb 2025 08:25), Max: 98.1 (31 Jan 2025 19:07)  HR: 71 (01 Feb 2025 08:25) (71 - 97)  BP: 103/69 (01 Feb 2025 08:25) (103/69 - 121/78)  BP(mean): --  RR: 17 (01 Feb 2025 08:25) (16 - 17)  SpO2: 94% (01 Feb 2025 08:25) (94% - 97%)    Parameters below as of 01 Feb 2025 08:25  Patient On (Oxygen Delivery Method): nasal cannula  O2 Flow (L/min): 2    Daily     Daily     PHYSICAL EXAM:    General - NAD, alert, follows commands  Heart- pulse regular  Lungs- breathing comfortably without respiratory distress  Abd- no visible abdominal distension   Ext- No calf pain, extremities well perfused  Neuro- moves all extremities against gravity while in bed  ----------------------------------------------------------------------  RECENT LABS:                CAPILLARY BLOOD GLUCOSE        ----------------------------------------------------------------------  RECENT IMAGING:    < from: Xray Chest 1 View- PORTABLE-Routine (Xray Chest 1 View- PORTABLE-Routine .) (01.30.25 @ 20:44) >  IMPRESSION:  Questionable left upper lobe infiltrate. Follow-up study is recommended   as clinically warranted.      < end of copied text >    ----------------------------------------------------------------------  MEDICATIONS:  MEDICATIONS  (STANDING):  albuterol    0.083% 2.5 milliGRAM(s) Nebulizer every 6 hours  ammonium lactate 12% Lotion 1 Application(s) Topical two times a day  apixaban 2.5 milliGRAM(s) Oral every 12 hours  atenolol  Tablet 25 milliGRAM(s) Oral daily  atorvastatin 40 milliGRAM(s) Oral at bedtime  buDESOnide    Inhalation Suspension 0.5 milliGRAM(s) Inhalation every 12 hours  escitalopram 20 milliGRAM(s) Oral daily  furosemide    Tablet 20 milliGRAM(s) Oral daily  guaiFENesin  milliGRAM(s) Oral every 12 hours  melatonin 3 milliGRAM(s) Oral at bedtime  nystatin Powder 1 Application(s) Topical two times a day  pantoprazole    Tablet 40 milliGRAM(s) Oral before breakfast  senna 2 Tablet(s) Oral at bedtime    MEDICATIONS  (PRN):  acetaminophen     Tablet .. 650 milliGRAM(s) Oral every 6 hours PRN Mild Pain (1 - 3), Moderate Pain (4 - 6), Severe Pain (7 - 10)  albuterol    90 MICROgram(s) HFA Inhaler 2 Puff(s) Inhalation every 6 hours PRN Shortness of Breath and/or Wheezing  polyethylene glycol 3350 17 Gram(s) Oral daily PRN Constipation    ----------------------------------------------------------------------

## 2025-02-01 NOTE — PROGRESS NOTE ADULT - SUBJECTIVE AND OBJECTIVE BOX
Time of Visit:  Patient seen and examined.     MEDICATIONS  (STANDING):  albuterol    0.083% 2.5 milliGRAM(s) Nebulizer every 6 hours  ammonium lactate 12% Lotion 1 Application(s) Topical two times a day  apixaban 2.5 milliGRAM(s) Oral every 12 hours  atenolol  Tablet 25 milliGRAM(s) Oral daily  atorvastatin 40 milliGRAM(s) Oral at bedtime  buDESOnide    Inhalation Suspension 0.5 milliGRAM(s) Inhalation every 12 hours  escitalopram 20 milliGRAM(s) Oral daily  furosemide    Tablet 20 milliGRAM(s) Oral daily  guaiFENesin  milliGRAM(s) Oral every 12 hours  melatonin 3 milliGRAM(s) Oral at bedtime  nystatin Powder 1 Application(s) Topical two times a day  pantoprazole    Tablet 40 milliGRAM(s) Oral before breakfast  senna 2 Tablet(s) Oral at bedtime      MEDICATIONS  (PRN):  acetaminophen     Tablet .. 650 milliGRAM(s) Oral every 6 hours PRN Mild Pain (1 - 3), Moderate Pain (4 - 6), Severe Pain (7 - 10)  albuterol    90 MICROgram(s) HFA Inhaler 2 Puff(s) Inhalation every 6 hours PRN Shortness of Breath and/or Wheezing  polyethylene glycol 3350 17 Gram(s) Oral daily PRN Constipation       Medications up to date at time of exam.      PHYSICAL EXAMINATION:  Patient has no new complaints.  GENERAL: The patient  in no apparent distress.     Vital Signs Last 24 Hrs  T(C): 36.6 (01 Feb 2025 08:25), Max: 36.6 (01 Feb 2025 08:25)  T(F): 97.8 (01 Feb 2025 08:25), Max: 97.8 (01 Feb 2025 08:25)  HR: 71 (01 Feb 2025 08:25) (71 - 90)  BP: 103/69 (01 Feb 2025 08:25) (103/69 - 121/78)  BP(mean): --  RR: 17 (01 Feb 2025 08:25) (17 - 17)  SpO2: 94% (01 Feb 2025 08:25) (94% - 97%)    Parameters below as of 01 Feb 2025 08:25  Patient On (Oxygen Delivery Method): nasal cannula  O2 Flow (L/min): 2     (if applicable)    Chest Tube (if applicable)    HEENT: Head is normocephalic and atraumatic. Extraocular muscles are intact. Mucous membranes are moist.     NECK: Supple, no palpable adenopathy.    LUNGS: Fair bilateral air entry   no wheezing, rales, or rhonchi.    HEART: Regular rate and rhythm without murmur.    ABDOMEN: Soft, nontender, and nondistended.  No hepatosplenomegaly is noted.    : No painful voiding, no pelvic pain    EXTREMITIES: Without any cyanosis, clubbing, rash, lesions or edema.    NEUROLOGIC: Awake, alert, oriented, grossly intact    SKIN: Warm, dry, good turgor.      LABS:                          Procalcitonin: 0.05 ng/mL (01-30-25 @ 06:43)      MICROBIOLOGY: (if applicable)    RADIOLOGY & ADDITIONAL STUDIES:  EKG:   CXR:  ECHO:    IMPRESSION: 89y Female PAST MEDICAL & SURGICAL HISTORY:  Atrial fibrillation      Rheumatoid arthritis      Paroxysmal atrial fibrillation      HTN (hypertension)      JACINTA (mycobacterium avium-intracellulare)      No significant past surgical history       p/w         Assessment  1. Hypoxia unsure cause, but improving today   2. Abnormal CT chest - unsure if acute changes or chronic - h/o JACINTA  3.Afib on eliquis with recent stroke  4. Underlying H/o RA xeljanz , DVT      Plan  Elevated BNP   will give trial of diuretics  d/w Dr. Junior today - primary pulm     no h/o nodule - will need out patient ct imaging after discharge    chronic bronchiectasis  CXR pending  currently on n/c o2    taper to off  monitor sat on room air rest/exertion daily

## 2025-02-01 NOTE — PROGRESS NOTE ADULT - ASSESSMENT
89 year-old female with a PMH of HLD, paroxysmal Afib (not on AC at home, only on BB), SVT, rheumatoid arthritis, and Mycobacterium avium complex   with Gait Instability, ADL impairments and Functional impairments.    #CVA  -Continue statin, Eliquis   - PT/OT/SLP  -Follow up with your PCP and Neurologist in 1 week from discharge from rehab    #Pre-diabetes   -diet control  -Follow up with PCP after dc from rehab    # Atrial fibrillation   - Eliquis   - atenolol   -Follow up with cardiologist 1 week after dc from rehab    #met SIRS criteria at referring hospital (WBC 16 with tachycardia)-resolved   -S/p Rocephin IV  -infectious w/u did not reveal acute infection     #history of Mycobacterium avium complex  -sats noted to be 85 to 86% on RA, improved to 92% with 2L NC.   -CTA chest 1/24 - Bilateral pleural effusions and lower lobe passive atelectasis. Bilateral groundglass opacities within the upper lobes, lingula, and lower lobes and centrilobular nodules are of uncertain etilogy.     The right lower lobe ill-defined 1.6 x 0.8 cm opacity.  - Repeat CT chest without contrast is recommended in 8- weeks to evaluate for resolution/change.  - change Asmanex to budesonide nebulizer and albuterol   - prn albuterol   - supplement O2 PRN   - seen by pulmo, recommendations reviewed.    #RA  -on Xeljanz at home, not on formulary at [facility], family will need to bring home supply     # depression- Lexapro    #DVT  - Eliquis

## 2025-02-02 PROCEDURE — 99232 SBSQ HOSP IP/OBS MODERATE 35: CPT

## 2025-02-02 PROCEDURE — 99232 SBSQ HOSP IP/OBS MODERATE 35: CPT | Mod: GC

## 2025-02-02 RX ADMIN — NYSTATIN 1 APPLICATION(S): 100000 POWDER TOPICAL at 06:06

## 2025-02-02 RX ADMIN — ACETAMINOPHEN, DIPHENHYDRAMINE HCL, PHENYLEPHRINE HCL 3 MILLIGRAM(S): 325; 25; 5 TABLET ORAL at 22:14

## 2025-02-02 RX ADMIN — Medication 2 TABLET(S): at 22:14

## 2025-02-02 RX ADMIN — ATORVASTATIN CALCIUM 40 MILLIGRAM(S): 80 TABLET, FILM COATED ORAL at 22:14

## 2025-02-02 RX ADMIN — APIXABAN 2.5 MILLIGRAM(S): 5 TABLET, FILM COATED ORAL at 06:06

## 2025-02-02 RX ADMIN — BUDESONIDE 0.5 MILLIGRAM(S): 9 TABLET, EXTENDED RELEASE ORAL at 09:21

## 2025-02-02 RX ADMIN — Medication 2.5 MILLIGRAM(S): at 09:21

## 2025-02-02 RX ADMIN — NYSTATIN 1 APPLICATION(S): 100000 POWDER TOPICAL at 17:43

## 2025-02-02 RX ADMIN — PANTOPRAZOLE 40 MILLIGRAM(S): 20 TABLET, DELAYED RELEASE ORAL at 06:06

## 2025-02-02 RX ADMIN — ESCITALOPRAM 20 MILLIGRAM(S): 10 TABLET, FILM COATED ORAL at 12:17

## 2025-02-02 RX ADMIN — APIXABAN 2.5 MILLIGRAM(S): 5 TABLET, FILM COATED ORAL at 17:43

## 2025-02-02 RX ADMIN — Medication 600 MILLIGRAM(S): at 06:06

## 2025-02-02 RX ADMIN — Medication 20 MILLIGRAM(S): at 06:06

## 2025-02-02 RX ADMIN — Medication 1 APPLICATION(S): at 06:06

## 2025-02-02 RX ADMIN — Medication 1 APPLICATION(S): at 17:43

## 2025-02-02 RX ADMIN — Medication 25 MILLIGRAM(S): at 06:06

## 2025-02-02 NOTE — PROGRESS NOTE ADULT - SUBJECTIVE AND OBJECTIVE BOX
Follow-up Pulmonary Progress Note  Chief Complaint : Cerebral infarction      pt sat 94% on 2 L , when seen tapered to 1l  no cp sob, cough, sob    Allergies :Cardizem (Rash)  Haider (Unknown)      PAST MEDICAL & SURGICAL HISTORY:  Atrial fibrillation    Rheumatoid arthritis    Paroxysmal atrial fibrillation    HTN (hypertension)    JACINTA (mycobacterium avium-intracellulare)    No significant past surgical history        Medications:  MEDICATIONS  (STANDING):  albuterol    0.083% 2.5 milliGRAM(s) Nebulizer every 6 hours  ammonium lactate 12% Lotion 1 Application(s) Topical two times a day  apixaban 2.5 milliGRAM(s) Oral every 12 hours  atenolol  Tablet 25 milliGRAM(s) Oral daily  atorvastatin 40 milliGRAM(s) Oral at bedtime  buDESOnide    Inhalation Suspension 0.5 milliGRAM(s) Inhalation every 12 hours  escitalopram 20 milliGRAM(s) Oral daily  furosemide    Tablet 20 milliGRAM(s) Oral daily  melatonin 3 milliGRAM(s) Oral at bedtime  nystatin Powder 1 Application(s) Topical two times a day  pantoprazole    Tablet 40 milliGRAM(s) Oral before breakfast  senna 2 Tablet(s) Oral at bedtime    MEDICATIONS  (PRN):  acetaminophen     Tablet .. 650 milliGRAM(s) Oral every 6 hours PRN Mild Pain (1 - 3), Moderate Pain (4 - 6), Severe Pain (7 - 10)  albuterol    90 MICROgram(s) HFA Inhaler 2 Puff(s) Inhalation every 6 hours PRN Shortness of Breath and/or Wheezing  polyethylene glycol 3350 17 Gram(s) Oral daily PRN Constipation      Antibiotics History      Heme Medications   apixaban 2.5 milliGRAM(s) Oral every 12 hours, 01-27-25 @ 20:30      GI Medications  pantoprazole    Tablet 40 milliGRAM(s) Oral before breakfast, 01-27-25 @ 20:31, Routine  polyethylene glycol 3350 17 Gram(s) Oral daily, 01-27-25 @ 20:30, Routine PRN  senna 2 Tablet(s) Oral at bedtime, 01-27-25 @ 20:30, Routine       VITALS:  T(C): 36.7 (02-02-25 @ 09:02), Max: 36.7 (02-02-25 @ 09:02)  T(F): 98 (02-02-25 @ 09:02), Max: 98 (02-02-25 @ 09:02)  HR: 78 (02-02-25 @ 15:48) (78 - 84)  BP: 108/68 (02-02-25 @ 09:02) (108/68 - 134/88)  BP(mean): --  ABP: --  ABP(mean): --  RR: 17 (02-02-25 @ 18:34) (17 - 17)  SpO2: 93% (02-02-25 @ 18:34) (93% - 94%)  CVP(mm Hg): --  CVP(cm H2O): --    Ins and Outs

## 2025-02-02 NOTE — PROGRESS NOTE ADULT - ASSESSMENT
Physical Examination:  GENERAL:               Alert, Oriented, No acute distress.    HEENT:                     No JVD, dry MM  PULM:                     Bilateral air entry, dimminished to auscultation bilaterally, no significant sputum production, No Rales, No Rhonchi, No Wheezing  CVS:                         S1, S2,  No Murmur  ABD:                        Soft, nondistended, nontender, normoactive bowel sounds,   EXT:                         No edema, nontender, No Cyanosis or Clubbing   Vascular:                Cool Extremities,    NEURO:                  Alert, oriented, interactive,  follows commands  PSYC:                      Calm, + Insight. poor hisotrina          Assessment  1. Hypoxia unsure cause, but improving today   2. Abnormal CT chest - unsure if acute changes or chronic - h/o JACINTA  3.Afib on eliquis with recent stroke  4. Underlying H/o RA xeljanz , DVT      Plan  Elevated BNP - will give trial of diuretics  chronic bronchiectasis  Continue nebs  currently on n/c o2 with goal to  taper to off    check o2 sat on room air rest/exertion daily   f/u with Dr. Junior - primary pulm     no h/o nodule - will need out patient ct imaging after discharge

## 2025-02-02 NOTE — PROGRESS NOTE ADULT - SUBJECTIVE AND OBJECTIVE BOX
Patient is a 89y old  Female who presents with a chief complaint of CVA (2025 09:34)    ---------------------------------------------------------------------  SUBJECTIVE:  Seen and evaluated at bedside this AM. No acute issues overnight.     Other ROS:  Denies: headache, CP, SOB, pain, bowel or bladder issues  ----------------------------------------------------------------------  PHYSICAL EXAM:    Vital Signs Last 24 Hrs  T(C): 36.7 (2025 09:02), Max: 36.7 (2025 09:02)  T(F): 98 (2025 09:02), Max: 98 (2025 09:02)  HR: 78 (2025 09:21) (78 - 97)  BP: 108/68 (2025 09:02) (108/68 - 134/88)  BP(mean): --  RR: 17 (2025 09:02) (17 - 17)  SpO2: 94% (2025 09:21) (93% - 94%)    Parameters below as of 2025 09:21  Patient On (Oxygen Delivery Method): nasal cannula      Daily     Daily Weight in k.6 (2025 06:44)      PHYSICAL EXAM:    General - NAD, alert, follows commands  Heart- pulse regular  Lungs- breathing comfortably without respiratory distress  Abd- no visible abdominal distension   Ext- No calf pain, extremities well perfused  Neuro- moves all extremities against gravity while in bed    ----------------------------------------------------------------------  RECENT LABS:                CAPILLARY BLOOD GLUCOSE        ----------------------------------------------------------------------  RECENT IMAGING:    ***  ----------------------------------------------------------------------  MEDICATIONS:  MEDICATIONS  (STANDING):  albuterol    0.083% 2.5 milliGRAM(s) Nebulizer every 6 hours  ammonium lactate 12% Lotion 1 Application(s) Topical two times a day  apixaban 2.5 milliGRAM(s) Oral every 12 hours  atenolol  Tablet 25 milliGRAM(s) Oral daily  atorvastatin 40 milliGRAM(s) Oral at bedtime  buDESOnide    Inhalation Suspension 0.5 milliGRAM(s) Inhalation every 12 hours  escitalopram 20 milliGRAM(s) Oral daily  furosemide    Tablet 20 milliGRAM(s) Oral daily  melatonin 3 milliGRAM(s) Oral at bedtime  nystatin Powder 1 Application(s) Topical two times a day  pantoprazole    Tablet 40 milliGRAM(s) Oral before breakfast  senna 2 Tablet(s) Oral at bedtime    MEDICATIONS  (PRN):  acetaminophen     Tablet .. 650 milliGRAM(s) Oral every 6 hours PRN Mild Pain (1 - 3), Moderate Pain (4 - 6), Severe Pain (7 - 10)  albuterol    90 MICROgram(s) HFA Inhaler 2 Puff(s) Inhalation every 6 hours PRN Shortness of Breath and/or Wheezing  polyethylene glycol 3350 17 Gram(s) Oral daily PRN Constipation    ----------------------------------------------------------------------

## 2025-02-02 NOTE — PROGRESS NOTE ADULT - SUBJECTIVE AND OBJECTIVE BOX
PROGRESS NOTE:     Patient is a 89y old  Female who presents with a chief complaint of CVA (31 Jan 2025 13:48)          SUBJECTIVE & OBJECTIVE:   Pt seen and examined at bedside in AM    no overnight events.       REVIEW OF SYSTEMS: remaining ROS negative     PHYSICAL EXAM:  VITALS:  Vital Signs Last 24 Hrs  T(C): 36.7 (02 Feb 2025 09:02), Max: 36.7 (02 Feb 2025 09:02)  T(F): 98 (02 Feb 2025 09:02), Max: 98 (02 Feb 2025 09:02)  HR: 78 (02 Feb 2025 09:02) (78 - 97)  BP: 108/68 (02 Feb 2025 09:02) (108/68 - 134/88)  BP(mean): --  RR: 17 (02 Feb 2025 09:02) (17 - 17)  SpO2: 94% (02 Feb 2025 09:02) (93% - 94%)    Parameters below as of 02 Feb 2025 09:02  Patient On (Oxygen Delivery Method): nasal cannula  O2 Flow (L/min): 2        GENERAL: NAD,  no increased WOB  HEAD:  Atraumatic, Normocephalic  EYES: EOMI, conjunctiva and sclera clear  ENMT: Moist mucous membranes  NECK: Supple, No JVD  NERVOUS SYSTEM:  Alert   CHEST/LUNG: Clear to auscultation bilaterally; No rales, rhonchi, wheezing  HEART: Regular rate and rhythm  ABDOMEN: Soft, Nontender, Nondistended; Bowel sounds present  EXTREMITIES:  No clubbing, cyanosis, calf tenderness or edema b/l      MEDICATIONS  (STANDING):  albuterol    0.083% 2.5 milliGRAM(s) Nebulizer every 6 hours  ammonium lactate 12% Lotion 1 Application(s) Topical two times a day  apixaban 2.5 milliGRAM(s) Oral every 12 hours  atenolol  Tablet 25 milliGRAM(s) Oral daily  atorvastatin 40 milliGRAM(s) Oral at bedtime  buDESOnide    Inhalation Suspension 0.5 milliGRAM(s) Inhalation every 12 hours  escitalopram 20 milliGRAM(s) Oral daily  furosemide    Tablet 20 milliGRAM(s) Oral daily  melatonin 3 milliGRAM(s) Oral at bedtime  nystatin Powder 1 Application(s) Topical two times a day  pantoprazole    Tablet 40 milliGRAM(s) Oral before breakfast  senna 2 Tablet(s) Oral at bedtime    MEDICATIONS  (PRN):  acetaminophen     Tablet .. 650 milliGRAM(s) Oral every 6 hours PRN Mild Pain (1 - 3), Moderate Pain (4 - 6), Severe Pain (7 - 10)  albuterol    90 MICROgram(s) HFA Inhaler 2 Puff(s) Inhalation every 6 hours PRN Shortness of Breath and/or Wheezing  polyethylene glycol 3350 17 Gram(s) Oral daily PRN Constipation        Allergies    Cardizem (Rash)  [place] (Unknown)    Intolerances              LABS:                 CAPILLARY BLOOD GLUCOSE                    RECENT CULTURES:          RADIOLOGY & ADDITIONAL TESTS:

## 2025-02-02 NOTE — PROGRESS NOTE ADULT - ASSESSMENT
89 year-old female with a PMH of HLD, paroxysmal Afib (not on AC at home, only on BB), SVT, rheumatoid arthritis, and Mycobacterium avium complex   with Gait Instability, ADL impairments and Functional impairments.    #CVA  -Continue statin, Eliquis   - PT/OT/SLP  -Follow up with PCP and Neurologist in 1 week from discharge from rehab    #Pre-diabetes   -diet control  -Follow up with PCP after dc from rehab    # Atrial fibrillation   - Eliquis   - atenolol   -Follow up with cardiologist 1 week after dc from rehab    #met SIRS criteria at referring hospital (WBC 16 with tachycardia)-resolved   -S/p Rocephin IV  -infectious w/u did not reveal acute infection     #history of Mycobacterium avium complex  -sats noted to be 85 to 86% on RA, improved to 92% with 2L NC.   -CTA chest 1/24 - Bilateral pleural effusions and lower lobe passive atelectasis. Bilateral groundglass opacities within the upper lobes, lingula, and lower lobes and centrilobular nodules are of uncertain etilogy.     The right lower lobe ill-defined 1.6 x 0.8 cm opacity.  - Repeat CT chest without contrast is recommended in 8- weeks to evaluate for resolution/change.  - given elevated BNP, trial of diuretics  - change Asmanex to budesonide nebulizer and albuterol   - prn albuterol   - supplement O2 PRN   - seen by pulmo, recommendations reviewed.    #RA  -on Xeljanz at home, not on formulary at [facility], family will need to bring home supply     # depression- Lexapro    #DVT  - Eliquis

## 2025-02-02 NOTE — PROGRESS NOTE ADULT - ASSESSMENT
89 year-old female with a PMH of HLD, paroxysmal Afib (not on AC at home, only on BB), SVT, rheumatoid arthritis, and Mycobacterium avium complex   with Gait Instability, ADL impairments and Functional impairments.      #Gait Instability, ADL impairments and Functional impairments  - Comprehensive Rehab Program of PT/OT/SLP    #CVA  -Continue statin  -on eliquis 2.5mg BID for Pafib  - MBS completed-diet adjusted  -Follow up with your PCP and Neurologist in 1 week from discharge from rehab    #Endo: HbA1C: 5.8   -Monitor fasting glucose levels on routine labs   -Follow up with PCP after dc from rehab    # Atrial fibrillation   -Continue AC with Eliquis   -on atenolol- hospitalist to see  -Follow up with cardiologist    #SIRS at referring hospital (WBC 16 with tachycardia)-resolved   -S/o Rocephin IV  -infectious w/u did not ret reveal acute infection     #history of Mycobacterium avium complex  -CTA chest 1/24--> Bilateral pleural effusions and lower lobe passive atelectasis. Bilateral groundglass opacities within the upper lobes, lingula, and lower lobes and centrilobular nodules are of uncertain etilogy. The right lower lobe ill-defined 1.6 x 0.8 cm opacity.  -Repeat CT chest without contrast is recommended in 8- weeks to evaluate for resolution/change.  -prn albuterol inhaler   - Supplemental O2. Monitor resp status.   - Pulmonology recs 2/1: elevated BUN, trial diuretics, , taper O2    #RA  -on Xeljanz at home, not on formulary at [facility], family will need to bring home supply     #Pain control  - Tylenol PRN    #GI/Bowel Mgmt   - Continue Senna at bedtime   - Miralax prn   - Protonix for GI ppx while hospitalized    #Bladder management  -no retention reported, continent  - toileting schedule    #BLE calf pain  - Duplex US-on Eliquis- US 1/28 by covering team-NEG    #Skin:  - stage 1 pressure ulcer scarum  - turn and position q2h   - Nystatin BID    FEN   - Diet - DASH   - Dysphagia  SLP - evaluation and treatment- MBS 1/29    Precautions / PROPHYLAXIS:   - Falls, Cardiac, Seizure   - Lungs: Aspiration, Incentive Spirometer   - Pressure injury/Skin: Turn Q2hrs while in bed, OOB to Chair, PT/OT        Follow up with these providers:   General neurolog[facility] at [facility], [facility], [facility],  1-2 weeks after discharge. Please instruct the patient to call [phone]  to schedule this appointment.    PCP Dr Rhodes    ------------------------------------  - Chart reviewed  - Continue comprehensive rehabilitation program. Patient requires active and ongoing therapeutic interventions of multiple disciplines and can tolerate 3h/d of therapies. Can actively participate and benefit from an intensive rehabilitation program. Requires supervision of a rehabilitation physician and a coordinated interdisciplinary approach to providing rehabilitation.   - Continue current medications  - Follow up: monitor respiratory status, wean O2 as tolerated, diuretic challenge  - Discussed with resident on call and interdisciplinary team

## 2025-02-03 LAB
ALBUMIN SERPL ELPH-MCNC: 2.3 G/DL — LOW (ref 3.3–5)
ALP SERPL-CCNC: 101 U/L — SIGNIFICANT CHANGE UP (ref 40–120)
ALT FLD-CCNC: 18 U/L — SIGNIFICANT CHANGE UP (ref 10–45)
ANION GAP SERPL CALC-SCNC: 6 MMOL/L — SIGNIFICANT CHANGE UP (ref 5–17)
AST SERPL-CCNC: 24 U/L — SIGNIFICANT CHANGE UP (ref 10–40)
BASOPHILS # BLD AUTO: 0.03 K/UL — SIGNIFICANT CHANGE UP (ref 0–0.2)
BASOPHILS NFR BLD AUTO: 0.4 % — SIGNIFICANT CHANGE UP (ref 0–2)
BILIRUB SERPL-MCNC: 1 MG/DL — SIGNIFICANT CHANGE UP (ref 0.2–1.2)
BUN SERPL-MCNC: 12 MG/DL — SIGNIFICANT CHANGE UP (ref 7–23)
CALCIUM SERPL-MCNC: 9 MG/DL — SIGNIFICANT CHANGE UP (ref 8.4–10.5)
CHLORIDE SERPL-SCNC: 99 MMOL/L — SIGNIFICANT CHANGE UP (ref 96–108)
CO2 SERPL-SCNC: 29 MMOL/L — SIGNIFICANT CHANGE UP (ref 22–31)
CREAT SERPL-MCNC: 0.7 MG/DL — SIGNIFICANT CHANGE UP (ref 0.5–1.3)
EGFR: 81 ML/MIN/1.73M2 — SIGNIFICANT CHANGE UP
EOSINOPHIL # BLD AUTO: 0.25 K/UL — SIGNIFICANT CHANGE UP (ref 0–0.5)
EOSINOPHIL NFR BLD AUTO: 3.6 % — SIGNIFICANT CHANGE UP (ref 0–6)
GLUCOSE SERPL-MCNC: 108 MG/DL — HIGH (ref 70–99)
HCT VFR BLD CALC: 41 % — SIGNIFICANT CHANGE UP (ref 34.5–45)
HGB BLD-MCNC: 13.1 G/DL — SIGNIFICANT CHANGE UP (ref 11.5–15.5)
IMM GRANULOCYTES NFR BLD AUTO: 0.4 % — SIGNIFICANT CHANGE UP (ref 0–0.9)
LYMPHOCYTES # BLD AUTO: 0.55 K/UL — LOW (ref 1–3.3)
LYMPHOCYTES # BLD AUTO: 7.9 % — LOW (ref 13–44)
MCHC RBC-ENTMCNC: 31.3 PG — SIGNIFICANT CHANGE UP (ref 27–34)
MCHC RBC-ENTMCNC: 32 G/DL — SIGNIFICANT CHANGE UP (ref 32–36)
MCV RBC AUTO: 97.9 FL — SIGNIFICANT CHANGE UP (ref 80–100)
MONOCYTES # BLD AUTO: 0.48 K/UL — SIGNIFICANT CHANGE UP (ref 0–0.9)
MONOCYTES NFR BLD AUTO: 6.9 % — SIGNIFICANT CHANGE UP (ref 2–14)
NEUTROPHILS # BLD AUTO: 5.6 K/UL — SIGNIFICANT CHANGE UP (ref 1.8–7.4)
NEUTROPHILS NFR BLD AUTO: 80.8 % — HIGH (ref 43–77)
NRBC # BLD: 0 /100 WBCS — SIGNIFICANT CHANGE UP (ref 0–0)
NRBC BLD-RTO: 0 /100 WBCS — SIGNIFICANT CHANGE UP (ref 0–0)
PLATELET # BLD AUTO: 270 K/UL — SIGNIFICANT CHANGE UP (ref 150–400)
POTASSIUM SERPL-MCNC: 4.5 MMOL/L — SIGNIFICANT CHANGE UP (ref 3.5–5.3)
POTASSIUM SERPL-SCNC: 4.5 MMOL/L — SIGNIFICANT CHANGE UP (ref 3.5–5.3)
PROT SERPL-MCNC: 7.8 G/DL — SIGNIFICANT CHANGE UP (ref 6–8.3)
RBC # BLD: 4.19 M/UL — SIGNIFICANT CHANGE UP (ref 3.8–5.2)
RBC # FLD: 14.1 % — SIGNIFICANT CHANGE UP (ref 10.3–14.5)
SODIUM SERPL-SCNC: 134 MMOL/L — LOW (ref 135–145)
WBC # BLD: 6.94 K/UL — SIGNIFICANT CHANGE UP (ref 3.8–10.5)
WBC # FLD AUTO: 6.94 K/UL — SIGNIFICANT CHANGE UP (ref 3.8–10.5)

## 2025-02-03 PROCEDURE — 99232 SBSQ HOSP IP/OBS MODERATE 35: CPT

## 2025-02-03 PROCEDURE — 99232 SBSQ HOSP IP/OBS MODERATE 35: CPT | Mod: GC

## 2025-02-03 RX ADMIN — BUDESONIDE 0.5 MILLIGRAM(S): 9 TABLET, EXTENDED RELEASE ORAL at 20:07

## 2025-02-03 RX ADMIN — NYSTATIN 1 APPLICATION(S): 100000 POWDER TOPICAL at 05:51

## 2025-02-03 RX ADMIN — Medication 1 APPLICATION(S): at 05:51

## 2025-02-03 RX ADMIN — ATORVASTATIN CALCIUM 40 MILLIGRAM(S): 80 TABLET, FILM COATED ORAL at 22:06

## 2025-02-03 RX ADMIN — Medication 2 TABLET(S): at 22:06

## 2025-02-03 RX ADMIN — Medication 2.5 MILLIGRAM(S): at 08:32

## 2025-02-03 RX ADMIN — ACETAMINOPHEN, DIPHENHYDRAMINE HCL, PHENYLEPHRINE HCL 3 MILLIGRAM(S): 325; 25; 5 TABLET ORAL at 22:05

## 2025-02-03 RX ADMIN — PANTOPRAZOLE 40 MILLIGRAM(S): 20 TABLET, DELAYED RELEASE ORAL at 05:48

## 2025-02-03 RX ADMIN — APIXABAN 2.5 MILLIGRAM(S): 5 TABLET, FILM COATED ORAL at 17:32

## 2025-02-03 RX ADMIN — Medication 1 APPLICATION(S): at 17:33

## 2025-02-03 RX ADMIN — Medication 20 MILLIGRAM(S): at 05:48

## 2025-02-03 RX ADMIN — Medication 2.5 MILLIGRAM(S): at 20:06

## 2025-02-03 RX ADMIN — APIXABAN 2.5 MILLIGRAM(S): 5 TABLET, FILM COATED ORAL at 05:49

## 2025-02-03 RX ADMIN — ESCITALOPRAM 20 MILLIGRAM(S): 10 TABLET, FILM COATED ORAL at 11:54

## 2025-02-03 RX ADMIN — BUDESONIDE 0.5 MILLIGRAM(S): 9 TABLET, EXTENDED RELEASE ORAL at 08:31

## 2025-02-03 RX ADMIN — Medication 25 MILLIGRAM(S): at 05:48

## 2025-02-03 RX ADMIN — NYSTATIN 1 APPLICATION(S): 100000 POWDER TOPICAL at 17:33

## 2025-02-03 NOTE — PROGRESS NOTE ADULT - SUBJECTIVE AND OBJECTIVE BOX
Follow-up Pulmonary Progress Note  Chief Complaint : Cerebral infarction      patent seen and examamined   on 1 L sat 94% when seen  no respiratory complaitns    Allergies :Cardizem (Rash)  [place] (Unknown)      PAST MEDICAL & SURGICAL HISTORY:  Atrial fibrillation    Rheumatoid arthritis    Paroxysmal atrial fibrillation    HTN (hypertension)    JACINTA (mycobacterium avium-intracellulare)    No significant past surgical history        Medications:  MEDICATIONS  (STANDING):  albuterol    0.083% 2.5 milliGRAM(s) Nebulizer every 6 hours  ammonium lactate 12% Lotion 1 Application(s) Topical two times a day  apixaban 2.5 milliGRAM(s) Oral every 12 hours  atenolol  Tablet 25 milliGRAM(s) Oral daily  atorvastatin 40 milliGRAM(s) Oral at bedtime  buDESOnide    Inhalation Suspension 0.5 milliGRAM(s) Inhalation every 12 hours  escitalopram 20 milliGRAM(s) Oral daily  furosemide    Tablet 20 milliGRAM(s) Oral daily  melatonin 3 milliGRAM(s) Oral at bedtime  nystatin Powder 1 Application(s) Topical two times a day  pantoprazole    Tablet 40 milliGRAM(s) Oral before breakfast  senna 2 Tablet(s) Oral at bedtime    MEDICATIONS  (PRN):  acetaminophen     Tablet .. 650 milliGRAM(s) Oral every 6 hours PRN Mild Pain (1 - 3), Moderate Pain (4 - 6), Severe Pain (7 - 10)  albuterol    90 MICROgram(s) HFA Inhaler 2 Puff(s) Inhalation every 6 hours PRN Shortness of Breath and/or Wheezing  polyethylene glycol 3350 17 Gram(s) Oral daily PRN Constipation      Antibiotics History      Heme Medications   apixaban 2.5 milliGRAM(s) Oral every 12 hours, 01-27-25 @ 20:30      GI Medications  pantoprazole    Tablet 40 milliGRAM(s) Oral before breakfast, 01-27-25 @ 20:31, Routine  polyethylene glycol 3350 17 Gram(s) Oral daily, 01-27-25 @ 20:30, Routine PRN  senna 2 Tablet(s) Oral at bedtime, 01-27-25 @ 20:30, Routine        LABS:                        13.1   6.94  )-----------( 270      ( 03 Feb 2025 06:07 )             41.0     02-03    134[L]  |  99  |  12  ----------------------------<  108[H]  4.5   |  29  |  0.70    Ca    9.0      03 Feb 2025 06:07    TPro  7.8  /  Alb  2.3[L]  /  TBili  1.0  /  DBili  x   /  AST  24  /  ALT  18  /  AlkPhos  101  02-03             VITALS:  T(C): 36.7 (02-03-25 @ 07:53), Max: 36.7 (02-03-25 @ 07:53)  T(F): 98 (02-03-25 @ 07:53), Max: 98 (02-03-25 @ 07:53)  HR: 80 (02-03-25 @ 07:53) (78 - 91)  BP: 122/78 (02-03-25 @ 07:53) (122/78 - 129/74)  BP(mean): --  ABP: --  ABP(mean): --  RR: 16 (02-03-25 @ 07:53) (16 - 17)  SpO2: 96% (02-03-25 @ 12:00) (93% - 96%)  CVP(mm Hg): --  CVP(cm H2O): --    Ins and Outs

## 2025-02-03 NOTE — PROGRESS NOTE ADULT - SUBJECTIVE AND OBJECTIVE BOX
HPI:  Patient is a 89 year old female with a PMH of HLD, paroxysmal Afib (not on AC at home, only on BB), SVT, rheumatoid arthritis, and Mycobacterium avium complex admitted to [facility] for acute encephalopathy and subacute CVA on 1/16/25. She had been found on the floor with AMS by her niece, who called EMS.   On arrival to ED,  patient had EKG which showed sinus tachycardia with RBB. Initial non con CTH with acute Vs subacute infarcts in Right BG and age indeterminate infarct in Left occipital lobe. Pt admitted for further work up and management of encephalopathy. Neuro consulted for stroke W/U. She was outside the window for TNK or antithrombotic on arrival to ED.  Patient was treated with aspirin suppository in the ED and admitted to tele.  Patient was monitored on tele monitor and no acute arrythmia were noted.  MRB w/ acute to subacute infarcts, CTA H/N w/no LVO or HGS and TTE w/ no evident potential embolic source    Of note, patient also found to meet SIRS sepsis criteria and completed course of antibiotics. Work-up remained negative for acute infection.    Patient was seen by PT with recommendations for acute IPR. Patient was deemed medially stable on 1/27/25 and transferred to [facility] for acute IPR.       NAD this am  Wheezing less- pulmonary consulted, respi tx continues  O2 sats 80s RA 1/31 as well. NC in place. Hospitalist to follow up, ?diuresis for elevated BNP 1/30.   No HA, no chest pain, no palpitations.  Tolerating Eliquis-no overt bleeding.  Melatonin at bedtime  s/p MBS      MEDICATIONS  (STANDING):  albuterol    0.083% 2.5 milliGRAM(s) Nebulizer every 6 hours  ammonium lactate 12% Lotion 1 Application(s) Topical two times a day  apixaban 2.5 milliGRAM(s) Oral every 12 hours  atenolol  Tablet 25 milliGRAM(s) Oral daily  atorvastatin 40 milliGRAM(s) Oral at bedtime  buDESOnide    Inhalation Suspension 0.5 milliGRAM(s) Inhalation every 12 hours  escitalopram 20 milliGRAM(s) Oral daily  furosemide    Tablet 20 milliGRAM(s) Oral daily  melatonin 3 milliGRAM(s) Oral at bedtime  nystatin Powder 1 Application(s) Topical two times a day  pantoprazole    Tablet 40 milliGRAM(s) Oral before breakfast  senna 2 Tablet(s) Oral at bedtime    MEDICATIONS  (PRN):  acetaminophen     Tablet .. 650 milliGRAM(s) Oral every 6 hours PRN Mild Pain (1 - 3), Moderate Pain (4 - 6), Severe Pain (7 - 10)  albuterol    90 MICROgram(s) HFA Inhaler 2 Puff(s) Inhalation every 6 hours PRN Shortness of Breath and/or Wheezing  polyethylene glycol 3350 17 Gram(s) Oral daily PRN Constipation                            13.1   6.94  )-----------( 270      ( 03 Feb 2025 06:07 )             41.0     02-03    134[L]  |  99  |  12  ----------------------------<  108[H]  4.5   |  29  |  0.70    Ca    9.0      03 Feb 2025 06:07    TPro  7.8  /  Alb  2.3[L]  /  TBili  1.0  /  DBili  x   /  AST  24  /  ALT  18  /  AlkPhos  101  02-03    Urinalysis Basic - ( 03 Feb 2025 06:07 )    Color: x / Appearance: x / SG: x / pH: x  Gluc: 108 mg/dL / Ketone: x  / Bili: x / Urobili: x   Blood: x / Protein: x / Nitrite: x   Leuk Esterase: x / RBC: x / WBC x   Sq Epi: x / Non Sq Epi: x / Bacteria: x      Vital Signs Last 24 Hrs  T(C): 36.7 (03 Feb 2025 07:53), Max: 36.7 (03 Feb 2025 07:53)  T(F): 98 (03 Feb 2025 07:53), Max: 98 (03 Feb 2025 07:53)  HR: 80 (03 Feb 2025 07:53) (78 - 91)  BP: 122/78 (03 Feb 2025 07:53) (122/78 - 129/74)  BP(mean): --  RR: 16 (03 Feb 2025 07:53) (16 - 17)  SpO2: 96% (03 Feb 2025 12:00) (93% - 96%)    Parameters below as of 03 Feb 2025 12:00  Patient On (Oxygen Delivery Method): room air         Gen - frail appearing  HEENT - EOMI, MMM, PERRLA, Normal Conjunctivae  Neck - Supple  Pulm - wheezing, crackles, diminished   Cardiovascular - RRR  Abdomen - Soft, NT/ND, +BS  Extremities -  BLE trace edema  Neuro-     Cognitive - awake, alert, oriented to person, place and year. Follows commands well.     Communication - Fluent, +Hypophonic     Attention: Intact     Cranial Nerves -No new deficits     Motor -                     LEFT    UE - ShAB 5/5, EF 5/5, EE 3/5,  4/5                    RIGHT UE - ShAB 5/5, EF 5/5, EE 3/5,   4/5                    LEFT    LE - HF 5/5, KE 5/5, DF 5/5, PF 5/5                    RIGHT LE - HF 5/5, KE 5/5, DF 5/5, PF 5/5        Sensory - Intact  to LT  Psychiatric - Mood stable, Affect WNL  Skin:  Generalized ecchymosis/scabbing, facial blanchable redness, b/l elbows blanchable redness, R hand middle finger abrasion healed, moisture associated dermatitis groin, L hip healed abrasion, stage 1 sacral pressure injury                 Continue comprehensive acute rehab program consisting of 3hrs/day of OT/PT and SLP. HPI:  Patient is a 89 year old female with a PMH of HLD, paroxysmal Afib (not on AC at home, only on BB), SVT, rheumatoid arthritis, and Mycobacterium avium complex admitted to [facility] for acute encephalopathy and subacute CVA on 1/16/25. She had been found on the floor with AMS by her niece, who called EMS.   On arrival to ED,  patient had EKG which showed sinus tachycardia with RBB. Initial non con CTH with acute Vs subacute infarcts in Right BG and age indeterminate infarct in Left occipital lobe. Pt admitted for further work up and management of encephalopathy. Neuro consulted for stroke W/U. She was outside the window for TNK or antithrombotic on arrival to ED.  Patient was treated with aspirin suppository in the ED and admitted to tele.  Patient was monitored on tele monitor and no acute arrythmia were noted.  MRB w/ acute to subacute infarcts, CTA H/N w/no LVO or HGS and TTE w/ no evident potential embolic source    Of note, patient also found to meet SIRS sepsis criteria and completed course of antibiotics. Work-up remained negative for acute infection.    Patient was seen by PT with recommendations for acute IPR. Patient was deemed medially stable on 1/27/25 and transferred to [facility] for acute IPR.       NAD this am  Wheezing less- pulmonary in, respi tx continues, lasix added. NC in place 1-2 L. Hospitalist to follow up.    No HA, no chest pain, no palpitations.  Tolerating Eliquis-no overt bleeding.  Melatonin at bedtime  s/p MBS      MEDICATIONS  (STANDING):  albuterol    0.083% 2.5 milliGRAM(s) Nebulizer every 6 hours  ammonium lactate 12% Lotion 1 Application(s) Topical two times a day  apixaban 2.5 milliGRAM(s) Oral every 12 hours  atenolol  Tablet 25 milliGRAM(s) Oral daily  atorvastatin 40 milliGRAM(s) Oral at bedtime  buDESOnide    Inhalation Suspension 0.5 milliGRAM(s) Inhalation every 12 hours  escitalopram 20 milliGRAM(s) Oral daily  furosemide    Tablet 20 milliGRAM(s) Oral daily  melatonin 3 milliGRAM(s) Oral at bedtime  nystatin Powder 1 Application(s) Topical two times a day  pantoprazole    Tablet 40 milliGRAM(s) Oral before breakfast  senna 2 Tablet(s) Oral at bedtime    MEDICATIONS  (PRN):  acetaminophen     Tablet .. 650 milliGRAM(s) Oral every 6 hours PRN Mild Pain (1 - 3), Moderate Pain (4 - 6), Severe Pain (7 - 10)  albuterol    90 MICROgram(s) HFA Inhaler 2 Puff(s) Inhalation every 6 hours PRN Shortness of Breath and/or Wheezing  polyethylene glycol 3350 17 Gram(s) Oral daily PRN Constipation                            13.1   6.94  )-----------( 270      ( 03 Feb 2025 06:07 )             41.0     02-03    134[L]  |  99  |  12  ----------------------------<  108[H]  4.5   |  29  |  0.70    Ca    9.0      03 Feb 2025 06:07    TPro  7.8  /  Alb  2.3[L]  /  TBili  1.0  /  DBili  x   /  AST  24  /  ALT  18  /  AlkPhos  101  02-03    Urinalysis Basic - ( 03 Feb 2025 06:07 )    Color: x / Appearance: x / SG: x / pH: x  Gluc: 108 mg/dL / Ketone: x  / Bili: x / Urobili: x   Blood: x / Protein: x / Nitrite: x   Leuk Esterase: x / RBC: x / WBC x   Sq Epi: x / Non Sq Epi: x / Bacteria: x      Vital Signs Last 24 Hrs  T(C): 36.7 (03 Feb 2025 07:53), Max: 36.7 (03 Feb 2025 07:53)  T(F): 98 (03 Feb 2025 07:53), Max: 98 (03 Feb 2025 07:53)  HR: 80 (03 Feb 2025 07:53) (78 - 91)  BP: 122/78 (03 Feb 2025 07:53) (122/78 - 129/74)  BP(mean): --  RR: 16 (03 Feb 2025 07:53) (16 - 17)  SpO2: 96% (03 Feb 2025 12:00) (93% - 96%)    Parameters below as of 03 Feb 2025 12:00  Patient On (Oxygen Delivery Method): room air         Gen - frail appearing  HEENT - EOMI, MMM, PERRLA, Normal Conjunctivae  Neck - Supple  Pulm - wheezing, crackles, diminished   Cardiovascular - RRR  Abdomen - Soft, NT/ND, +BS  Extremities -  BLE trace edema  Neuro-     Cognitive - awake, alert, oriented to person, place and year. Follows commands well.     Communication - Fluent, +Hypophonic     Attention: Intact     Cranial Nerves -No new deficits     Motor -                     LEFT    UE - ShAB 5/5, EF 5/5, EE 3/5,  4/5                    RIGHT UE - ShAB 5/5, EF 5/5, EE 3/5,   4/5                    LEFT    LE - HF 5/5, KE 5/5, DF 5/5, PF 5/5                    RIGHT LE - HF 5/5, KE 5/5, DF 5/5, PF 5/5        Sensory - Intact  to LT  Psychiatric - Mood stable, Affect WNL  Skin:  Generalized ecchymosis/scabbing, facial blanchable redness, b/l elbows blanchable redness, R hand middle finger abrasion healed, moisture associated dermatitis groin, L hip healed abrasion, stage 1 sacral pressure injury        Continue comprehensive acute rehab program consisting of 3hrs/day of OT/PT and SLP.

## 2025-02-03 NOTE — PROGRESS NOTE ADULT - SUBJECTIVE AND OBJECTIVE BOX
PROGRESS NOTE:     Patient is a 89y old  Female who presents with a chief complaint of CVA (31 Jan 2025 13:48)          SUBJECTIVE & OBJECTIVE:   Pt seen and examined at bedside in AM    no overnight events.       REVIEW OF SYSTEMS: remaining ROS negative     PHYSICAL EXAM:  VITALS:  Vital Signs Last 24 Hrs  T(C): 36.7 (03 Feb 2025 07:53), Max: 36.7 (03 Feb 2025 07:53)  T(F): 98 (03 Feb 2025 07:53), Max: 98 (03 Feb 2025 07:53)  HR: 80 (03 Feb 2025 07:53) (78 - 91)  BP: 122/78 (03 Feb 2025 07:53) (122/78 - 129/74)  BP(mean): --  RR: 16 (03 Feb 2025 07:53) (16 - 17)  SpO2: 94% (03 Feb 2025 07:53) (93% - 95%)    Parameters below as of 03 Feb 2025 07:53  Patient On (Oxygen Delivery Method): nasal cannula  O2 Flow (L/min): 1        GENERAL: NAD,  no increased WOB  HEAD:  Atraumatic, Normocephalic  EYES: EOMI, conjunctiva and sclera clear  ENMT: Moist mucous membranes  NECK: Supple, No JVD  NERVOUS SYSTEM:  Alert   CHEST/LUNG: Clear to auscultation bilaterally; No rales, rhonchi, wheezing  HEART: Regular rate and rhythm  ABDOMEN: Soft, Nontender, Nondistended; Bowel sounds present  EXTREMITIES:  No clubbing, cyanosis, calf tenderness or edema b/l      MEDICATIONS  (STANDING):  albuterol    0.083% 2.5 milliGRAM(s) Nebulizer every 6 hours  ammonium lactate 12% Lotion 1 Application(s) Topical two times a day  apixaban 2.5 milliGRAM(s) Oral every 12 hours  atenolol  Tablet 25 milliGRAM(s) Oral daily  atorvastatin 40 milliGRAM(s) Oral at bedtime  buDESOnide    Inhalation Suspension 0.5 milliGRAM(s) Inhalation every 12 hours  escitalopram 20 milliGRAM(s) Oral daily  furosemide    Tablet 20 milliGRAM(s) Oral daily  melatonin 3 milliGRAM(s) Oral at bedtime  nystatin Powder 1 Application(s) Topical two times a day  pantoprazole    Tablet 40 milliGRAM(s) Oral before breakfast  senna 2 Tablet(s) Oral at bedtime    MEDICATIONS  (PRN):  acetaminophen     Tablet .. 650 milliGRAM(s) Oral every 6 hours PRN Mild Pain (1 - 3), Moderate Pain (4 - 6), Severe Pain (7 - 10)  albuterol    90 MICROgram(s) HFA Inhaler 2 Puff(s) Inhalation every 6 hours PRN Shortness of Breath and/or Wheezing  polyethylene glycol 3350 17 Gram(s) Oral daily PRN Constipation          Allergies    Cardizem (Rash)  Molino (Unknown)    Intolerances              LABS:                           13.1   6.94  )-----------( 270      ( 03 Feb 2025 06:07 )             41.0     02-03    134[L]  |  99  |  12  ----------------------------<  108[H]  4.5   |  29  |  0.70    Ca    9.0      03 Feb 2025 06:07    TPro  7.8  /  Alb  2.3[L]  /  TBili  1.0  /  DBili  x   /  AST  24  /  ALT  18  /  AlkPhos  101  02-03    LIVER FUNCTIONS - ( 03 Feb 2025 06:07 )  Alb: 2.3 g/dL / Pro: 7.8 g/dL / ALK PHOS: 101 U/L / ALT: 18 U/L / AST: 24 U/L / GGT: x             Urinalysis Basic - ( 03 Feb 2025 06:07 )    Color: x / Appearance: x / SG: x / pH: x  Gluc: 108 mg/dL / Ketone: x  / Bili: x / Urobili: x   Blood: x / Protein: x / Nitrite: x   Leuk Esterase: x / RBC: x / WBC x   Sq Epi: x / Non Sq Epi: x / Bacteria: x                CAPILLARY BLOOD GLUCOSE                    RECENT CULTURES:          RADIOLOGY & ADDITIONAL TESTS:

## 2025-02-03 NOTE — PROGRESS NOTE ADULT - ASSESSMENT
89 year-old female with a PMH of HLD, paroxysmal Afib (not on AC at home, only on BB), SVT, rheumatoid arthritis, and Mycobacterium avium complex   with Gait Instability, ADL impairments and Functional impairments.      #Gait Instability, ADL impairments and Functional impairments  - Comprehensive Rehab Program of PT/OT/SLP    #CVA  -Continue statin  -on eliquis 2.5mg BID for Pafib  - MBS completed-diet adjusted  -Follow up with your PCP and Neurologist in 1 week from discharge from rehab    #Endo: HbA1C: 5.8   -Monitor fasting glucose levels on routine labs   -Follow up with PCP after dc from rehab    # Atrial fibrillation   -Continue AC with Eliquis   -on atenolol- hospitalist to see  -Follow up with cardiologist    #SIRS at referring hospital (WBC 16 with tachycardia)-resolved   -S/o Rocephin IV  -infectious w/u did not ret reveal acute infection     #history of Mycobacterium avium complex  -CTA chest 1/24--> Bilateral pleural effusions and lower lobe passive atelectasis. Bilateral groundglass opacities within the upper lobes, lingula, and lower lobes and centrilobular nodules are of uncertain etilogy. The right lower lobe ill-defined 1.6 x 0.8 cm opacity.  -Repeat CT chest without contrast is recommended in 8- weeks to evaluate for resolution/change.  -prn albuterol inhaler   - Supplemental O2. Monitor resp status.   - Pulmonology recs 2/1: elevated BUN, trial diuretics, , taper O2    #RA  -on Xeljanz at home, not on formulary at [facility], family will need to bring home supply     #Pain control  - Tylenol PRN    #GI/Bowel Mgmt   - Continue Senna at bedtime   - Miralax prn   - Protonix for GI ppx while hospitalized    #Bladder management  -no retention reported, continent  - toileting schedule    #BLE calf pain  - Duplex US-on Eliquis- US 1/28 by covering team-NEG    #Skin:  - stage 1 pressure ulcer scarum  - turn and position q2h   - Nystatin BID    FEN   - Diet - DASH   - Dysphagia  SLP - evaluation and treatment- MBS 1/29    Precautions / PROPHYLAXIS:   - Falls, Cardiac, Seizure   - Lungs: Aspiration, Incentive Spirometer   - Pressure injury/Skin: Turn Q2hrs while in bed, OOB to Chair, PT/OT        Follow up with these providers:   General neurology at [facility], [facility], [place],  1-2 weeks after discharge. Please instruct the patient to call [phone]  to schedule this appointment.    PCP Dr Rhodes

## 2025-02-03 NOTE — PROGRESS NOTE ADULT - ASSESSMENT
Physical Examination:  GENERAL:               Alert, Oriented, No acute distress.    HEENT:                     No JVD, dry MM  PULM:                     Bilateral air entry, dimminished to auscultation bilaterally, no significant sputum production, No Rales, No Rhonchi, No Wheezing  CVS:                         S1, S2,  No Murmur  ABD:                        Soft, nondistended, nontender, normoactive bowel sounds,   Vascular:                Cool Extremities,    NEURO:                  Alert, oriented, interactive,  follows commands  PSYC:                      Calm, + Insight. poor hisotrina          Assessment  1. Hypoxia unsure cause, but improving today   2. Abnormal CT chest - unsure if acute changes or chronic - h/o JACINTA  3.Afib on eliquis with recent stroke  4. Underlying H/o RA xeljanz , DVT      Plan  Elevated BNP - will give trial of diuretics  chronic bronchiectasis  Continue nebs  currently on n/c o2 with goal to  taper to off    check o2 sat on room air rest/exertion daily   f/u with Dr. Junior - primary pulm     no h/o nodule - will need out patient ct imaging after discharge

## 2025-02-03 NOTE — PROGRESS NOTE ADULT - ASSESSMENT
89 year-old female with a PMH of HLD, paroxysmal Afib (not on AC at home, only on BB), SVT, rheumatoid arthritis, and Mycobacterium avium complex   with Gait Instability, ADL impairments and Functional impairments.    #CVA  -Continue statin, Eliquis   - PT/OT/SLP  -Follow up with PCP and Neurologist in 1 week from discharge from rehab    #Pre-diabetes   -diet control  -Follow up with PCP after dc from rehab    # Atrial fibrillation   - Eliquis   - atenolol   -Follow up with cardiologist 1 week after dc from rehab    #met SIRS criteria at referring hospital (WBC 16 with tachycardia)-resolved   -S/p Rocephin IV  -infectious w/u did not reveal acute infection     #history of Mycobacterium avium complex  -sats noted to be 85 to 86% on RA, improved to 92% with 2L NC.   -CTA chest 1/24 - Bilateral pleural effusions and lower lobe passive atelectasis. Bilateral groundglass opacities within the upper lobes, lingula, and lower lobes and centrilobular nodules are of uncertain etilogy.     The right lower lobe ill-defined 1.6 x 0.8 cm opacity.  - Repeat CT chest without contrast is recommended in 8- weeks to evaluate for resolution/change.  - given elevated BNP, trial of diuretics  - change Asmanex to budesonide nebulizer and albuterol   - prn albuterol   - supplement O2 PRN   - seen by pulmo, recommendations reviewed.    #RA  -on Xeljanz at home, not on formulary at [facility], family will need to bring home supply     # depression- Lexapro    #DVT  - Eliquis

## 2025-02-04 LAB
ALBUMIN SERPL ELPH-MCNC: 2.3 G/DL — LOW (ref 3.3–5)
ALP SERPL-CCNC: 97 U/L — SIGNIFICANT CHANGE UP (ref 40–120)
ALT FLD-CCNC: 20 U/L — SIGNIFICANT CHANGE UP (ref 10–45)
ANION GAP SERPL CALC-SCNC: 10 MMOL/L — SIGNIFICANT CHANGE UP (ref 5–17)
APPEARANCE UR: CLEAR — SIGNIFICANT CHANGE UP
AST SERPL-CCNC: 22 U/L — SIGNIFICANT CHANGE UP (ref 10–40)
BASOPHILS # BLD AUTO: 0.04 K/UL — SIGNIFICANT CHANGE UP (ref 0–0.2)
BASOPHILS NFR BLD AUTO: 0.5 % — SIGNIFICANT CHANGE UP (ref 0–2)
BILIRUB SERPL-MCNC: 1.1 MG/DL — SIGNIFICANT CHANGE UP (ref 0.2–1.2)
BILIRUB UR-MCNC: NEGATIVE — SIGNIFICANT CHANGE UP
BUN SERPL-MCNC: 16 MG/DL — SIGNIFICANT CHANGE UP (ref 7–23)
CALCIUM SERPL-MCNC: 8.7 MG/DL — SIGNIFICANT CHANGE UP (ref 8.4–10.5)
CHLORIDE SERPL-SCNC: 96 MMOL/L — SIGNIFICANT CHANGE UP (ref 96–108)
CO2 SERPL-SCNC: 28 MMOL/L — SIGNIFICANT CHANGE UP (ref 22–31)
COLOR SPEC: YELLOW — SIGNIFICANT CHANGE UP
CREAT SERPL-MCNC: 0.61 MG/DL — SIGNIFICANT CHANGE UP (ref 0.5–1.3)
DIFF PNL FLD: NEGATIVE — SIGNIFICANT CHANGE UP
EGFR: 84 ML/MIN/1.73M2 — SIGNIFICANT CHANGE UP
EOSINOPHIL # BLD AUTO: 0.09 K/UL — SIGNIFICANT CHANGE UP (ref 0–0.5)
EOSINOPHIL NFR BLD AUTO: 1.1 % — SIGNIFICANT CHANGE UP (ref 0–6)
GLUCOSE SERPL-MCNC: 132 MG/DL — HIGH (ref 70–99)
GLUCOSE UR QL: NEGATIVE MG/DL — SIGNIFICANT CHANGE UP
HCT VFR BLD CALC: 36.6 % — SIGNIFICANT CHANGE UP (ref 34.5–45)
HGB BLD-MCNC: 12.2 G/DL — SIGNIFICANT CHANGE UP (ref 11.5–15.5)
IMM GRANULOCYTES NFR BLD AUTO: 0.5 % — SIGNIFICANT CHANGE UP (ref 0–0.9)
KETONES UR-MCNC: NEGATIVE MG/DL — SIGNIFICANT CHANGE UP
LEUKOCYTE ESTERASE UR-ACNC: NEGATIVE — SIGNIFICANT CHANGE UP
LYMPHOCYTES # BLD AUTO: 0.51 K/UL — LOW (ref 1–3.3)
LYMPHOCYTES # BLD AUTO: 6.1 % — LOW (ref 13–44)
MCHC RBC-ENTMCNC: 32.1 PG — SIGNIFICANT CHANGE UP (ref 27–34)
MCHC RBC-ENTMCNC: 33.3 G/DL — SIGNIFICANT CHANGE UP (ref 32–36)
MCV RBC AUTO: 96.3 FL — SIGNIFICANT CHANGE UP (ref 80–100)
MONOCYTES # BLD AUTO: 0.51 K/UL — SIGNIFICANT CHANGE UP (ref 0–0.9)
MONOCYTES NFR BLD AUTO: 6.1 % — SIGNIFICANT CHANGE UP (ref 2–14)
NEUTROPHILS # BLD AUTO: 7.23 K/UL — SIGNIFICANT CHANGE UP (ref 1.8–7.4)
NEUTROPHILS NFR BLD AUTO: 85.7 % — HIGH (ref 43–77)
NITRITE UR-MCNC: NEGATIVE — SIGNIFICANT CHANGE UP
NRBC # BLD: 0 /100 WBCS — SIGNIFICANT CHANGE UP (ref 0–0)
NRBC BLD-RTO: 0 /100 WBCS — SIGNIFICANT CHANGE UP (ref 0–0)
PH UR: 5 — SIGNIFICANT CHANGE UP (ref 5–8)
PLATELET # BLD AUTO: 251 K/UL — SIGNIFICANT CHANGE UP (ref 150–400)
POTASSIUM SERPL-MCNC: 3.7 MMOL/L — SIGNIFICANT CHANGE UP (ref 3.5–5.3)
POTASSIUM SERPL-SCNC: 3.7 MMOL/L — SIGNIFICANT CHANGE UP (ref 3.5–5.3)
PROCALCITONIN SERPL-MCNC: 0.06 NG/ML — SIGNIFICANT CHANGE UP
PROT SERPL-MCNC: 7.6 G/DL — SIGNIFICANT CHANGE UP (ref 6–8.3)
PROT UR-MCNC: NEGATIVE MG/DL — SIGNIFICANT CHANGE UP
RBC # BLD: 3.8 M/UL — SIGNIFICANT CHANGE UP (ref 3.8–5.2)
RBC # FLD: 14.2 % — SIGNIFICANT CHANGE UP (ref 10.3–14.5)
SODIUM SERPL-SCNC: 134 MMOL/L — LOW (ref 135–145)
SP GR SPEC: 1.01 — SIGNIFICANT CHANGE UP (ref 1–1.03)
UROBILINOGEN FLD QL: 0.2 MG/DL — SIGNIFICANT CHANGE UP (ref 0.2–1)
WBC # BLD: 8.42 K/UL — SIGNIFICANT CHANGE UP (ref 3.8–10.5)
WBC # FLD AUTO: 8.42 K/UL — SIGNIFICANT CHANGE UP (ref 3.8–10.5)

## 2025-02-04 PROCEDURE — 70450 CT HEAD/BRAIN W/O DYE: CPT | Mod: 26

## 2025-02-04 PROCEDURE — 93010 ELECTROCARDIOGRAM REPORT: CPT

## 2025-02-04 PROCEDURE — 71045 X-RAY EXAM CHEST 1 VIEW: CPT | Mod: 26

## 2025-02-04 PROCEDURE — 99232 SBSQ HOSP IP/OBS MODERATE 35: CPT

## 2025-02-04 PROCEDURE — 99232 SBSQ HOSP IP/OBS MODERATE 35: CPT | Mod: GC

## 2025-02-04 RX ADMIN — Medication 20 MILLIGRAM(S): at 07:01

## 2025-02-04 RX ADMIN — Medication 2 TABLET(S): at 22:11

## 2025-02-04 RX ADMIN — ACETAMINOPHEN, DIPHENHYDRAMINE HCL, PHENYLEPHRINE HCL 3 MILLIGRAM(S): 325; 25; 5 TABLET ORAL at 22:12

## 2025-02-04 RX ADMIN — Medication 1 APPLICATION(S): at 07:01

## 2025-02-04 RX ADMIN — Medication 2.5 MILLIGRAM(S): at 22:19

## 2025-02-04 RX ADMIN — PANTOPRAZOLE 40 MILLIGRAM(S): 20 TABLET, DELAYED RELEASE ORAL at 07:01

## 2025-02-04 RX ADMIN — NYSTATIN 1 APPLICATION(S): 100000 POWDER TOPICAL at 07:01

## 2025-02-04 RX ADMIN — ATORVASTATIN CALCIUM 40 MILLIGRAM(S): 80 TABLET, FILM COATED ORAL at 22:12

## 2025-02-04 RX ADMIN — Medication 2.5 MILLIGRAM(S): at 09:07

## 2025-02-04 RX ADMIN — NYSTATIN 1 APPLICATION(S): 100000 POWDER TOPICAL at 17:16

## 2025-02-04 RX ADMIN — APIXABAN 2.5 MILLIGRAM(S): 5 TABLET, FILM COATED ORAL at 07:00

## 2025-02-04 RX ADMIN — Medication 1 APPLICATION(S): at 17:16

## 2025-02-04 RX ADMIN — Medication 25 MILLIGRAM(S): at 07:00

## 2025-02-04 RX ADMIN — ESCITALOPRAM 20 MILLIGRAM(S): 10 TABLET, FILM COATED ORAL at 11:26

## 2025-02-04 RX ADMIN — BUDESONIDE 0.5 MILLIGRAM(S): 9 TABLET, EXTENDED RELEASE ORAL at 09:06

## 2025-02-04 RX ADMIN — Medication 2.5 MILLIGRAM(S): at 05:34

## 2025-02-04 RX ADMIN — APIXABAN 2.5 MILLIGRAM(S): 5 TABLET, FILM COATED ORAL at 17:16

## 2025-02-04 RX ADMIN — BUDESONIDE 0.5 MILLIGRAM(S): 9 TABLET, EXTENDED RELEASE ORAL at 22:19

## 2025-02-04 NOTE — PROGRESS NOTE ADULT - ASSESSMENT
89 year-old female with a PMH of HLD, paroxysmal Afib (not on AC at home, only on BB), SVT, rheumatoid arthritis, and Mycobacterium avium complex   with Gait Instability, ADL impairments and Functional impairments.      #Gait Instability, ADL impairments and Functional impairments  - Comprehensive Rehab Program of PT/OT/SLP    #CVA  -Continue statin  - CT head today follow drowsiness/lethargy this am  -on eliquis 2.5mg BID for Pafib- EKG today for tachycardia at rest-hospitalist to follow up  - [facility] completed-diet adjusted  -Follow up with your PCP and Neurologist in 1 week from discharge from rehab    #Endo: HbA1C: 5.8   -Monitor fasting glucose levels on routine labs   -Follow up with PCP after dc from rehab    # Atrial fibrillation   -Continue AC with Eliquis   -mild tachy today-on atenolol- decreased prior weekend after lasix addition-hospitalist in- f/up  -Follow up with cardiologist    #SIRS at referring hospital (WBC 16 with tachycardia)  - workup sent today-hospitalist to follow up     #history of Mycobacterium avium complex  -CTA chest 1/24--> Bilateral pleural effusions and lower lobe passive atelectasis. Bilateral groundglass opacities within the upper lobes, lingula, and lower lobes and centrilobular nodules are of uncertain etilogy. The right lower lobe ill-defined 1.6 x 0.8 cm opacity.  -Repeat CT chest without contrast is recommended in 8- weeks to evaluate for resolution/change.  -prn albuterol inhaler   - Supplemental O2. Monitor resp status.   - Pulmonology recs 2/1: elevated BUN, trial diuretics, , taper O2    #RA  -on Xeljanz at home, not on formulary at [facility], family will need to bring home supply     #Pain control  - Tylenol PRN    #GI/Bowel Mgmt   - Continue Senna at bedtime   - Miralax prn   - Protonix for GI ppx while hospitalized    #Bladder management  -no retention reported, continent- UA today, r/o UTI  - toileting schedule    #BLE calf pain  - Duplex US-on Eliquis- US 1/28-NEG    #Skin:  - stage 1 pressure ulcer sacrum  - turn and position q2h   - Nystatin BID    FEN   - Diet - DASH   - Dysphagia  SLP - evaluation and treatment- [facility] 1/29    Precautions / PROPHYLAXIS:   - Falls, Cardiac, Seizure   - Lungs: Aspiration, Incentive Spirometer   - Pressure injury/Skin: Turn Q2hrs while in bed, OOB to Chair, PT/OT        Follow up with these providers:   General neurology at [address] [place] [address],  1-2 weeks after discharge. Please instruct the patient to call [phone]  to schedule this appointment.    PCP Dr Rhodes

## 2025-02-04 NOTE — PROGRESS NOTE ADULT - NS ATTEND AMEND GEN_ALL_CORE FT
Participating in therapy   on  trial of diuretics  CXR pending  wean O2
had some lethargy this am - w/u so far negative  UA, WBC, head CT negative
##CVA  -Continue statin  -on eliquis 2.5mg BID for Pafib  - MBS today  -Follow up with your PCP and Neurologist in 1 week from discharge from rehab      #SIRS at referring hospital (WBC 16 with tachycardia)-resolved   -S/o Rocephin IV  -infectious w/u did not ret reveal acute infection     #history of Mycobacterium avium complex  -CTA chest 1/24--> Bilateral pleural effusions and lower lobe passive atelectasis. Bilateral groundglass opacities within the upper lobes, lingula, and lower lobes and centrilobular nodules are of uncertain etilogy. The right lower lobe ill-defined 1.6 x 0.8 cm opacity.  -Repeat CT chest without contrast is recommended in 8- weeks to evaluate for resolution/change.  -prn albuterol inhaler   -o2 sats between 88-91% on RA on admission---> 2L NC, sats 82% RA in therapy.  CXR completed- pending read
Patient doing well  Discussed in IDR, team conference   Participating in therapy   NO acute events overnight  Continue current management
cough not improving - pulm following   limited progress in therapy   DC planning with 24 h aide

## 2025-02-04 NOTE — PROGRESS NOTE ADULT - SUBJECTIVE AND OBJECTIVE BOX
PROGRESS NOTE:     Patient is a 89y old  Female who presents with a chief complaint of CVA (31 Jan 2025 13:48)          SUBJECTIVE & OBJECTIVE:   Pt seen and examined at bedside in AM    no overnight events.       REVIEW OF SYSTEMS: remaining ROS negative     PHYSICAL EXAM:  VITALS:  Vital Signs Last 24 Hrs  T(C): 36.4 (04 Feb 2025 07:31), Max: 36.9 (03 Feb 2025 22:03)  T(F): 97.5 (04 Feb 2025 07:31), Max: 98.4 (03 Feb 2025 22:03)  HR: 103 (04 Feb 2025 08:21) (99 - 105)  BP: 121/60 (04 Feb 2025 08:21) (121/60 - 164/88)  BP(mean): --  RR: 16 (04 Feb 2025 07:31) (16 - 18)  SpO2: 93% (04 Feb 2025 07:31) (88% - 96%)    Parameters below as of 04 Feb 2025 07:31  Patient On (Oxygen Delivery Method): nasal cannula  O2 Flow (L/min):         GENERAL: NAD,  no increased WOB  HEAD:  Atraumatic, Normocephalic  EYES: EOMI, conjunctiva and sclera clear  ENMT: Moist mucous membranes  NECK: Supple, No JVD  NERVOUS SYSTEM:  Alert   CHEST/LUNG: Clear to auscultation bilaterally; No rales, rhonchi, wheezing  HEART: Regular rate and rhythm  ABDOMEN: Soft, Nontender, Nondistended; Bowel sounds present  EXTREMITIES:  No clubbing, cyanosis, calf tenderness or edema b/l      Vital Signs Last 24 Hrs  T(C): 36.4 (04 Feb 2025 07:31), Max: 36.9 (03 Feb 2025 22:03)  T(F): 97.5 (04 Feb 2025 07:31), Max: 98.4 (03 Feb 2025 22:03)  HR: 103 (04 Feb 2025 08:21) (99 - 105)  BP: 121/60 (04 Feb 2025 08:21) (121/60 - 164/88)  BP(mean): --  RR: 16 (04 Feb 2025 07:31) (16 - 18)  SpO2: 93% (04 Feb 2025 07:31) (88% - 96%)    Parameters below as of 04 Feb 2025 07:31  Patient On (Oxygen Delivery Method): nasal cannula  O2 Flow (L/min): 1          Allergies    Cardizem (Rash)  Haider (Unknown)    Intolerances              LABS:                           13.1   6.94  )-----------( 270      ( 03 Feb 2025 06:07 )             41.0     02-03    134[L]  |  99  |  12  ----------------------------<  108[H]  4.5   |  29  |  0.70    Ca    9.0      03 Feb 2025 06:07    TPro  7.8  /  Alb  2.3[L]  /  TBili  1.0  /  DBili  x   /  AST  24  /  ALT  18  /  AlkPhos  101  02-03    LIVER FUNCTIONS - ( 03 Feb 2025 06:07 )  Alb: 2.3 g/dL / Pro: 7.8 g/dL / ALK PHOS: 101 U/L / ALT: 18 U/L / AST: 24 U/L / GGT: x             Urinalysis Basic - ( 03 Feb 2025 06:07 )    Color: x / Appearance: x / SG: x / pH: x  Gluc: 108 mg/dL / Ketone: x  / Bili: x / Urobili: x   Blood: x / Protein: x / Nitrite: x   Leuk Esterase: x / RBC: x / WBC x   Sq Epi: x / Non Sq Epi: x / Bacteria: x                CAPILLARY BLOOD GLUCOSE                    RECENT CULTURES:          RADIOLOGY & ADDITIONAL TESTS:       PROGRESS NOTE:     Patient is a 89y old  Female who presents with a chief complaint of CVA (31 Jan 2025 13:48)          SUBJECTIVE & OBJECTIVE:   Pt seen and examined at bedside in AM. noted to be confused this AM. VSS. will check labwork to r/o infectious process   no overnight events.       REVIEW OF SYSTEMS: remaining ROS negative     PHYSICAL EXAM:  VITALS:  Vital Signs Last 24 Hrs  T(C): 36.4 (04 Feb 2025 07:31), Max: 36.9 (03 Feb 2025 22:03)  T(F): 97.5 (04 Feb 2025 07:31), Max: 98.4 (03 Feb 2025 22:03)  HR: 103 (04 Feb 2025 08:21) (99 - 105)  BP: 121/60 (04 Feb 2025 08:21) (121/60 - 164/88)  BP(mean): --  RR: 16 (04 Feb 2025 07:31) (16 - 18)  SpO2: 93% (04 Feb 2025 07:31) (88% - 96%)    Parameters below as of 04 Feb 2025 07:31  Patient On (Oxygen Delivery Method): nasal cannula  O2 Flow (L/min):         GENERAL: NAD,  no increased WOB  HEAD:  Atraumatic, Normocephalic  EYES: EOMI, conjunctiva and sclera clear  ENMT: Moist mucous membranes  NECK: Supple, No JVD  NERVOUS SYSTEM:  Alert   CHEST/LUNG: Clear to auscultation bilaterally; No rales, rhonchi, wheezing  HEART: Regular rate and rhythm  ABDOMEN: Soft, Nontender, Nondistended; Bowel sounds present  EXTREMITIES:  No clubbing, cyanosis, calf tenderness or edema b/l      Vital Signs Last 24 Hrs  T(C): 36.4 (04 Feb 2025 07:31), Max: 36.9 (03 Feb 2025 22:03)  T(F): 97.5 (04 Feb 2025 07:31), Max: 98.4 (03 Feb 2025 22:03)  HR: 103 (04 Feb 2025 08:21) (99 - 105)  BP: 121/60 (04 Feb 2025 08:21) (121/60 - 164/88)  BP(mean): --  RR: 16 (04 Feb 2025 07:31) (16 - 18)  SpO2: 93% (04 Feb 2025 07:31) (88% - 96%)    Parameters below as of 04 Feb 2025 07:31  Patient On (Oxygen Delivery Method): nasal cannula  O2 Flow (L/min): 1          Allergies    Cardizem (Rash)  [place]ley (Unknown)    Intolerances              LABS:                           13.1   6.94  )-----------( 270      ( 03 Feb 2025 06:07 )             41.0     02-03    134[L]  |  99  |  12  ----------------------------<  108[H]  4.5   |  29  |  0.70    Ca    9.0      03 Feb 2025 06:07    TPro  7.8  /  Alb  2.3[L]  /  TBili  1.0  /  DBili  x   /  AST  24  /  ALT  18  /  AlkPhos  101  02-03    LIVER FUNCTIONS - ( 03 Feb 2025 06:07 )  Alb: 2.3 g/dL / Pro: 7.8 g/dL / ALK PHOS: 101 U/L / ALT: 18 U/L / AST: 24 U/L / GGT: x             Urinalysis Basic - ( 03 Feb 2025 06:07 )    Color: x / Appearance: x / SG: x / pH: x  Gluc: 108 mg/dL / Ketone: x  / Bili: x / Urobili: x   Blood: x / Protein: x / Nitrite: x   Leuk Esterase: x / RBC: x / WBC x   Sq Epi: x / Non Sq Epi: x / Bacteria: x                CAPILLARY BLOOD GLUCOSE                    RECENT CULTURES:          RADIOLOGY & ADDITIONAL TESTS:

## 2025-02-04 NOTE — PROGRESS NOTE ADULT - SUBJECTIVE AND OBJECTIVE BOX
HPI:  Patient is a 89 year old female with a PMH of HLD, paroxysmal Afib (not on AC at home, only on BB), SVT, rheumatoid arthritis, and Mycobacterium avium complex admitted to [facility] for acute encephalopathy and subacute CVA on 1/16/25. She had been found on the floor with AMS by her niece, who called EMS.   On arrival to ED,  patient had EKG which showed sinus tachycardia with RBB. Initial non con CTH with acute Vs subacute infarcts in Right BG and age indeterminate infarct in Left occipital lobe. Pt admitted for further work up and management of encephalopathy. Neuro consulted for stroke W/U. She was outside the window for TNK or antithrombotic on arrival to ED.  Patient was treated with aspirin suppository in the ED and admitted to tele.  Patient was monitored on tele monitor and no acute arrythmia were noted.  MRB w/ acute to subacute infarcts, CTA H/N w/no LVO or HGS and TTE w/ no evident potential embolic source    Of note, patient also found to meet SIRS sepsis criteria and completed course of antibiotics. Work-up remained negative for acute infection.    Patient was seen by PT with recommendations for acute IPR. Patient was deemed medially stable on 1/27/25 and transferred to [facility] for acute IPR.       NAD overnight, drowsy/lethargic this am. No new weakness.   HR>100. T 99.7 this am. Requesting EKG, labs/UA and CTH f/up. Will sign out for hospitalist f/up.  Wheezing less- pulmonary in, respi tx continues. NC in place 1-2 L.   No HA, no chest pain, no palpitations.  On Eliquis-no overt bleeding.  Melatonin at bedtime  s/p MBS      MEDICATIONS  (STANDING):  albuterol    0.083% 2.5 milliGRAM(s) Nebulizer every 6 hours  ammonium lactate 12% Lotion 1 Application(s) Topical two times a day  apixaban 2.5 milliGRAM(s) Oral every 12 hours  atenolol  Tablet 25 milliGRAM(s) Oral daily  atorvastatin 40 milliGRAM(s) Oral at bedtime  buDESOnide    Inhalation Suspension 0.5 milliGRAM(s) Inhalation every 12 hours  escitalopram 20 milliGRAM(s) Oral daily  furosemide    Tablet 20 milliGRAM(s) Oral daily  melatonin 3 milliGRAM(s) Oral at bedtime  nystatin Powder 1 Application(s) Topical two times a day  pantoprazole    Tablet 40 milliGRAM(s) Oral before breakfast  senna 2 Tablet(s) Oral at bedtime    MEDICATIONS  (PRN):  acetaminophen     Tablet .. 650 milliGRAM(s) Oral every 6 hours PRN Mild Pain (1 - 3), Moderate Pain (4 - 6), Severe Pain (7 - 10)  albuterol    90 MICROgram(s) HFA Inhaler 2 Puff(s) Inhalation every 6 hours PRN Shortness of Breath and/or Wheezing  polyethylene glycol 3350 17 Gram(s) Oral daily PRN Constipation                            12.2   8.42  )-----------( 251      ( 04 Feb 2025 10:10 )             36.6     02-03    134[L]  |  99  |  12  ----------------------------<  108[H]  4.5   |  29  |  0.70    Ca    9.0      03 Feb 2025 06:07    TPro  7.8  /  Alb  2.3[L]  /  TBili  1.0  /  DBili  x   /  AST  24  /  ALT  18  /  AlkPhos  101  02-03    Urinalysis Basic - ( 03 Feb 2025 06:07 )    Color: x / Appearance: x / SG: x / pH: x  Gluc: 108 mg/dL / Ketone: x  / Bili: x / Urobili: x   Blood: x / Protein: x / Nitrite: x   Leuk Esterase: x / RBC: x / WBC x   Sq Epi: x / Non Sq Epi: x / Bacteria: x      Vital Signs Last 24 Hrs  T(C): 36.4 (04 Feb 2025 07:31), Max: 36.9 (03 Feb 2025 22:03)  T(F): 97.5 (04 Feb 2025 07:31), Max: 98.4 (03 Feb 2025 22:03)  HR: 103 (04 Feb 2025 08:21) (99 - 105)  BP: 121/60 (04 Feb 2025 08:21) (121/60 - 164/88)  BP(mean): --  RR: 16 (04 Feb 2025 07:31) (16 - 18)  SpO2: 93% (04 Feb 2025 07:31) (88% - 96%)    Parameters below as of 04 Feb 2025 07:31  Patient On (Oxygen Delivery Method): nasal cannula  O2 Flow (L/min): 1       Gen - frail appearing, drowsy  HEENT - EOMI, PERRLA, Normal Conjunctivae  Neck - Supple  Pulm - crackles, diminished   Cardiovascular - irregular  Abdomen - Soft, NT/ND, +BS  Extremities -  BLE trace edema  Neuro-     Cognitive - drowsy, awaken to voice/sternal rub, oriented to person, place and year. Follows commands.     Communication - Fluent, +Hypophonic     Cranial Nerves - EOMI, no nystagmus, SAL, equal pupils, face symmetrical, no sensation loss.      Motor -                     LEFT    UE - ShAB 5/5, EF 5/5, EE 3/5,  4/5                    RIGHT UE - ShAB 5/5, EF 5/5, EE 3/5,   4/5                    LEFT    LE - HF 5/5, KE 5/5, DF 5/5, PF 5/5                    RIGHT LE - HF 5/5, KE 5/5, DF 5/5, PF 5/5        Sensory - Intact  to LT  Psychiatric - Mood stable, Affect WNL  Skin:  Generalized ecchymosis/scabbing, facial blanchable redness, b/l elbows blanchable redness, R hand middle finger abrasion healed, moisture associated dermatitis groin, L hip healed abrasion, stage 1 sacral pressure injury        Continue comprehensive acute rehab program consisting of 3hrs/day of OT/PT and SLP.

## 2025-02-05 PROCEDURE — 99232 SBSQ HOSP IP/OBS MODERATE 35: CPT

## 2025-02-05 PROCEDURE — 99232 SBSQ HOSP IP/OBS MODERATE 35: CPT | Mod: GC

## 2025-02-05 RX ADMIN — Medication 2.5 MILLIGRAM(S): at 21:47

## 2025-02-05 RX ADMIN — BUDESONIDE 0.5 MILLIGRAM(S): 9 TABLET, EXTENDED RELEASE ORAL at 21:47

## 2025-02-05 RX ADMIN — BUDESONIDE 0.5 MILLIGRAM(S): 9 TABLET, EXTENDED RELEASE ORAL at 08:42

## 2025-02-05 RX ADMIN — NYSTATIN 1 APPLICATION(S): 100000 POWDER TOPICAL at 06:43

## 2025-02-05 RX ADMIN — Medication 1 APPLICATION(S): at 06:43

## 2025-02-05 RX ADMIN — Medication 2.5 MILLIGRAM(S): at 15:01

## 2025-02-05 RX ADMIN — ESCITALOPRAM 20 MILLIGRAM(S): 10 TABLET, FILM COATED ORAL at 12:25

## 2025-02-05 RX ADMIN — APIXABAN 2.5 MILLIGRAM(S): 5 TABLET, FILM COATED ORAL at 06:42

## 2025-02-05 RX ADMIN — PANTOPRAZOLE 40 MILLIGRAM(S): 20 TABLET, DELAYED RELEASE ORAL at 06:42

## 2025-02-05 RX ADMIN — NYSTATIN 1 APPLICATION(S): 100000 POWDER TOPICAL at 18:04

## 2025-02-05 RX ADMIN — Medication 1 APPLICATION(S): at 18:04

## 2025-02-05 RX ADMIN — Medication 2.5 MILLIGRAM(S): at 08:42

## 2025-02-05 RX ADMIN — Medication 25 MILLIGRAM(S): at 06:42

## 2025-02-05 RX ADMIN — ATORVASTATIN CALCIUM 40 MILLIGRAM(S): 80 TABLET, FILM COATED ORAL at 21:18

## 2025-02-05 RX ADMIN — APIXABAN 2.5 MILLIGRAM(S): 5 TABLET, FILM COATED ORAL at 18:04

## 2025-02-05 NOTE — PROGRESS NOTE ADULT - ASSESSMENT
89 year-old female with a PMH of HLD, paroxysmal Afib (not on AC at home, only on BB), SVT, rheumatoid arthritis, and Mycobacterium avium complex   with Gait Instability, ADL impairments and Functional impairments.      #Gait Instability, ADL impairments and Functional impairments  - Comprehensive Rehab Program of PT/OT/SLP    #CVA  -Continue statin  - CT head today follow drowsiness/lethargy this am  -on eliquis 2.5mg BID for Pafib- EKG today for tachycardia at rest-hospitalist to follow up  - [facility] completed-diet adjusted  -Follow up with your PCP and Neurologist in 1 week from discharge from rehab    #Endo: HbA1C: 5.8   -Monitor fasting glucose levels on routine labs   -Follow up with PCP after dc from rehab    # Atrial fibrillation   -Continue AC with Eliquis   -mild tachy today-on atenolol- decreased prior weekend after lasix addition-hospitalist in- f/up  -Follow up with cardiologist    #SIRS at referring hospital (WBC 16 with tachycardia)  - workup sent today-hospitalist to follow up     #history of Mycobacterium avium complex  -CTA chest 1/24--> Bilateral pleural effusions and lower lobe passive atelectasis. Bilateral groundglass opacities within the upper lobes, lingula, and lower lobes and centrilobular nodules are of uncertain etilogy. The right lower lobe ill-defined 1.6 x 0.8 cm opacity.  -Repeat CT chest without contrast is recommended in 8- weeks to evaluate for resolution/change.  -prn albuterol inhaler   - Supplemental O2. Monitor resp status.   - Pulmonology recs 2/1: elevated BUN, trial diuretics, , taper O2    #RA  -on Xeljanz at home, not on formulary at [facility], family will need to bring home supply     #Pain control  - Tylenol PRN    #GI/Bowel Mgmt   - Continue Senna at bedtime   - Miralax prn   - Protonix for GI ppx while hospitalized    #Bladder management  -no retention reported, continent- UA today, r/o UTI  - toileting schedule    #BLE calf pain  - Duplex US-on Eliquis- US 1/28-NEG    #Skin:  - stage 1 pressure ulcer sacrum  - turn and position q2h   - Nystatin BID    FEN   - Diet - DASH   - Dysphagia  SLP - evaluation and treatment- [facility] 1/29    Precautions / PROPHYLAXIS:   - Falls, Cardiac, Seizure   - Lungs: Aspiration, Incentive Spirometer   - Pressure injury/Skin: Turn Q2hrs while in bed, OOB to Chair, PT/OT        Follow up with these providers:   General neurology at [address] [place] [address],  1-2 weeks after discharge. Please instruct the patient to call [phone]  to schedule this appointment.    PCP Dr Rhodes

## 2025-02-05 NOTE — PROGRESS NOTE ADULT - ASSESSMENT
Physical Examination:  GENERAL:               Alert, Oriented, No acute distress.    HEENT:                     No JVD, dry MM  PULM:                     Bilateral air entry, dimminished to auscultation bilaterally, no significant sputum production, No Rales, No Rhonchi, No Wheezing  CVS:                         S1, S2,  No Murmur  ABD:                        Soft, nondistended, nontender, normoactive bowel sounds,   Vascular:                Cool Extremities,    NEURO:                  Alert, oriented, interactive,  follows commands  PSYC:                      Calm, + Insight. poor hisotrina          Assessment  1. Hypoxia unsure cause, but improving today   2. Abnormal CT chest - unsure if acute changes or chronic - h/o JACINTA  3.Afib on eliquis with recent stroke  4. Underlying H/o RA xeljanz , DVT      Plan  Elevated BNP - will give trial of diuretics  will continue 3 more days of lasix  chronic bronchiectasis  f/u official cxr read   Continue nebs  currently on n/c o2 with goal to  taper to off   if able  check o2 sat on room air rest/exertion daily   f/u with Dr. Junior - primary pulm     no h/o nodule - will need out patient ct imaging after discharge

## 2025-02-05 NOTE — PROGRESS NOTE ADULT - SUBJECTIVE AND OBJECTIVE BOX
Follow-up Pulmonary Progress Note  Chief Complaint : Cerebral infarction        pt seen and examined in ot  sat 95 % on 2 L n/c  yesterday had episode of hypxoia   unable to be weaned from o2  pt has no pulmonary complaints    Allergies :Cardizem (Rash)  [place] (Unknown)      PAST MEDICAL & SURGICAL HISTORY:  Atrial fibrillation    Rheumatoid arthritis    Paroxysmal atrial fibrillation    HTN (hypertension)    JACINTA (mycobacterium avium-intracellulare)    No significant past surgical history        Medications:  MEDICATIONS  (STANDING):  albuterol    0.083% 2.5 milliGRAM(s) Nebulizer every 6 hours  ammonium lactate 12% Lotion 1 Application(s) Topical two times a day  apixaban 2.5 milliGRAM(s) Oral every 12 hours  atenolol  Tablet 25 milliGRAM(s) Oral daily  atorvastatin 40 milliGRAM(s) Oral at bedtime  buDESOnide    Inhalation Suspension 0.5 milliGRAM(s) Inhalation every 12 hours  escitalopram 20 milliGRAM(s) Oral daily  melatonin 3 milliGRAM(s) Oral at bedtime  nystatin Powder 1 Application(s) Topical two times a day  pantoprazole    Tablet 40 milliGRAM(s) Oral before breakfast  senna 2 Tablet(s) Oral at bedtime    MEDICATIONS  (PRN):  acetaminophen     Tablet .. 650 milliGRAM(s) Oral every 6 hours PRN Mild Pain (1 - 3), Moderate Pain (4 - 6), Severe Pain (7 - 10)  albuterol    90 MICROgram(s) HFA Inhaler 2 Puff(s) Inhalation every 6 hours PRN Shortness of Breath and/or Wheezing  polyethylene glycol 3350 17 Gram(s) Oral daily PRN Constipation      Antibiotics History      Heme Medications   apixaban 2.5 milliGRAM(s) Oral every 12 hours, 01-27-25 @ 20:30      GI Medications  pantoprazole    Tablet 40 milliGRAM(s) Oral before breakfast, 01-27-25 @ 20:31, Routine  polyethylene glycol 3350 17 Gram(s) Oral daily, 01-27-25 @ 20:30, Routine PRN  senna 2 Tablet(s) Oral at bedtime, 01-27-25 @ 20:30, Routine        LABS:                        12.2   8.42  )-----------( 251      ( 04 Feb 2025 10:10 )             36.6     02-04    134[L]  |  96  |  16  ----------------------------<  132[H]  3.7   |  28  |  0.61    Ca    8.7      04 Feb 2025 10:10    TPro  7.6  /  Alb  2.3[L]  /  TBili  1.1  /  DBili  x   /  AST  22  /  ALT  20  /  AlkPhos  97  02-04       RADIOLOGY  CXR:     10/ 4 less vasc congestion, ? right upper lobe haziness       VITALS:  T(C): 36.6 (02-05-25 @ 08:29), Max: 36.7 (02-04-25 @ 20:17)  T(F): 97.8 (02-05-25 @ 08:29), Max: 98 (02-04-25 @ 20:17)  HR: 108 (02-05-25 @ 08:44) (86 - 108)  BP: 144/69 (02-05-25 @ 08:29) (110/61 - 144/69)  BP(mean): --  ABP: --  ABP(mean): --  RR: 16 (02-05-25 @ 08:29) (16 - 17)  SpO2: 97% (02-05-25 @ 08:44) (95% - 97%)  CVP(mm Hg): --  CVP(cm H2O): --    Ins and Outs

## 2025-02-05 NOTE — PROGRESS NOTE ADULT - ASSESSMENT
89 year-old female with a PMH of HLD, paroxysmal Afib (not on AC at home, only on BB), SVT, rheumatoid arthritis, and Mycobacterium avium complex   with Gait Instability, ADL impairments and Functional impairments.    #CVA  -Repeat CT H w/o acute changes  -Continue statin, Eliquis   -PT/OT/SLP  -Follow up with PCP and Neurologist in 1 week from discharge from rehab    #Pre-diabetes   -diet control  -Follow up with PCP after dc from rehab    # Atrial fibrillation   -Eliquis   -atenolol   -Follow up with cardiologist 1 week after dc from rehab    #met SIRS criteria at referring hospital (WBC 16 with tachycardia)-resolved   -S/p Rocephin IV  -infectious w/u did not reveal acute infection     #history of Mycobacterium avium complex  -sats noted to be 85 to 86% on RA, improved to 92% with 2L NC.   -CTA chest 1/24 - Bilateral pleural effusions and lower lobe passive atelectasis. Bilateral groundglass opacities within the upper lobes, lingula, and lower lobes and centrilobular nodules are of uncertain etilogy.     The right lower lobe ill-defined 1.6 x 0.8 cm opacity.  - Repeat CT chest without contrast is recommended in 8- weeks to evaluate for resolution/change.  - given elevated BNP, s/p trial of diuretic. HOLD lasix   - change Asmanex to budesonide nebulizer and albuterol   - prn albuterol   - supplement O2 PRN   - seen by pulmo, recommendations reviewed.    #RA  -on Xeljanz at home, not on formulary at [facility], family will need to bring home supply     # depression- Lexapro    #DVT  - Eliquis

## 2025-02-05 NOTE — PROGRESS NOTE ADULT - SUBJECTIVE AND OBJECTIVE BOX
HPI:  Patient is a 89 year old female with a PMH of HLD, paroxysmal Afib (not on AC at home, only on BB), SVT, rheumatoid arthritis, and Mycobacterium avium complex admitted to [facility] for acute encephalopathy and subacute CVA on 1/16/25. She had been found on the floor with AMS by her niece, who called EMS.   On arrival to ED,  patient had EKG which showed sinus tachycardia with RBB. Initial non con CTH with acute Vs subacute infarcts in Right BG and age indeterminate infarct in Left occipital lobe. Pt admitted for further work up and management of encephalopathy. Neuro consulted for stroke W/U. She was outside the window for TNK or antithrombotic on arrival to ED.  Patient was treated with aspirin suppository in the ED and admitted to tele.  Patient was monitored on tele monitor and no acute arrythmia were noted.  MRB w/ acute to subacute infarcts, CTA H/N w/no LVO or HGS and TTE w/ no evident potential embolic source    Of note, patient also found to meet SIRS sepsis criteria and completed course of antibiotics. Work-up remained negative for acute infection.    Patient was seen by PT with recommendations for acute IPR. Patient was deemed medially stable on 1/27/25 and transferred to [facility] for acute IPR.       NAD overnight, drowsy/lethargic this am. No new weakness.         MEDICATIONS  (STANDING):  albuterol    0.083% 2.5 milliGRAM(s) Nebulizer every 6 hours  ammonium lactate 12% Lotion 1 Application(s) Topical two times a day  apixaban 2.5 milliGRAM(s) Oral every 12 hours  atenolol  Tablet 25 milliGRAM(s) Oral daily  atorvastatin 40 milliGRAM(s) Oral at bedtime  buDESOnide    Inhalation Suspension 0.5 milliGRAM(s) Inhalation every 12 hours  escitalopram 20 milliGRAM(s) Oral daily  furosemide    Tablet 20 milliGRAM(s) Oral daily  melatonin 3 milliGRAM(s) Oral at bedtime  nystatin Powder 1 Application(s) Topical two times a day  pantoprazole    Tablet 40 milliGRAM(s) Oral before breakfast  senna 2 Tablet(s) Oral at bedtime    MEDICATIONS  (PRN):  acetaminophen     Tablet .. 650 milliGRAM(s) Oral every 6 hours PRN Mild Pain (1 - 3), Moderate Pain (4 - 6), Severe Pain (7 - 10)  albuterol    90 MICROgram(s) HFA Inhaler 2 Puff(s) Inhalation every 6 hours PRN Shortness of Breath and/or Wheezing  polyethylene glycol 3350 17 Gram(s) Oral daily PRN Constipation       Vital Signs Last 24 Hrs  T(C): 36.6 (05 Feb 2025 08:29), Max: 36.7 (04 Feb 2025 20:17)  T(F): 97.8 (05 Feb 2025 08:29), Max: 98 (04 Feb 2025 20:17)  HR: 90 (05 Feb 2025 15:03) (86 - 108)  BP: 144/69 (05 Feb 2025 08:29) (110/61 - 144/69)  BP(mean): --  RR: 16 (05 Feb 2025 08:29) (16 - 17)  SpO2: 96% (05 Feb 2025 15:03) (95% - 97%)    Parameters below as of 05 Feb 2025 15:03  Patient On (Oxygen Delivery Method): nasal cannula           Gen - frail appearing, drowsy  HEENT - EOMI, PERRLA, Normal Conjunctivae  Neck - Supple  Pulm - crackles, diminished   Cardiovascular - irregular  Abdomen - Soft, NT/ND, +BS  Extremities -  BLE trace edema  Neuro-     Cognitive - drowsy, awaken to voice/sternal rub, oriented to person, place and year. Follows commands.     Communication - Fluent, +Hypophonic     Cranial Nerves - EOMI, no nystagmus, SAL, equal pupils, face symmetrical, no sensation loss.      Motor -                     LEFT    UE - ShAB 5/5, EF 5/5, EE 3/5,  4/5                    RIGHT UE - ShAB 5/5, EF 5/5, EE 3/5,   4/5                    LEFT    LE - HF 5/5, KE 5/5, DF 5/5, PF 5/5                    RIGHT LE - HF 5/5, KE 5/5, DF 5/5, PF 5/5        Sensory - Intact  to LT  Psychiatric - Mood stable, Affect WNL  Skin:  Generalized ecchymosis/scabbing, facial blanchable redness, b/l elbows blanchable redness, R hand middle finger abrasion healed, moisture associated dermatitis groin, L hip healed abrasion, stage 1 sacral pressure injury        Continue comprehensive acute rehab program consisting of 3hrs/day of OT/PT and SLP.

## 2025-02-05 NOTE — PROGRESS NOTE ADULT - SUBJECTIVE AND OBJECTIVE BOX
PROGRESS NOTE:     Patient is a 89y old  Female who presents with a chief complaint of CVA (2025 13:48)          SUBJECTIVE & OBJECTIVE:   Pt seen and examined at bedside in AM.   no overnight events.       REVIEW OF SYSTEMS: remaining ROS negative     PHYSICAL EXAM:  VITALS:  Vital Signs Last 24 Hrs  T(C): 36.6 (2025 08:29), Max: 36.7 (2025 20:17)  T(F): 97.8 (2025 08:29), Max: 98 (2025 20:17)  HR: 108 (2025 08:44) (86 - 108)  BP: 144/69 (2025 08:29) (110/61 - 144/69)  BP(mean): --  RR: 16 (2025 08:29) (16 - 17)  SpO2: 97% (2025 08:44) (95% - 97%)    Parameters below as of 2025 08:44  Patient On (Oxygen Delivery Method): nasal cannula        GENERAL: NAD,  no increased WOB  HEAD:  Atraumatic, Normocephalic  EYES: EOMI, conjunctiva and sclera clear  ENMT: Moist mucous membranes  NECK: Supple, No JVD  NERVOUS SYSTEM:  Alert   CHEST/LUNG: Clear to auscultation bilaterally; No rales, rhonchi, wheezing  HEART: Regular rate and rhythm  ABDOMEN: Soft, Nontender, Nondistended; Bowel sounds present  EXTREMITIES:  No clubbing, cyanosis, calf tenderness or edema b/l    MEDICATIONS  (STANDING):  albuterol    0.083% 2.5 milliGRAM(s) Nebulizer every 6 hours  ammonium lactate 12% Lotion 1 Application(s) Topical two times a day  apixaban 2.5 milliGRAM(s) Oral every 12 hours  atenolol  Tablet 25 milliGRAM(s) Oral daily  atorvastatin 40 milliGRAM(s) Oral at bedtime  buDESOnide    Inhalation Suspension 0.5 milliGRAM(s) Inhalation every 12 hours  escitalopram 20 milliGRAM(s) Oral daily  melatonin 3 milliGRAM(s) Oral at bedtime  nystatin Powder 1 Application(s) Topical two times a day  pantoprazole    Tablet 40 milliGRAM(s) Oral before breakfast  senna 2 Tablet(s) Oral at bedtime    MEDICATIONS  (PRN):  acetaminophen     Tablet .. 650 milliGRAM(s) Oral every 6 hours PRN Mild Pain (1 - 3), Moderate Pain (4 - 6), Severe Pain (7 - 10)  albuterol    90 MICROgram(s) HFA Inhaler 2 Puff(s) Inhalation every 6 hours PRN Shortness of Breath and/or Wheezing  polyethylene glycol 3350 17 Gram(s) Oral daily PRN Constipation            Allergies    Cardizem (Rash)  [place] (Unknown)    Intolerances                        12.2   8.42  )-----------( 251      ( 2025 10:10 )             36.6     02-04    134[L]  |  96  |  16  ----------------------------<  132[H]  3.7   |  28  |  0.61    Ca    8.7      2025 10:10    TPro  7.6  /  Alb  2.3[L]  /  TBili  1.1  /  DBili  x   /  AST  22  /  ALT  20  /  AlkPhos  97  02-04    LIVER FUNCTIONS - ( 2025 10:10 )  Alb: 2.3 g/dL / Pro: 7.6 g/dL / ALK PHOS: 97 U/L / ALT: 20 U/L / AST: 22 U/L / GGT: x             Urinalysis Basic - ( 2025 15:35 )    Color: Yellow / Appearance: Clear / S.010 / pH: x  Gluc: x / Ketone: Negative mg/dL  / Bili: Negative / Urobili: 0.2 mg/dL   Blood: x / Protein: Negative mg/dL / Nitrite: Negative   Leuk Esterase: Negative / RBC: x / WBC x   Sq Epi: x / Non Sq Epi: x / Bacteria: x                LABS:                                CAPILLARY BLOOD GLUCOSE                    RECENT CULTURES:          RADIOLOGY & ADDITIONAL TESTS:

## 2025-02-05 NOTE — CHART NOTE - NSCHARTNOTEFT_GEN_A_CORE
Notified by RN that patient has some wheezing after taking off NC. Patient while on room air sats 88-90%. Patient was placed back on 2L NC with sats back to 93%. Ordered stat order of albuterol. Per RN patient denies any SOB, chest pain. Will reassess in an hour.
Nutrition Follow Up Note  Hospital Course   (Per Electronic Medical Record)    Source:  Patient [X]  Medical Record [X]      Diet:   Minced & Moist (IDDSI Level 5/Mechanical Soft/Dysphagia 2) Diet w/ Thin Liquids (IDDSI Level 0)  Tolerates Diet Consistency Well  No Chewing/Swallowing Difficulties  No Recent Nausea, Vomiting, Diarrhea or Constipation (as Per Patient)  Consumes 50-75% of Meals (as Per Documentation) - States Fair-Good Intake (Per Patient)   on Ensure Clear 8oz PO BID (Provides 480kcal & 16grams of Protein) - Order Pending Verification   Education Provided on Need for Supplementation     Enteral/Parenteral Nutrition: Not Applicable    Current Weight: 126lb on 1/27  Obtain New Weight  Obtain Weights Weekly     Pertinent Medications: MEDICATIONS  (STANDING):  albuterol    0.083% 2.5 milliGRAM(s) Nebulizer every 6 hours  ammonium lactate 12% Lotion 1 Application(s) Topical two times a day  apixaban 2.5 milliGRAM(s) Oral every 12 hours  atenolol  Tablet 50 milliGRAM(s) Oral daily  atorvastatin 40 milliGRAM(s) Oral at bedtime  buDESOnide    Inhalation Suspension 0.5 milliGRAM(s) Inhalation every 12 hours  escitalopram 20 milliGRAM(s) Oral daily  guaiFENesin  milliGRAM(s) Oral every 12 hours  melatonin 3 milliGRAM(s) Oral at bedtime  nystatin Powder 1 Application(s) Topical two times a day  pantoprazole    Tablet 40 milliGRAM(s) Oral before breakfast  senna 2 Tablet(s) Oral at bedtime    MEDICATIONS  (PRN):  acetaminophen     Tablet .. 650 milliGRAM(s) Oral every 6 hours PRN Mild Pain (1 - 3), Moderate Pain (4 - 6), Severe Pain (7 - 10)  albuterol    90 MICROgram(s) HFA Inhaler 2 Puff(s) Inhalation every 6 hours PRN Shortness of Breath and/or Wheezing  polyethylene glycol 3350 17 Gram(s) Oral daily PRN Constipation    Pertinent Labs:  01-30 Na134 mmol/L[L] Glu 101 mg/dL[H] K+ 4.0 mmol/L Cr  0.50 mg/dL BUN 12 mg/dL 01-30 Phos 3.8 mg/dL 01-30 Alb 2.3 g/dL[L] 01-17 Chol 57 mg/dL LDL --    HDL 24 mg/dL[L] Trig 58 mg/dL    Skin: Stage 1 Pressure Ulcer on Sacrum    Edema: None Noted (as Per Documentation)     Last Bowel Movement: on 1/28    Estimated Needs:   [X] No Change Since Previous Assessment    Previous Nutrition Diagnosis:   Severe Malnutrition     Nutrition Diagnosis is [X] Ongoing - Supplement Order Pending Verification     New Nutrition Diagnosis: [X] Not Applicable    Interventions:   1. Education Provided on Need for Supplementation   2. Recommend Continue Nutrition Plan of Care     Monitoring & Evaluation:   [X] Weights   [X] PO Intake   [X] Skin Integrity   [X] Follow Up (Per Protocol)  [X] Tolerance to Diet Prescription   [X] Other: Labs     Registered Dietitian/Nutritionist Remains Available.  Mack Lainez, ROLFN, CDN    Phone# [phone]
Interim Nutrition Note   Met w/ Patient  States Recently Had Significant Weight Decrease Recently.  States Weight Trending Downward Secondary to Poor PO Intake.  States Previously Trailed Ensure Plus High Protein - Declines Nutrition Supplementation   Recommend Change Supplement to Ensure Clear 8oz PO BID (Provides 480kcal & 16grams of Protein)  States Will [place].  Previously Took Vitamin B12 .  Also States Allergic to [place]   Will Continue to Monitor & Follow up as Needed.    Mack Lainez, ROLFN
Nutrition Follow Up Note  Hospital Course   (Per Electronic Medical Record)    Source:  Patient [X]  Medical Record [X]      Diet:   Minced & Moist (IDDSI Level 5/Mechanical Soft/Dysphagia 2) Diet w/ Thin Liquids (IDDSI Level 0)  Some Chewing/Swallowing Difficulties @Breakfast - Diet Consistency Downgraded to Puree (IDDSI Level 4/Dysphagia 1) Diet   Discussed w/ Speech Therapy   No Recent Nausea, Vomiting, Diarrhea or Constipation (as Per Nursing Staff)  Consumes % of Meals (as Per Documentation) - Good PO Intake/Appetite (Per Nursing Staff)  on Ensure Clear 8oz PO BID (Provides 480kcal & 16grams of Protein)   Patient Takes Nutrition Supplement Well - Per Nursing Staff   Obtained Food Preferences from Nursing Staff     Enteral/Parenteral Nutrition: Not Applicable    Current Weight: 111.5lb on 1/2  Obtain New Weight  Obtain Weights Weekly     Pertinent Medications: MEDICATIONS  (STANDING):  albuterol    0.083% 2.5 milliGRAM(s) Nebulizer every 6 hours  ammonium lactate 12% Lotion 1 Application(s) Topical two times a day  apixaban 2.5 milliGRAM(s) Oral every 12 hours  atenolol  Tablet 25 milliGRAM(s) Oral daily  atorvastatin 40 milliGRAM(s) Oral at bedtime  buDESOnide    Inhalation Suspension 0.5 milliGRAM(s) Inhalation every 12 hours  escitalopram 20 milliGRAM(s) Oral daily  melatonin 3 milliGRAM(s) Oral at bedtime  nystatin Powder 1 Application(s) Topical two times a day  pantoprazole    Tablet 40 milliGRAM(s) Oral before breakfast  senna 2 Tablet(s) Oral at bedtime    MEDICATIONS  (PRN):  acetaminophen     Tablet .. 650 milliGRAM(s) Oral every 6 hours PRN Mild Pain (1 - 3), Moderate Pain (4 - 6), Severe Pain (7 - 10)  albuterol    90 MICROgram(s) HFA Inhaler 2 Puff(s) Inhalation every 6 hours PRN Shortness of Breath and/or Wheezing  polyethylene glycol 3350 17 Gram(s) Oral daily PRN Constipation    Pertinent Labs:  02-04 Na134 mmol/L[L] Glu 132 mg/dL[H] K+ 3.7 mmol/L Cr  0.61 mg/dL BUN 16 mg/dL 01-30 Phos 3.8 mg/dL 02-04 Alb 2.3 g/dL[L] 01-17 Chol 57 mg/dL LDL --    HDL 24 mg/dL[L] Trig 58 mg/dL    Skin: Stage 1 Pressure Ulcer on Sacrum    Edema: None Noted (as Per Documentation)     Last Bowel Movement: on 2/4    Estimated Needs:   [X] No Change Since Previous Assessment    Previous Nutrition Diagnosis:   Severe Malnutrition     Nutrition Diagnosis is [X] Ongoing - Continues on Nutrition Supplement & Patient Takes Nutrition Supplement     New Nutrition Diagnosis: [X] Not Applicable    Interventions:   1. Diet Consistency Downgraded to Puree (IDDSI Level 4/Dysphagia 1) Diet    2. Recommend Continue Nutrition Plan of Care     Monitoring & Evaluation:   [X] Weights   [X] PO Intake   [X] Skin Integrity   [X] Follow Up (Per Protocol)  [X] Tolerance to Diet Prescription   [X] Other: Labs     Registered Dietitian/Nutritionist Remains Available.  Mack Lainez RDN, CDN    Phone# [phone]

## 2025-02-06 ENCOUNTER — TRANSCRIPTION ENCOUNTER (OUTPATIENT)
Age: 89
End: 2025-02-06

## 2025-02-06 VITALS
RESPIRATION RATE: 16 BRPM | DIASTOLIC BLOOD PRESSURE: 76 MMHG | TEMPERATURE: 98 F | OXYGEN SATURATION: 96 % | SYSTOLIC BLOOD PRESSURE: 115 MMHG | HEART RATE: 91 BPM

## 2025-02-06 LAB
ALBUMIN SERPL ELPH-MCNC: 2.3 G/DL — LOW (ref 3.3–5)
ALP SERPL-CCNC: 94 U/L — SIGNIFICANT CHANGE UP (ref 40–120)
ALT FLD-CCNC: 15 U/L — SIGNIFICANT CHANGE UP (ref 10–45)
ANION GAP SERPL CALC-SCNC: 7 MMOL/L — SIGNIFICANT CHANGE UP (ref 5–17)
AST SERPL-CCNC: 22 U/L — SIGNIFICANT CHANGE UP (ref 10–40)
BASOPHILS # BLD AUTO: 0.05 K/UL — SIGNIFICANT CHANGE UP (ref 0–0.2)
BASOPHILS NFR BLD AUTO: 0.7 % — SIGNIFICANT CHANGE UP (ref 0–2)
BILIRUB SERPL-MCNC: 1.1 MG/DL — SIGNIFICANT CHANGE UP (ref 0.2–1.2)
BUN SERPL-MCNC: 11 MG/DL — SIGNIFICANT CHANGE UP (ref 7–23)
CALCIUM SERPL-MCNC: 8.7 MG/DL — SIGNIFICANT CHANGE UP (ref 8.4–10.5)
CHLORIDE SERPL-SCNC: 101 MMOL/L — SIGNIFICANT CHANGE UP (ref 96–108)
CO2 SERPL-SCNC: 28 MMOL/L — SIGNIFICANT CHANGE UP (ref 22–31)
CREAT SERPL-MCNC: 0.62 MG/DL — SIGNIFICANT CHANGE UP (ref 0.5–1.3)
EGFR: 83 ML/MIN/1.73M2 — SIGNIFICANT CHANGE UP
EOSINOPHIL # BLD AUTO: 0.22 K/UL — SIGNIFICANT CHANGE UP (ref 0–0.5)
EOSINOPHIL NFR BLD AUTO: 3 % — SIGNIFICANT CHANGE UP (ref 0–6)
GLUCOSE SERPL-MCNC: 96 MG/DL — SIGNIFICANT CHANGE UP (ref 70–99)
HCT VFR BLD CALC: 35.2 % — SIGNIFICANT CHANGE UP (ref 34.5–45)
HGB BLD-MCNC: 11.5 G/DL — SIGNIFICANT CHANGE UP (ref 11.5–15.5)
IMM GRANULOCYTES NFR BLD AUTO: 0.4 % — SIGNIFICANT CHANGE UP (ref 0–0.9)
LYMPHOCYTES # BLD AUTO: 0.59 K/UL — LOW (ref 1–3.3)
LYMPHOCYTES # BLD AUTO: 7.9 % — LOW (ref 13–44)
MCHC RBC-ENTMCNC: 31.9 PG — SIGNIFICANT CHANGE UP (ref 27–34)
MCHC RBC-ENTMCNC: 32.7 G/DL — SIGNIFICANT CHANGE UP (ref 32–36)
MCV RBC AUTO: 97.5 FL — SIGNIFICANT CHANGE UP (ref 80–100)
MONOCYTES # BLD AUTO: 0.67 K/UL — SIGNIFICANT CHANGE UP (ref 0–0.9)
MONOCYTES NFR BLD AUTO: 9 % — SIGNIFICANT CHANGE UP (ref 2–14)
NEUTROPHILS # BLD AUTO: 5.89 K/UL — SIGNIFICANT CHANGE UP (ref 1.8–7.4)
NEUTROPHILS NFR BLD AUTO: 79 % — HIGH (ref 43–77)
NRBC # BLD: 0 /100 WBCS — SIGNIFICANT CHANGE UP (ref 0–0)
NRBC BLD-RTO: 0 /100 WBCS — SIGNIFICANT CHANGE UP (ref 0–0)
PLATELET # BLD AUTO: 243 K/UL — SIGNIFICANT CHANGE UP (ref 150–400)
POTASSIUM SERPL-MCNC: 4 MMOL/L — SIGNIFICANT CHANGE UP (ref 3.5–5.3)
POTASSIUM SERPL-SCNC: 4 MMOL/L — SIGNIFICANT CHANGE UP (ref 3.5–5.3)
PROT SERPL-MCNC: 7.4 G/DL — SIGNIFICANT CHANGE UP (ref 6–8.3)
RBC # BLD: 3.61 M/UL — LOW (ref 3.8–5.2)
RBC # FLD: 14 % — SIGNIFICANT CHANGE UP (ref 10.3–14.5)
SODIUM SERPL-SCNC: 136 MMOL/L — SIGNIFICANT CHANGE UP (ref 135–145)
WBC # BLD: 7.45 K/UL — SIGNIFICANT CHANGE UP (ref 3.8–10.5)
WBC # FLD AUTO: 7.45 K/UL — SIGNIFICANT CHANGE UP (ref 3.8–10.5)

## 2025-02-06 PROCEDURE — 99233 SBSQ HOSP IP/OBS HIGH 50: CPT

## 2025-02-06 RX ORDER — ACETAMINOPHEN 160 MG/5ML
2 SUSPENSION ORAL
Qty: 0 | Refills: 0 | DISCHARGE
Start: 2025-02-06

## 2025-02-06 RX ORDER — ALBUTEROL 90 MCG
2 AEROSOL REFILL (GRAM) INHALATION
Refills: 0 | DISCHARGE

## 2025-02-06 RX ORDER — POLYETHYLENE GLYCOL 3350 17 G/17G
17 POWDER, FOR SOLUTION ORAL
Qty: 0 | Refills: 0 | DISCHARGE
Start: 2025-02-06

## 2025-02-06 RX ORDER — ALBUTEROL 90 MCG
2 AEROSOL REFILL (GRAM) INHALATION
Qty: 0 | Refills: 0 | DISCHARGE
Start: 2025-02-06

## 2025-02-06 RX ORDER — PANTOPRAZOLE 20 MG/1
1 TABLET, DELAYED RELEASE ORAL
Qty: 0 | Refills: 0 | DISCHARGE
Start: 2025-02-06

## 2025-02-06 RX ORDER — ACETAMINOPHEN, DIPHENHYDRAMINE HCL, PHENYLEPHRINE HCL 325; 25; 5 MG/1; MG/1; MG/1
1 TABLET ORAL
Qty: 0 | Refills: 0 | DISCHARGE
Start: 2025-02-06

## 2025-02-06 RX ORDER — APIXABAN 5 MG/1
1 TABLET, FILM COATED ORAL
Qty: 0 | Refills: 0 | DISCHARGE
Start: 2025-02-06

## 2025-02-06 RX ORDER — BUDESONIDE 9 MG/1
2 TABLET, EXTENDED RELEASE ORAL
Qty: 60 | Refills: 0
Start: 2025-02-06 | End: 2025-03-07

## 2025-02-06 RX ORDER — SENNOSIDES 8.6 MG
2 TABLET ORAL
Qty: 0 | Refills: 0 | DISCHARGE
Start: 2025-02-06

## 2025-02-06 RX ORDER — ATENOLOL 50 MG
1 TABLET ORAL
Qty: 0 | Refills: 0 | DISCHARGE
Start: 2025-02-06

## 2025-02-06 RX ORDER — ATORVASTATIN CALCIUM 80 MG/1
1 TABLET, FILM COATED ORAL
Qty: 0 | Refills: 0 | DISCHARGE
Start: 2025-02-06

## 2025-02-06 RX ORDER — ESCITALOPRAM 10 MG/1
1 TABLET, FILM COATED ORAL
Refills: 0 | DISCHARGE

## 2025-02-06 RX ORDER — ESCITALOPRAM 10 MG/1
1 TABLET, FILM COATED ORAL
Qty: 0 | Refills: 0 | DISCHARGE
Start: 2025-02-06

## 2025-02-06 RX ADMIN — Medication 20 MILLIGRAM(S): at 05:25

## 2025-02-06 RX ADMIN — ESCITALOPRAM 20 MILLIGRAM(S): 10 TABLET, FILM COATED ORAL at 11:12

## 2025-02-06 RX ADMIN — PANTOPRAZOLE 40 MILLIGRAM(S): 20 TABLET, DELAYED RELEASE ORAL at 05:21

## 2025-02-06 RX ADMIN — Medication 2.5 MILLIGRAM(S): at 14:53

## 2025-02-06 RX ADMIN — Medication 1 APPLICATION(S): at 05:25

## 2025-02-06 RX ADMIN — APIXABAN 2.5 MILLIGRAM(S): 5 TABLET, FILM COATED ORAL at 05:20

## 2025-02-06 RX ADMIN — NYSTATIN 1 APPLICATION(S): 100000 POWDER TOPICAL at 05:25

## 2025-02-06 RX ADMIN — Medication 25 MILLIGRAM(S): at 05:21

## 2025-02-06 NOTE — DISCHARGE NOTE PROVIDER - PROVIDER TOKENS
PROVIDER:[TOKEN:[[id number]]],PROVIDER:[TOKEN:[[id number]]],PROVIDER:[TOKEN:[[id number]]]

## 2025-02-06 NOTE — DISCHARGE NOTE NURSING/CASE MANAGEMENT/SOCIAL WORK - PATIENT PORTAL LINK FT
You can access the FollowMyHealth Patient Portal offered by [facility] by registering at the following website: [email or web address]. By joining [facility]’s FollowMyHealth portal, you will also be able to view your health information using other applications (apps) compatible with our system.

## 2025-02-06 NOTE — PROGRESS NOTE ADULT - SUBJECTIVE AND OBJECTIVE BOX
HPI:  Patient is a 89 year old female with a PMH of HLD, paroxysmal Afib (not on AC at home, only on BB), SVT, rheumatoid arthritis, and Mycobacterium avium complex admitted to [facility] for acute encephalopathy and subacute CVA on 1/16/25. She had been found on the floor with AMS by her niece, who called EMS.   On arrival to ED,  patient had EKG which showed sinus tachycardia with RBB. Initial non con CTH with acute Vs subacute infarcts in Right BG and age indeterminate infarct in Left occipital lobe. Pt admitted for further work up and management of encephalopathy. Neuro consulted for stroke W/U. She was outside the window for TNK or antithrombotic on arrival to ED.  Patient was treated with aspirin suppository in the ED and admitted to tele.  Patient was monitored on tele monitor and no acute arrythmia were noted.  MRB w/ acute to subacute infarcts, CTA H/N w/no LVO or HGS and TTE w/ no evident potential embolic source    Of note, patient also found to meet SIRS sepsis criteria and completed course of antibiotics. Work-up remained negative for acute infection.    Patient was seen by PT with recommendations for acute IPR. Patient was deemed medially stable on 1/27/25 and transferred to [facility] for acute IPR.       NAD overnight, drowsy/lethargic at times. No new weakness.   Workup neg r/o sepsis. CTH f/up neg new CVA.   Wheezing less- pulmonary in, respi tx continues. NC in place 1-2 L. Lasix 20mg x3 days now-per pulm. Cleared for dc to [facility] today-per hospitalist clearance.  No HA, no chest pain, no palpitations.  On Eliquis-no overt bleeding.  Melatonin at bedtime  s/p MBS-downgraded to puree  dc [facility]      MEDICATIONS  (STANDING):  albuterol    0.083% 2.5 milliGRAM(s) Nebulizer every 6 hours  ammonium lactate 12% Lotion 1 Application(s) Topical two times a day  apixaban 2.5 milliGRAM(s) Oral every 12 hours  atenolol  Tablet 25 milliGRAM(s) Oral daily  atorvastatin 40 milliGRAM(s) Oral at bedtime  buDESOnide    Inhalation Suspension 0.5 milliGRAM(s) Inhalation every 12 hours  escitalopram 20 milliGRAM(s) Oral daily  furosemide    Tablet 20 milliGRAM(s) Oral daily  melatonin 3 milliGRAM(s) Oral at bedtime  nystatin Powder 1 Application(s) Topical two times a day  pantoprazole    Tablet 40 milliGRAM(s) Oral before breakfast  senna 2 Tablet(s) Oral at bedtime    MEDICATIONS  (PRN):  acetaminophen     Tablet .. 650 milliGRAM(s) Oral every 6 hours PRN Mild Pain (1 - 3), Moderate Pain (4 - 6), Severe Pain (7 - 10)  albuterol    90 MICROgram(s) HFA Inhaler 2 Puff(s) Inhalation every 6 hours PRN Shortness of Breath and/or Wheezing  polyethylene glycol 3350 17 Gram(s) Oral daily PRN Constipation                            11.5   7.45  )-----------( 243      ( 06 Feb 2025 06:10 )             35.2     02-06    136  |  101  |  11  ----------------------------<  96  4.0   |  28  |  0.62    Ca    8.7      06 Feb 2025 06:10    TPro  7.4  /  Alb  2.3[L]  /  TBili  1.1  /  DBili  x   /  AST  22  /  ALT  15  /  AlkPhos  94  02-06    Urinalysis Basic - ( 06 Feb 2025 06:10 )    Color: x / Appearance: x / SG: x / pH: x  Gluc: 96 mg/dL / Ketone: x  / Bili: x / Urobili: x   Blood: x / Protein: x / Nitrite: x   Leuk Esterase: x / RBC: x / WBC x   Sq Epi: x / Non Sq Epi: x / Bacteria: x      Vital Signs Last 24 Hrs  T(C): 36.6 (06 Feb 2025 08:13), Max: 36.7 (05 Feb 2025 19:50)  T(F): 97.8 (06 Feb 2025 08:13), Max: 98 (05 Feb 2025 19:50)  HR: 84 (06 Feb 2025 08:32) (84 - 97)  BP: 108/63 (06 Feb 2025 08:13) (97/63 - 111/64)  BP(mean): --  RR: 16 (06 Feb 2025 08:13) (16 - 16)  SpO2: 94% (06 Feb 2025 08:32) (94% - 96%)    Parameters below as of 06 Feb 2025 08:32  Patient On (Oxygen Delivery Method): nasal cannula      Gen - frail appearing, drowsy  HEENT - EOMI, PERRLA, Normal Conjunctivae  Neck - Supple  Pulm - crackles, diminished   Cardiovascular - irregular  Abdomen - Soft, NT/ND, +BS  Extremities -  BLE trace edema  Neuro-     Cognitive - awake oriented to person, place and year. Follows commands.     Communication - Fluent, +Hypophonic     Cranial Nerves - EOMI, no nystagmus, SAL, equal pupils, face symmetrical, no sensation loss.      Motor -                     LEFT    UE - ShAB 5/5, EF 5/5, EE 3/5,  4/5                    RIGHT UE - ShAB 5/5, EF 5/5, EE 3/5,   4/5                    LEFT    LE - HF 5/5, KE 5/5, DF 5/5, PF 5/5                    RIGHT LE - HF 5/5, KE 5/5, DF 5/5, PF 5/5        Sensory - Intact  to LT  Psychiatric - Mood stable, Affect WNL  Skin:  Generalized ecchymosis/scabbing, facial blanchable redness, b/l elbows blanchable redness, R hand middle finger abrasion healed, moisture associated dermatitis groin, L hip healed abrasion, stage 1 sacral pressure injury      Completed comprehensive acute rehab program consisting of 3hrs/day of OT/PT and SLP.

## 2025-02-06 NOTE — PROGRESS NOTE ADULT - PROVIDER SPECIALTY LIST ADULT
Hospitalist
Physiatry
Pulmonology
Pulmonology
Hospitalist
Physiatry
Hospitalist
Hospitalist
Physiatry
Pulmonology
Pulmonology
Hospitalist
Hospitalist
Physiatry
Physiatry
Pulmonology
Hospitalist
Physiatry
Hospitalist

## 2025-02-06 NOTE — PROGRESS NOTE ADULT - ASSESSMENT
89 year-old female with a PMH of HLD, paroxysmal Afib (not on AC at home, only on BB), SVT, rheumatoid arthritis, and Mycobacterium avium complex   with Gait Instability, ADL impairments and Functional impairments.    labs 2/6 reviewed    #CVA  -Repeat CT H w/o acute changes  -Continue statin, Eliquis   -PT/OT/SLP  -Follow up with PCP and Neurologist in 1 week from discharge from rehab    #Pre-diabetes   -diet control  -Follow up with PCP after dc from rehab    # Atrial fibrillation   -Eliquis   -atenolol   -Follow up with cardiologist 1 week after dc from rehab    #met SIRS criteria at referring hospital (WBC 16 with tachycardia)-resolved   -S/p Rocephin IV  -infectious w/u did not reveal acute infection     #history of Mycobacterium avium complex  -sats noted to be 85 to 86% on RA, improved to 92% with 2L NC.   -CTA chest 1/24 - Bilateral pleural effusions and lower lobe passive atelectasis. Bilateral groundglass opacities within the upper lobes, lingula, and lower lobes and centrilobular nodules are of uncertain etilogy.     The right lower lobe ill-defined 1.6 x 0.8 cm opacity.  - Repeat CT chest without contrast is recommended in 8- weeks to evaluate for resolution/change.  - given elevated BNP,continued lasix 20mg po daily for additional 3 days. CXR from 2/4 with no pleural effusions and evolving RUL opacity; aspiration vs. GGO. Pulm reccs appreciated.  - change Asmanex to budesonide nebulizer and albuterol   - prn albuterol   - supplement O2 PRN   - seen by pulmo, recommendations reviewed.    #RA  -on Xeljanz at home, not on formulary at [facility], family will need to bring home supply     # depression- Lexapro    #DVT  - Eliquis

## 2025-02-06 NOTE — PROGRESS NOTE ADULT - NUTRITIONAL ASSESSMENT
This patient has been assessed with a concern for Malnutrition and has been determined to have a diagnosis/diagnoses of Severe protein-calorie malnutrition.    This patient is being managed with:   Diet Minced and Moist-  Supplement Feeding Modality:  Oral  Ensure Clear Cans or Servings Per Day:  1       Frequency:  Two Times a day  Entered: Jan 30 2025  7:17AM    The following pending diet order is being considered for treatment of Severe protein-calorie malnutrition:  Diet Soft and Bite Sized-  Supplement Feeding Modality:  Oral  Ensure Clear Cans or Servings Per Day:  1       Frequency:  Two Times a day  Entered: Jan 28 2025  1:07PM    Diet Soft and Bite Sized-  Supplement Feeding Modality:  Oral  Ensure Plus High Protein Cans or Servings Per Day:  1       Frequency:  Two Times a day  Entered: Jan 28 2025 12:25PM  
This patient has been assessed with a concern for Malnutrition and has been determined to have a diagnosis/diagnoses of Severe protein-calorie malnutrition.    This patient is being managed with:   Diet Soft and Bite Sized-  Entered: Jan 28 2025 11:08AM    The following pending diet order is being considered for treatment of Severe protein-calorie malnutrition:  Diet Soft and Bite Sized-  Supplement Feeding Modality:  Oral  Ensure Clear Cans or Servings Per Day:  1       Frequency:  Two Times a day  Entered: Jan 28 2025  1:07PM    Diet Soft and Bite Sized-  Supplement Feeding Modality:  Oral  Ensure Plus High Protein Cans or Servings Per Day:  1       Frequency:  Two Times a day  Entered: Jan 28 2025 12:25PM  
This patient has been assessed with a concern for Malnutrition and has been determined to have a diagnosis/diagnoses of Severe protein-calorie malnutrition.    This patient is being managed with:   Diet Minced and Moist-  Entered: Jan 29 2025 12:41PM    The following pending diet order is being considered for treatment of Severe protein-calorie malnutrition:  Diet Minced and Moist-  Supplement Feeding Modality:  Oral  Ensure Clear Cans or Servings Per Day:  1       Frequency:  Two Times a day  Entered: Jan 30 2025  7:17AM    Diet Soft and Bite Sized-  Supplement Feeding Modality:  Oral  Ensure Clear Cans or Servings Per Day:  1       Frequency:  Two Times a day  Entered: Jan 28 2025  1:07PM    Diet Soft and Bite Sized-  Supplement Feeding Modality:  Oral  Ensure Plus High Protein Cans or Servings Per Day:  1       Frequency:  Two Times a day  Entered: Jan 28 2025 12:25PM  
This patient has been assessed with a concern for Malnutrition and has been determined to have a diagnosis/diagnoses of Severe protein-calorie malnutrition.    This patient is being managed with:   Diet Pureed-  Supplement Feeding Modality:  Oral  Ensure Clear Cans or Servings Per Day:  1       Frequency:  Two Times a day  Entered: Feb 5 2025  8:58AM    The following pending diet order is being considered for treatment of Severe protein-calorie malnutrition:  Diet Soft and Bite Sized-  Supplement Feeding Modality:  Oral  Ensure Clear Cans or Servings Per Day:  1       Frequency:  Two Times a day  Entered: Jan 28 2025  1:07PM    Diet Soft and Bite Sized-  Supplement Feeding Modality:  Oral  Ensure Plus High Protein Cans or Servings Per Day:  1       Frequency:  Two Times a day  Entered: Jan 28 2025 12:25PM  
This patient has been assessed with a concern for Malnutrition and has been determined to have a diagnosis/diagnoses of Severe protein-calorie malnutrition.    This patient is being managed with:   Diet Pureed-  Supplement Feeding Modality:  Oral  Ensure Clear Cans or Servings Per Day:  1       Frequency:  Two Times a day  Entered: Feb 5 2025  8:58AM    The following pending diet order is being considered for treatment of Severe protein-calorie malnutrition:  Diet Soft and Bite Sized-  Supplement Feeding Modality:  Oral  Ensure Clear Cans or Servings Per Day:  1       Frequency:  Two Times a day  Entered: Jan 28 2025  1:07PM    Diet Soft and Bite Sized-  Supplement Feeding Modality:  Oral  Ensure Plus High Protein Cans or Servings Per Day:  1       Frequency:  Two Times a day  Entered: Jan 28 2025 12:25PM  
This patient has been assessed with a concern for Malnutrition and has been determined to have a diagnosis/diagnoses of Severe protein-calorie malnutrition.    This patient is being managed with:   Diet Minced and Moist-  Supplement Feeding Modality:  Oral  Ensure Clear Cans or Servings Per Day:  1       Frequency:  Two Times a day  Entered: Jan 30 2025  7:17AM    The following pending diet order is being considered for treatment of Severe protein-calorie malnutrition:  Diet Soft and Bite Sized-  Supplement Feeding Modality:  Oral  Ensure Clear Cans or Servings Per Day:  1       Frequency:  Two Times a day  Entered: Jan 28 2025  1:07PM    Diet Soft and Bite Sized-  Supplement Feeding Modality:  Oral  Ensure Plus High Protein Cans or Servings Per Day:  1       Frequency:  Two Times a day  Entered: Jan 28 2025 12:25PM  
This patient has been assessed with a concern for Malnutrition and has been determined to have a diagnosis/diagnoses of Severe protein-calorie malnutrition.    This patient is being managed with:   Diet Minced and Moist-  Entered: Jan 29 2025 12:41PM    The following pending diet order is being considered for treatment of Severe protein-calorie malnutrition:  Diet Minced and Moist-  Supplement Feeding Modality:  Oral  Ensure Clear Cans or Servings Per Day:  1       Frequency:  Two Times a day  Entered: Jan 30 2025  7:17AM    Diet Soft and Bite Sized-  Supplement Feeding Modality:  Oral  Ensure Clear Cans or Servings Per Day:  1       Frequency:  Two Times a day  Entered: Jan 28 2025  1:07PM    Diet Soft and Bite Sized-  Supplement Feeding Modality:  Oral  Ensure Plus High Protein Cans or Servings Per Day:  1       Frequency:  Two Times a day  Entered: Jan 28 2025 12:25PM  
This patient has been assessed with a concern for Malnutrition and has been determined to have a diagnosis/diagnoses of Severe protein-calorie malnutrition.    This patient is being managed with:   Diet Minced and Moist-  Supplement Feeding Modality:  Oral  Ensure Clear Cans or Servings Per Day:  1       Frequency:  Two Times a day  Entered: Jan 30 2025  7:17AM    The following pending diet order is being considered for treatment of Severe protein-calorie malnutrition:  Diet Soft and Bite Sized-  Supplement Feeding Modality:  Oral  Ensure Clear Cans or Servings Per Day:  1       Frequency:  Two Times a day  Entered: Jan 28 2025  1:07PM    Diet Soft and Bite Sized-  Supplement Feeding Modality:  Oral  Ensure Plus High Protein Cans or Servings Per Day:  1       Frequency:  Two Times a day  Entered: Jan 28 2025 12:25PM  
This patient has been assessed with a concern for Malnutrition and has been determined to have a diagnosis/diagnoses of Severe protein-calorie malnutrition.    This patient is being managed with:   Diet Minced and Moist-  Entered: Jan 29 2025 12:41PM    The following pending diet order is being considered for treatment of Severe protein-calorie malnutrition:  Diet Minced and Moist-  Supplement Feeding Modality:  Oral  Ensure Clear Cans or Servings Per Day:  1       Frequency:  Two Times a day  Entered: Jan 30 2025  7:17AM    Diet Soft and Bite Sized-  Supplement Feeding Modality:  Oral  Ensure Clear Cans or Servings Per Day:  1       Frequency:  Two Times a day  Entered: Jan 28 2025  1:07PM    Diet Soft and Bite Sized-  Supplement Feeding Modality:  Oral  Ensure Plus High Protein Cans or Servings Per Day:  1       Frequency:  Two Times a day  Entered: Jan 28 2025 12:25PM  
This patient has been assessed with a concern for Malnutrition and has been determined to have a diagnosis/diagnoses of Severe protein-calorie malnutrition.    This patient is being managed with:   Diet Minced and Moist-  Entered: Jan 29 2025 12:41PM    The following pending diet order is being considered for treatment of Severe protein-calorie malnutrition:  Diet Soft and Bite Sized-  Supplement Feeding Modality:  Oral  Ensure Clear Cans or Servings Per Day:  1       Frequency:  Two Times a day  Entered: Jan 28 2025  1:07PM    Diet Soft and Bite Sized-  Supplement Feeding Modality:  Oral  Ensure Plus High Protein Cans or Servings Per Day:  1       Frequency:  Two Times a day  Entered: Jan 28 2025 12:25PM

## 2025-02-06 NOTE — DISCHARGE NOTE NURSING/CASE MANAGEMENT/SOCIAL WORK - FINANCIAL ASSISTANCE
[facility] provides services at a reduced cost to those who are determined to be eligible through [facility]’s financial assistance program. Information regarding [facility]’s financial assistance program can be found by going to [email or web address] or by calling [phone].

## 2025-02-06 NOTE — DISCHARGE NOTE PROVIDER - CARE PROVIDERS DIRECT ADDRESSES
,[email or web address]

## 2025-02-06 NOTE — PROGRESS NOTE ADULT - TIME BILLING
review of labs, imaging  review of clinical documents  face to face encounter   coordination of care with primary team  A/P and documentation of encounter
This includes reviewing results/imaging and discussions with specialists, nursing, case management/social work. Further tests, medications, and procedures have been ordered as indicated. Results and the plan of care were communicated to the patient and/or their family member. Supporting documentation was completed and added to the patient's chart.

## 2025-02-06 NOTE — PROGRESS NOTE ADULT - ASSESSMENT
89 year-old female with a PMH of HLD, paroxysmal Afib (not on AC at home, only on BB), SVT, rheumatoid arthritis, and Mycobacterium avium complex   with Gait Instability, ADL impairments and Functional impairments.      #Gait Instability, ADL impairments and Functional impairments  - Comprehensive Rehab Program of PT/OT/SLP    #CVA  -Continue statin  - CT head neg new events.   -on eliquis 2.5mg BID for Pafib- EKG-hospitalist in  - MBS completed-diet adjusted  -Follow up with your PCP and Neurologist in 1 week from discharge from rehab    #Endo: HbA1C: 5.8   -Monitor fasting glucose levels on routine labs   -Follow up with PCP after dc from rehab    # Atrial fibrillation   -Continue AC with Eliquis   - Atenolol per hospitalist  -Follow up with cardiologist    #SIRS at referring hospital  - workup sent -hospitalist to follow up     #history of Mycobacterium avium complex  -CTA chest 1/24--> Bilateral pleural effusions and lower lobe passive atelectasis. Bilateral groundglass opacities within the upper lobes, lingula, and lower lobes and centrilobular nodules are of uncertain etilogy. The right lower lobe ill-defined 1.6 x 0.8 cm opacity.  -Repeat CT chest without contrast is recommended in 8- weeks to evaluate for resolution/change.  -prn albuterol inhaler   - Supplemental O2. Monitor resp status.   - Pulmonology recs: lasix    #RA  -on Xeljanz at home, not on formulary at [facility]  - outpt f/up    #Pain control  - Tylenol PRN    #GI/Bowel Mgmt   - Continue Senna at bedtime   - Miralax prn   - Protonix for GI ppx while hospitalized    #Bladder management  -no retention reported, continent- UA neg  - toileting schedule    #BLE calf pain  - Duplex US-on Eliquis- US 1/28-NEG    #Skin:  - stage 1 pressure ulcer sacrum  - turn and position q2h   - Nystatin BID    FEN   - Diet - DASH   - Dysphagia  SLP - evaluation and treatment- [facility]- puree    Precautions / PROPHYLAXIS:   - Falls, Cardiac, Seizure   - Lungs: Aspiration, Incentive Spirometer   - Pressure injury/Skin: Turn Q2hrs while in bed, OOB to Chair, PT/OT        Follow up with these providers:   General neurology at [facility],  1-2 weeks after discharge. Please instruct the patient to call [phone]  to schedule this appointment.    PCP Dr Rhodes

## 2025-02-06 NOTE — DISCHARGE NOTE PROVIDER - HOSPITAL COURSE
89 year old female with a PMH of HLD, paroxysmal Afib (not on AC at home, only on BB), SVT, rheumatoid arthritis, and Mycobacterium avium complex admitted to [facility] for acute encephalopathy and subacute CVA on 1/16/25. She had been found on the floor with AMS by her niece, who called EMS.   On arrival to ED,  patient had EKG which showed sinus tachycardia with RBB. Initial non con CTH with acute Vs subacute infarcts in Right BG and age indeterminate infarct in Left occipital lobe. Pt admitted for further work up and management of encephalopathy. Neuro consulted for stroke W/U. She was outside the window for TNK or antithrombotic on arrival to ED.  Patient was treated with aspirin suppository in the ED and admitted to tele.  Patient was monitored on tele monitor and no acute arrythmia were noted.  MRB w/ acute to subacute infarcts, CTA H/N w/no LVO or HGS and TTE w/ no evident potential embolic source    Of note, patient also found to meet SIRS sepsis criteria and completed course of antibiotics. Work-up remained negative for acute infection.    Patient was seen by PT with recommendations for acute IPR. Patient was deemed medially stable on 1/27/25 and transferred to [facility] for acute IPR.   At [facility] rehab patient completed comprehensive PT OT SLP program and reached her rehab goals on 2/6. While at rehab patient evaluated by pulm and hospitalist. Diet downgraded, [facility] follow up for drowsiness stable. O2 NC continues. Respi treatments for cough. Lasix added by pulm. Cleared for dc on 2/6.

## 2025-02-06 NOTE — DISCHARGE NOTE PROVIDER - NSDCMRMEDTOKEN_GEN_ALL_CORE_FT
acetaminophen 325 mg oral tablet: 2 tab(s) orally every 6 hours As needed Mild Pain (1 - 3), Moderate Pain (4 - 6), Severe Pain (7 - 10)  albuterol 90 mcg/inh inhalation aerosol: 2 puff(s) inhaled every 6 hours As needed Shortness of Breath and/or Wheezing  apixaban 2.5 mg oral tablet: 1 tab(s) orally every 12 hours  atenolol 25 mg oral tablet: 1 tab(s) orally once a day  atorvastatin 40 mg oral tablet: 1 tab(s) orally once a day (at bedtime)  budesonide 0.5 mg/2 mL inhalation suspension: 2 milliliter(s) by nebulizer 2 times a day  escitalopram 20 mg oral tablet: 1 tab(s) orally once a day  furosemide 20 mg oral tablet: 1 tab(s) orally once a day  melatonin 3 mg oral tablet: 1 tab(s) orally once a day (at bedtime)  pantoprazole 40 mg oral delayed release tablet: 1 tab(s) orally once a day (before a meal)  polyethylene glycol 3350 oral powder for reconstitution: 17 gram(s) orally once a day As needed Constipation  senna leaf extract oral tablet: 2 tab(s) orally once a day (at bedtime)

## 2025-02-06 NOTE — DISCHARGE NOTE NURSING/CASE MANAGEMENT/SOCIAL WORK - NSDCPEFALRISK_GEN_ALL_CORE
For information on Fall & Injury Prevention, visit: [email or web address] OR  [email or web address] OR  https://www.cdc.gov/steadi/patient.html

## 2025-02-06 NOTE — PROGRESS NOTE ADULT - SUBJECTIVE AND OBJECTIVE BOX
no acute events overnight  still with coughing  denies SOB  anxious to be discharged      PHYSICAL EXAM:    Vital Signs Last 24 Hrs  T(F): 97.6 (06 Feb 2025 15:13), Max: 97.8 (06 Feb 2025 08:13)  HR: 91 (06 Feb 2025 15:13) (84 - 93)  BP: 115/76 (06 Feb 2025 15:13) (108/63 - 115/76)  RR: 16 (06 Feb 2025 15:13) (16 - 16)  SpO2: 96% (06 Feb 2025 15:13) (94% - 96%)  I&O's Summary      GENERAL: NAD  HEENT: NCAT  CHEST/LUNG: No increased WOB, Clear to percussion bilaterally; No rales, rhonchi, wheezing  HEART: Regular rate and rhythm; No murmurs  ABDOMEN: Soft, Nontender, Nondistended; Bowel sounds present  MUSCULOSKELETAL/EXTREMITIES:  2+ Peripheral Pulses, No LE edema  PSYCH: Appropriate affect, Alert & Oriented    LABS:                        11.5   7.45  )-----------( 243      ( 06 Feb 2025 06:10 )             35.2       02-06    136  |  101  |  11  ----------------------------<  96  4.0   |  28  |  0.62    Ca    8.7      06 Feb 2025 06:10    TPro  7.4  /  Alb  2.3  /  TBili  1.1  /  DBili  x   /  AST  22  /  ALT  15  /  AlkPhos  94  02-06                01-17 Chol 57 mg/dL LDL -- HDL 24 mg/dL Trig 58 mg/dL                  Urinalysis Basic - ( 06 Feb 2025 06:10 )    Color: x / Appearance: x / SG: x / pH: x  Gluc: 96 mg/dL / Ketone: x  / Bili: x / Urobili: x   Blood: x / Protein: x / Nitrite: x   Leuk Esterase: x / RBC: x / WBC x   Sq Epi: x / Non Sq Epi: x / Bacteria: x        COVID-19 PCR: NotDetec (01-27-25 @ 20:47)      MEDICATIONS  (STANDING):  albuterol    0.083% 2.5 milliGRAM(s) Nebulizer every 6 hours  ammonium lactate 12% Lotion 1 Application(s) Topical two times a day  apixaban 2.5 milliGRAM(s) Oral every 12 hours  atenolol  Tablet 25 milliGRAM(s) Oral daily  atorvastatin 40 milliGRAM(s) Oral at bedtime  buDESOnide    Inhalation Suspension 0.5 milliGRAM(s) Inhalation every 12 hours  escitalopram 20 milliGRAM(s) Oral daily  furosemide    Tablet 20 milliGRAM(s) Oral daily  melatonin 3 milliGRAM(s) Oral at bedtime  nystatin Powder 1 Application(s) Topical two times a day  pantoprazole    Tablet 40 milliGRAM(s) Oral before breakfast  senna 2 Tablet(s) Oral at bedtime    MEDICATIONS  (PRN):  acetaminophen     Tablet .. 650 milliGRAM(s) Oral every 6 hours PRN Mild Pain (1 - 3), Moderate Pain (4 - 6), Severe Pain (7 - 10)  albuterol    90 MICROgram(s) HFA Inhaler 2 Puff(s) Inhalation every 6 hours PRN Shortness of Breath and/or Wheezing  polyethylene glycol 3350 17 Gram(s) Oral daily PRN Constipation      Care Discussed with Consultants/Other Providers: Yes

## 2025-02-06 NOTE — DISCHARGE NOTE PROVIDER - NSDCCPCAREPLAN_GEN_ALL_CORE_FT
PRINCIPAL DISCHARGE DIAGNOSIS  Diagnosis: CVA (cerebrovascular accident)  Assessment and Plan of Treatment: On Eliquis and statin. Follow up Neurology in 1 week. Continue therapy      SECONDARY DISCHARGE DIAGNOSES  Diagnosis: Afib  Assessment and Plan of Treatment: On Eliquis and Atenolol. Follow up Cardiology. Hx SVT.    Diagnosis: Pneumonia due to virus  Assessment and Plan of Treatment: Resolved, afebrile. Follow up pulmonary Dr woo. Contiunue respi treatments. And Lasix. Trial x 3 days-reevaluate. Wean off O2 NC if able.    Diagnosis: H/O solitary pulmonary nodule  Assessment and Plan of Treatment: Follow up pulmonary    Diagnosis: Rheumatoid arthritis  Assessment and Plan of Treatment: Follow up Rheumatology, on Xeljanz prior.

## 2025-02-06 NOTE — DISCHARGE NOTE PROVIDER - CARE PROVIDER_API CALL
SELWYN VEGAS  [address] [place] [address]  Phone: ([phone]  Fax: ()-  Follow Up Time:     Jose Vasquez  Neurology  [address] [place] [address]  Phone: [phone]  Fax: [phone]  Follow Up Time:     Adolfo Bah  Cardiology  [address] [place] [address]  Phone: [phone]  Fax: [phone]  Follow Up Time:

## 2025-03-11 PROCEDURE — 83880 ASSAY OF NATRIURETIC PEPTIDE: CPT

## 2025-03-11 PROCEDURE — 92526 ORAL FUNCTION THERAPY: CPT | Mod: GN

## 2025-03-11 PROCEDURE — 86140 C-REACTIVE PROTEIN: CPT

## 2025-03-11 PROCEDURE — 85025 COMPLETE CBC W/AUTO DIFF WBC: CPT

## 2025-03-11 PROCEDURE — 80053 COMPREHEN METABOLIC PANEL: CPT

## 2025-03-11 PROCEDURE — 84145 PROCALCITONIN (PCT): CPT

## 2025-03-11 PROCEDURE — 94640 AIRWAY INHALATION TREATMENT: CPT

## 2025-03-11 PROCEDURE — 97530 THERAPEUTIC ACTIVITIES: CPT | Mod: GP

## 2025-03-11 PROCEDURE — 71045 X-RAY EXAM CHEST 1 VIEW: CPT

## 2025-03-11 PROCEDURE — 97110 THERAPEUTIC EXERCISES: CPT | Mod: GO

## 2025-03-11 PROCEDURE — 70450 CT HEAD/BRAIN W/O DYE: CPT | Mod: MC

## 2025-03-11 PROCEDURE — 74230 X-RAY XM SWLNG FUNCJ C+: CPT

## 2025-03-11 PROCEDURE — 92610 EVALUATE SWALLOWING FUNCTION: CPT | Mod: GN

## 2025-03-11 PROCEDURE — 93970 EXTREMITY STUDY: CPT

## 2025-03-11 PROCEDURE — 92507 TX SP LANG VOICE COMM INDIV: CPT | Mod: GN

## 2025-03-11 PROCEDURE — 97535 SELF CARE MNGMENT TRAINING: CPT | Mod: GO

## 2025-03-11 PROCEDURE — 81003 URINALYSIS AUTO W/O SCOPE: CPT

## 2025-03-11 PROCEDURE — 84100 ASSAY OF PHOSPHORUS: CPT

## 2025-03-11 PROCEDURE — 97161 PT EVAL LOW COMPLEX 20 MIN: CPT | Mod: GP

## 2025-03-11 PROCEDURE — 97165 OT EVAL LOW COMPLEX 30 MIN: CPT | Mod: GO

## 2025-03-11 PROCEDURE — 93005 ELECTROCARDIOGRAM TRACING: CPT

## 2025-03-11 PROCEDURE — 84484 ASSAY OF TROPONIN QUANT: CPT

## 2025-03-11 PROCEDURE — 36415 COLL VENOUS BLD VENIPUNCTURE: CPT

## 2025-03-11 PROCEDURE — 87635 SARS-COV-2 COVID-19 AMP PRB: CPT

## 2025-03-11 PROCEDURE — 97116 GAIT TRAINING THERAPY: CPT | Mod: GP

## 2025-03-11 PROCEDURE — 92523 SPEECH SOUND LANG COMPREHEN: CPT | Mod: GN

## 2025-03-11 PROCEDURE — 83735 ASSAY OF MAGNESIUM: CPT

## 2025-03-11 PROCEDURE — 84443 ASSAY THYROID STIM HORMONE: CPT

## 2025-05-19 NOTE — ED PROVIDER NOTE - QRS
Cardiac clearance for US FNA Thyroid on 5/28. Pt taking Wily, facility requesting hold for 1-3 days prior.     [facility]  Fax: [phone]  
Faxed to [phone]  
[id number]